# Patient Record
Sex: FEMALE | Race: BLACK OR AFRICAN AMERICAN | HISPANIC OR LATINO
[De-identification: names, ages, dates, MRNs, and addresses within clinical notes are randomized per-mention and may not be internally consistent; named-entity substitution may affect disease eponyms.]

---

## 2020-11-27 ENCOUNTER — APPOINTMENT (OUTPATIENT)
Dept: OPHTHALMOLOGY | Facility: CLINIC | Age: 85
End: 2020-11-27
Payer: MEDICARE

## 2020-11-27 ENCOUNTER — NON-APPOINTMENT (OUTPATIENT)
Age: 85
End: 2020-11-27

## 2020-11-27 PROCEDURE — 92133 CPTRZD OPH DX IMG PST SGM ON: CPT

## 2020-11-27 PROCEDURE — 92004 COMPRE OPH EXAM NEW PT 1/>: CPT

## 2020-11-28 PROBLEM — Z00.00 ENCOUNTER FOR PREVENTIVE HEALTH EXAMINATION: Status: ACTIVE | Noted: 2020-11-28

## 2021-01-08 ENCOUNTER — NON-APPOINTMENT (OUTPATIENT)
Age: 86
End: 2021-01-08

## 2021-01-08 ENCOUNTER — APPOINTMENT (OUTPATIENT)
Dept: OPHTHALMOLOGY | Facility: CLINIC | Age: 86
End: 2021-01-08
Payer: MEDICARE

## 2021-01-08 PROCEDURE — 92012 INTRM OPH EXAM EST PATIENT: CPT

## 2021-01-08 PROCEDURE — 99072 ADDL SUPL MATRL&STAF TM PHE: CPT

## 2021-02-04 ENCOUNTER — APPOINTMENT (OUTPATIENT)
Dept: OPHTHALMOLOGY | Facility: CLINIC | Age: 86
End: 2021-02-04
Payer: MEDICARE

## 2021-02-04 ENCOUNTER — NON-APPOINTMENT (OUTPATIENT)
Age: 86
End: 2021-02-04

## 2021-02-04 PROCEDURE — 66821 AFTER CATARACT LASER SURGERY: CPT | Mod: RT

## 2021-02-04 PROCEDURE — 99072 ADDL SUPL MATRL&STAF TM PHE: CPT

## 2021-02-10 ENCOUNTER — NON-APPOINTMENT (OUTPATIENT)
Age: 86
End: 2021-02-10

## 2021-02-10 ENCOUNTER — APPOINTMENT (OUTPATIENT)
Dept: OPHTHALMOLOGY | Facility: CLINIC | Age: 86
End: 2021-02-10
Payer: MEDICARE

## 2021-02-10 PROCEDURE — 99024 POSTOP FOLLOW-UP VISIT: CPT

## 2021-05-11 ENCOUNTER — NON-APPOINTMENT (OUTPATIENT)
Age: 86
End: 2021-05-11

## 2021-05-11 ENCOUNTER — APPOINTMENT (OUTPATIENT)
Dept: OPHTHALMOLOGY | Facility: CLINIC | Age: 86
End: 2021-05-11
Payer: MEDICARE

## 2021-05-11 PROCEDURE — 99072 ADDL SUPL MATRL&STAF TM PHE: CPT

## 2021-05-11 PROCEDURE — 92012 INTRM OPH EXAM EST PATIENT: CPT

## 2021-08-17 ENCOUNTER — NON-APPOINTMENT (OUTPATIENT)
Age: 86
End: 2021-08-17

## 2021-08-17 ENCOUNTER — APPOINTMENT (OUTPATIENT)
Dept: OPHTHALMOLOGY | Facility: CLINIC | Age: 86
End: 2021-08-17
Payer: MEDICARE

## 2021-08-17 PROCEDURE — 92012 INTRM OPH EXAM EST PATIENT: CPT

## 2021-08-17 PROCEDURE — 92133 CPTRZD OPH DX IMG PST SGM ON: CPT

## 2021-09-29 ENCOUNTER — NON-APPOINTMENT (OUTPATIENT)
Age: 86
End: 2021-09-29

## 2021-09-29 ENCOUNTER — APPOINTMENT (OUTPATIENT)
Dept: OPHTHALMOLOGY | Facility: CLINIC | Age: 86
End: 2021-09-29
Payer: MEDICARE

## 2021-09-29 PROCEDURE — 92012 INTRM OPH EXAM EST PATIENT: CPT

## 2022-01-25 ENCOUNTER — NON-APPOINTMENT (OUTPATIENT)
Age: 87
End: 2022-01-25

## 2022-01-25 ENCOUNTER — APPOINTMENT (OUTPATIENT)
Dept: OPHTHALMOLOGY | Facility: CLINIC | Age: 87
End: 2022-01-25
Payer: MEDICARE

## 2022-01-25 PROCEDURE — 92012 INTRM OPH EXAM EST PATIENT: CPT

## 2022-02-01 ENCOUNTER — APPOINTMENT (OUTPATIENT)
Dept: OPHTHALMOLOGY | Facility: CLINIC | Age: 87
End: 2022-02-01

## 2022-03-28 ENCOUNTER — EMERGENCY (EMERGENCY)
Facility: HOSPITAL | Age: 87
LOS: 1 days | Discharge: HOME | End: 2022-03-28
Attending: EMERGENCY MEDICINE
Payer: MEDICARE

## 2022-03-28 VITALS
HEIGHT: 63 IN | SYSTOLIC BLOOD PRESSURE: 183 MMHG | RESPIRATION RATE: 18 BRPM | TEMPERATURE: 98 F | WEIGHT: 149.91 LBS | OXYGEN SATURATION: 97 % | HEART RATE: 100 BPM | DIASTOLIC BLOOD PRESSURE: 100 MMHG

## 2022-03-28 VITALS
DIASTOLIC BLOOD PRESSURE: 93 MMHG | TEMPERATURE: 98 F | RESPIRATION RATE: 16 BRPM | HEART RATE: 81 BPM | OXYGEN SATURATION: 98 % | SYSTOLIC BLOOD PRESSURE: 174 MMHG

## 2022-03-28 LAB
ALBUMIN SERPL ELPH-MCNC: 4.1 G/DL — SIGNIFICANT CHANGE UP (ref 3.3–5)
ALP SERPL-CCNC: 89 U/L — SIGNIFICANT CHANGE UP (ref 40–120)
ALT FLD-CCNC: 9 U/L — LOW (ref 10–45)
ANION GAP SERPL CALC-SCNC: 14 MMOL/L — SIGNIFICANT CHANGE UP (ref 5–17)
AST SERPL-CCNC: 22 U/L — SIGNIFICANT CHANGE UP (ref 10–40)
BASOPHILS # BLD AUTO: 0.02 K/UL — SIGNIFICANT CHANGE UP (ref 0–0.2)
BASOPHILS NFR BLD AUTO: 0.4 % — SIGNIFICANT CHANGE UP (ref 0–2)
BILIRUB SERPL-MCNC: 0.3 MG/DL — SIGNIFICANT CHANGE UP (ref 0.2–1.2)
BUN SERPL-MCNC: 15 MG/DL — SIGNIFICANT CHANGE UP (ref 7–23)
CALCIUM SERPL-MCNC: 9.6 MG/DL — SIGNIFICANT CHANGE UP (ref 8.4–10.5)
CHLORIDE SERPL-SCNC: 105 MMOL/L — SIGNIFICANT CHANGE UP (ref 96–108)
CO2 SERPL-SCNC: 22 MMOL/L — SIGNIFICANT CHANGE UP (ref 22–31)
CREAT SERPL-MCNC: 0.75 MG/DL — SIGNIFICANT CHANGE UP (ref 0.5–1.3)
EGFR: 76 ML/MIN/1.73M2 — SIGNIFICANT CHANGE UP
EOSINOPHIL # BLD AUTO: 0.11 K/UL — SIGNIFICANT CHANGE UP (ref 0–0.5)
EOSINOPHIL NFR BLD AUTO: 2.3 % — SIGNIFICANT CHANGE UP (ref 0–6)
GLUCOSE SERPL-MCNC: 105 MG/DL — HIGH (ref 70–99)
HCT VFR BLD CALC: 35.3 % — SIGNIFICANT CHANGE UP (ref 34.5–45)
HGB BLD-MCNC: 11.5 G/DL — SIGNIFICANT CHANGE UP (ref 11.5–15.5)
IMM GRANULOCYTES NFR BLD AUTO: 0.2 % — SIGNIFICANT CHANGE UP (ref 0–1.5)
LYMPHOCYTES # BLD AUTO: 1.7 K/UL — SIGNIFICANT CHANGE UP (ref 1–3.3)
LYMPHOCYTES # BLD AUTO: 36.1 % — SIGNIFICANT CHANGE UP (ref 13–44)
MCHC RBC-ENTMCNC: 30 PG — SIGNIFICANT CHANGE UP (ref 27–34)
MCHC RBC-ENTMCNC: 32.6 GM/DL — SIGNIFICANT CHANGE UP (ref 32–36)
MCV RBC AUTO: 92.2 FL — SIGNIFICANT CHANGE UP (ref 80–100)
MONOCYTES # BLD AUTO: 0.61 K/UL — SIGNIFICANT CHANGE UP (ref 0–0.9)
MONOCYTES NFR BLD AUTO: 13 % — SIGNIFICANT CHANGE UP (ref 2–14)
NEUTROPHILS # BLD AUTO: 2.26 K/UL — SIGNIFICANT CHANGE UP (ref 1.8–7.4)
NEUTROPHILS NFR BLD AUTO: 48 % — SIGNIFICANT CHANGE UP (ref 43–77)
NRBC # BLD: 0 /100 WBCS — SIGNIFICANT CHANGE UP (ref 0–0)
PLATELET # BLD AUTO: 235 K/UL — SIGNIFICANT CHANGE UP (ref 150–400)
POTASSIUM SERPL-MCNC: 4 MMOL/L — SIGNIFICANT CHANGE UP (ref 3.5–5.3)
POTASSIUM SERPL-SCNC: 4 MMOL/L — SIGNIFICANT CHANGE UP (ref 3.5–5.3)
PROT SERPL-MCNC: 7.6 G/DL — SIGNIFICANT CHANGE UP (ref 6–8.3)
RBC # BLD: 3.83 M/UL — SIGNIFICANT CHANGE UP (ref 3.8–5.2)
RBC # FLD: 14.8 % — HIGH (ref 10.3–14.5)
SODIUM SERPL-SCNC: 141 MMOL/L — SIGNIFICANT CHANGE UP (ref 135–145)
TROPONIN T, HIGH SENSITIVITY RESULT: 9 NG/L — SIGNIFICANT CHANGE UP (ref 0–51)
WBC # BLD: 4.71 K/UL — SIGNIFICANT CHANGE UP (ref 3.8–10.5)
WBC # FLD AUTO: 4.71 K/UL — SIGNIFICANT CHANGE UP (ref 3.8–10.5)

## 2022-03-28 PROCEDURE — 84484 ASSAY OF TROPONIN QUANT: CPT

## 2022-03-28 PROCEDURE — 93005 ELECTROCARDIOGRAM TRACING: CPT

## 2022-03-28 PROCEDURE — 85025 COMPLETE CBC W/AUTO DIFF WBC: CPT

## 2022-03-28 PROCEDURE — 99284 EMERGENCY DEPT VISIT MOD MDM: CPT

## 2022-03-28 PROCEDURE — 80053 COMPREHEN METABOLIC PANEL: CPT

## 2022-03-28 PROCEDURE — 93010 ELECTROCARDIOGRAM REPORT: CPT

## 2022-03-28 PROCEDURE — 99285 EMERGENCY DEPT VISIT HI MDM: CPT

## 2022-03-28 NOTE — ED ADULT NURSE NOTE - NSIMPLEMENTINTERV_GEN_ALL_ED
Implemented All Universal Safety Interventions:  [place] to call system. Call bell, personal items and telephone within reach. Instruct patient to call for assistance. Room bathroom lighting operational. Non-slip footwear when patient is off stretcher. Physically safe environment: no spills, clutter or unnecessary equipment. Stretcher in lowest position, wheels locked, appropriate side rails in place.

## 2022-03-28 NOTE — ED ADULT TRIAGE NOTE - CHIEF COMPLAINT QUOTE
Pt states "generalized heat sensations" upon waking today  c/o not moving BM properly for 3 days  sent in from urgent care for abnormal EKG

## 2022-03-28 NOTE — ED PROVIDER NOTE - NS ED MD DISPO DISCHARGE
03/02/20        Mabel Perla III  [address]      Dear Suzan Best,    [facility] records indicate that you have outstanding lab work and or testing that was ordered for you and has not yet been completed:  Orders Placed This Encounter
Home

## 2022-03-28 NOTE — ED PROVIDER NOTE - PATIENT PORTAL LINK FT
You can access the FollowMyHealth Patient Portal offered by [facility] by registering at the following website: [email or web address]. By joining [employer]’s FollowMyHealth portal, you will also be able to view your health information using other applications (apps) compatible with our system.

## 2022-03-28 NOTE — ED PROVIDER NOTE - ATTENDING CONTRIBUTION TO CARE
20 min facial flushing / rush over the top half of body, better w bath. no cp or sob. notes she never had before - refered to er from urgent care. no pleuritic. no diaphoresis. no vomiting or pain. asmyptomatic here. happened in am many hrs ago.  pe above mine  ekg not specific for acs. ro acs. not cw neuro process. labs / ekg / f/u w personal medical doctor.

## 2022-03-28 NOTE — ED PROVIDER NOTE - PHYSICAL EXAMINATION
General: A&O x3 and in NAD  Heart: Normal S1/S2, normal rate, rhythm. No MRG  Lungs: CTA B/L  Abdomen: nontender throughout all quadrants, no rigidity, guarding distention.  PVS: no edema

## 2022-03-28 NOTE — ED ADULT NURSE REASSESSMENT NOTE - NS ED NURSE REASSESS COMMENT FT1
Received patient A&Ox3. vision impaired with no complaints at the moment states she wants to go home, will notify MD.

## 2022-03-28 NOTE — ED PROVIDER NOTE - NSFOLLOWUPINSTRUCTIONS_ED_ALL_ED_FT
IMPORTANT INSTRUCTIONS FROM Dr. ESTRADA:    Please follow up with your personal medical doctor in 24-48 hours.   Bring results from today to your visit.    If you were advised to take any medications - be sure to review the package insert.    If your symptoms change, get worse or if you have any new symptoms, come to the ER right away.  If you have any questions, call the ER at the phone number on this page.

## 2022-03-28 NOTE — ED ADULT NURSE NOTE - OBJECTIVE STATEMENT
Pt is an ambulatory 89 yr old female a/o X 3 c/o "feeling hot" upon waking this morning.  Pt denies chest pain, headache, N/V or SOB.  Pt c/o constipation but moved her bowels today and felt better after.  No signs of trauma.  Patient has no known covid exposure.  Abdomen NT ND.  No Urinary symptoms.  peripheral pulses +2bl no edema

## 2022-03-28 NOTE — ED PROVIDER NOTE - OBJECTIVE STATEMENT
89 y/o female with a PMHx of bilateral glaucoma presents to ED s/p urgent care visit to [facility]. Pt presented to urgent care complaining of constipation and flushing that resolved post bowel movement. Patient has no s/sx currently. Pt denies SOB, chest pain, N/V/D, dysuria, frequency, constipation, HA, dizziness.

## 2022-03-28 NOTE — ED PROVIDER NOTE - CLINICAL SUMMARY MEDICAL DECISION MAKING FREE TEXT BOX
Assessment: Healthy 89 y/o women with PMHx of bilateral glaucoma reports to ED s/p urgent care visit. Pt is NAD and currently experiencing no symptoms.     R/O ACS.    Plan: CBC, CMP, Trops, EKG

## 2022-04-26 ENCOUNTER — APPOINTMENT (OUTPATIENT)
Dept: OPHTHALMOLOGY | Facility: CLINIC | Age: 87
End: 2022-04-26

## 2022-06-08 ENCOUNTER — APPOINTMENT (OUTPATIENT)
Dept: OPHTHALMOLOGY | Facility: CLINIC | Age: 87
End: 2022-06-08
Payer: MEDICARE

## 2022-06-08 ENCOUNTER — NON-APPOINTMENT (OUTPATIENT)
Age: 87
End: 2022-06-08

## 2022-06-08 PROCEDURE — 92012 INTRM OPH EXAM EST PATIENT: CPT

## 2022-08-31 ENCOUNTER — NON-APPOINTMENT (OUTPATIENT)
Age: 87
End: 2022-08-31

## 2022-08-31 ENCOUNTER — APPOINTMENT (OUTPATIENT)
Dept: OPHTHALMOLOGY | Facility: CLINIC | Age: 87
End: 2022-08-31

## 2022-08-31 PROCEDURE — 92012 INTRM OPH EXAM EST PATIENT: CPT

## 2022-08-31 PROCEDURE — 92133 CPTRZD OPH DX IMG PST SGM ON: CPT

## 2022-10-02 ENCOUNTER — INPATIENT (INPATIENT)
Facility: HOSPITAL | Age: 87
LOS: 2 days | Discharge: HOME HEALTH | End: 2022-10-05
Attending: INTERNAL MEDICINE | Admitting: INTERNAL MEDICINE

## 2022-10-02 VITALS
OXYGEN SATURATION: 95 % | TEMPERATURE: 100 F | HEART RATE: 109 BPM | DIASTOLIC BLOOD PRESSURE: 111 MMHG | SYSTOLIC BLOOD PRESSURE: 179 MMHG | HEIGHT: 63 IN | RESPIRATION RATE: 19 BRPM | WEIGHT: 179.9 LBS

## 2022-10-02 LAB
ALBUMIN SERPL ELPH-MCNC: 3.6 G/DL — SIGNIFICANT CHANGE UP (ref 3.3–5)
ALP SERPL-CCNC: 83 U/L — SIGNIFICANT CHANGE UP (ref 40–120)
ALT FLD-CCNC: 22 U/L — SIGNIFICANT CHANGE UP (ref 12–78)
ANION GAP SERPL CALC-SCNC: 8 MMOL/L — SIGNIFICANT CHANGE UP (ref 5–17)
APTT BLD: 28.3 SEC — SIGNIFICANT CHANGE UP (ref 27.5–35.5)
AST SERPL-CCNC: 29 U/L — SIGNIFICANT CHANGE UP (ref 15–37)
BASOPHILS # BLD AUTO: 0.03 K/UL — SIGNIFICANT CHANGE UP (ref 0–0.2)
BASOPHILS NFR BLD AUTO: 0.6 % — SIGNIFICANT CHANGE UP (ref 0–2)
BILIRUB SERPL-MCNC: 0.5 MG/DL — SIGNIFICANT CHANGE UP (ref 0.2–1.2)
BUN SERPL-MCNC: 12 MG/DL — SIGNIFICANT CHANGE UP (ref 7–23)
CALCIUM SERPL-MCNC: 9.2 MG/DL — SIGNIFICANT CHANGE UP (ref 8.5–10.1)
CHLORIDE SERPL-SCNC: 101 MMOL/L — SIGNIFICANT CHANGE UP (ref 96–108)
CO2 SERPL-SCNC: 26 MMOL/L — SIGNIFICANT CHANGE UP (ref 22–31)
CREAT SERPL-MCNC: 0.97 MG/DL — SIGNIFICANT CHANGE UP (ref 0.5–1.3)
EGFR: 56 ML/MIN/1.73M2 — LOW
EOSINOPHIL # BLD AUTO: 0.01 K/UL — SIGNIFICANT CHANGE UP (ref 0–0.5)
EOSINOPHIL NFR BLD AUTO: 0.2 % — SIGNIFICANT CHANGE UP (ref 0–6)
FLUAV AG NPH QL: SIGNIFICANT CHANGE UP
FLUBV AG NPH QL: SIGNIFICANT CHANGE UP
GLUCOSE BLDC GLUCOMTR-MCNC: 131 MG/DL — HIGH (ref 70–99)
GLUCOSE SERPL-MCNC: 143 MG/DL — HIGH (ref 70–99)
HCT VFR BLD CALC: 36.6 % — SIGNIFICANT CHANGE UP (ref 34.5–45)
HGB BLD-MCNC: 12.6 G/DL — SIGNIFICANT CHANGE UP (ref 11.5–15.5)
IMM GRANULOCYTES NFR BLD AUTO: 0.2 % — SIGNIFICANT CHANGE UP (ref 0–0.9)
INR BLD: 1.07 RATIO — SIGNIFICANT CHANGE UP (ref 0.88–1.16)
LYMPHOCYTES # BLD AUTO: 1.59 K/UL — SIGNIFICANT CHANGE UP (ref 1–3.3)
LYMPHOCYTES # BLD AUTO: 31.2 % — SIGNIFICANT CHANGE UP (ref 13–44)
MAGNESIUM SERPL-MCNC: 2 MG/DL — SIGNIFICANT CHANGE UP (ref 1.6–2.6)
MCHC RBC-ENTMCNC: 31.6 PG — SIGNIFICANT CHANGE UP (ref 27–34)
MCHC RBC-ENTMCNC: 34.4 G/DL — SIGNIFICANT CHANGE UP (ref 32–36)
MCV RBC AUTO: 91.7 FL — SIGNIFICANT CHANGE UP (ref 80–100)
MONOCYTES # BLD AUTO: 0.48 K/UL — SIGNIFICANT CHANGE UP (ref 0–0.9)
MONOCYTES NFR BLD AUTO: 9.4 % — SIGNIFICANT CHANGE UP (ref 2–14)
NEUTROPHILS # BLD AUTO: 2.97 K/UL — SIGNIFICANT CHANGE UP (ref 1.8–7.4)
NEUTROPHILS NFR BLD AUTO: 58.4 % — SIGNIFICANT CHANGE UP (ref 43–77)
NRBC # BLD: 0 /100 WBCS — SIGNIFICANT CHANGE UP (ref 0–0)
PLATELET # BLD AUTO: 231 K/UL — SIGNIFICANT CHANGE UP (ref 150–400)
POTASSIUM SERPL-MCNC: 3.7 MMOL/L — SIGNIFICANT CHANGE UP (ref 3.5–5.3)
POTASSIUM SERPL-SCNC: 3.7 MMOL/L — SIGNIFICANT CHANGE UP (ref 3.5–5.3)
PROT SERPL-MCNC: 8.4 GM/DL — HIGH (ref 6–8.3)
PROTHROM AB SERPL-ACNC: 12.7 SEC — SIGNIFICANT CHANGE UP (ref 10.5–13.4)
RBC # BLD: 3.99 M/UL — SIGNIFICANT CHANGE UP (ref 3.8–5.2)
RBC # FLD: 14 % — SIGNIFICANT CHANGE UP (ref 10.3–14.5)
SARS-COV-2 RNA SPEC QL NAA+PROBE: SIGNIFICANT CHANGE UP
SODIUM SERPL-SCNC: 135 MMOL/L — SIGNIFICANT CHANGE UP (ref 135–145)
TROPONIN I, HIGH SENSITIVITY RESULT: 8.6 NG/L — SIGNIFICANT CHANGE UP
WBC # BLD: 5.09 K/UL — SIGNIFICANT CHANGE UP (ref 3.8–10.5)
WBC # FLD AUTO: 5.09 K/UL — SIGNIFICANT CHANGE UP (ref 3.8–10.5)

## 2022-10-02 PROCEDURE — 71045 X-RAY EXAM CHEST 1 VIEW: CPT | Mod: 26

## 2022-10-02 PROCEDURE — 99285 EMERGENCY DEPT VISIT HI MDM: CPT

## 2022-10-02 PROCEDURE — 99223 1ST HOSP IP/OBS HIGH 75: CPT

## 2022-10-02 PROCEDURE — 70450 CT HEAD/BRAIN W/O DYE: CPT | Mod: 26,MA

## 2022-10-02 PROCEDURE — 93010 ELECTROCARDIOGRAM REPORT: CPT

## 2022-10-02 RX ORDER — ASPIRIN/CALCIUM CARB/MAGNESIUM 324 MG
325 TABLET ORAL ONCE
Refills: 0 | Status: COMPLETED | OUTPATIENT
Start: 2022-10-02 | End: 2022-10-02

## 2022-10-02 RX ORDER — ATORVASTATIN CALCIUM 80 MG/1
80 TABLET, FILM COATED ORAL ONCE
Refills: 0 | Status: COMPLETED | OUTPATIENT
Start: 2022-10-02 | End: 2022-10-02

## 2022-10-02 RX ORDER — ENOXAPARIN SODIUM 100 MG/ML
40 INJECTION SUBCUTANEOUS EVERY 24 HOURS
Refills: 0 | Status: DISCONTINUED | OUTPATIENT
Start: 2022-10-02 | End: 2022-10-05

## 2022-10-02 RX ORDER — ATORVASTATIN CALCIUM 80 MG/1
40 TABLET, FILM COATED ORAL AT BEDTIME
Refills: 0 | Status: DISCONTINUED | OUTPATIENT
Start: 2022-10-03 | End: 2022-10-05

## 2022-10-02 RX ORDER — DORZOLAMIDE HYDROCHLORIDE TIMOLOL MALEATE 20; 5 MG/ML; MG/ML
1 SOLUTION/ DROPS OPHTHALMIC
Refills: 0 | Status: DISCONTINUED | OUTPATIENT
Start: 2022-10-02 | End: 2022-10-05

## 2022-10-02 RX ADMIN — ATORVASTATIN CALCIUM 80 MILLIGRAM(S): 80 TABLET, FILM COATED ORAL at 14:58

## 2022-10-02 RX ADMIN — Medication 325 MILLIGRAM(S): at 14:58

## 2022-10-02 NOTE — H&P ADULT - NSHPPHYSICALEXAM_GEN_ALL_CORE
Vital Signs Last 24 Hrs  T(C): 37.7 (02 Oct 2022 13:58), Max: 37.7 (02 Oct 2022 13:58)  T(F): 99.8 (02 Oct 2022 13:58), Max: 99.8 (02 Oct 2022 13:58)  HR: 109 (02 Oct 2022 13:58) (109 - 109)  BP: 179/111 (02 Oct 2022 13:58) (179/111 - 179/111)  RR: 19 (02 Oct 2022 13:58) (19 - 19)  SpO2: 95% (02 Oct 2022 13:58) (95% - 95%)    Parameters below as of 02 Oct 2022 13:58  Patient On (Oxygen Delivery Method): room air    CONSTITUTIONAL: Well appearing elderly female, well nourished, awake, alert and in no apparent distress  ENMT: Airway patent, Nasal mucosa clear. Mouth with normal mucosa.   EYES: Clear bilaterally, pupils equal, round and reactive to light.   CARDIAC: Normal rate, regular rhythm.  Heart sounds S1, S2.  No murmurs, rubs or gallops   RESPIRATORY: Breath sounds clear and equal bilaterally. 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  SKIN: No rash, skin turgor

## 2022-10-02 NOTE — H&P ADULT - ASSESSMENT
Patient is a 89 y/o female from home, aoox3 at baseline, ambulates independently lives alone w/ no HHA w/ PMH of glaucoma w/ some visual impairment, recent HTN(not on meds) and PSH of hand surgery p/w confusion and dysarthria episode this am which has now resolved. Admitted for TIA evaluation     A/P:  #Transient  ischemic Attack   #Hypertension   #DVT ppx     Plan:   -Pt  Patient is a 89 y/o female from home, aoox3 at baseline, ambulates independently lives alone w/ no HHA w/ PMH of glaucoma w/ some visual impairment, recent HTN(not on meds) and PSH of hand surgery p/w confusion and dysarthria episode this am which has now resolved. Admitted for TIA evaluation     A/P:  #Transient  ischemic Attack   #Hypertension   #DVT ppx     Plan:   -Pt w/ brief confusion and dysarthria episode which resolved spontaneously, no focal deficits at this time.   -NIH Score scale 0   -CT head unremarkable.  -Start asa, statin, C/w tele, ECHO, HbA1C, Lipid profile   -Neurology eval in am   -Monitor BP, if persistently elevated would start low dose anti-HTN agent    -Lovenox SC

## 2022-10-02 NOTE — ED ADULT TRIAGE NOTE - ARRIVAL INFO ADDITIONAL COMMENTS
Dr Wells evaluated patient in triage no code stroke as per md , 2 for pain Dr Wells evaluated patient in triage no code stroke as per md ,

## 2022-10-02 NOTE — H&P ADULT - HISTORY OF PRESENT ILLNESS
Patient is a 89 y/o female from home, aoox3 at baseline, ambulates independently lives alone w/ no HHA w/ PMH of glaucoma w/ some visual impairment, recent HTN(not on meds) and PSH of hand surgery p/w confusion and dysarthria x1 day. Per patient's daughter, pt called her this morning, saying that she needs to go the hospital but had difficulty in articulating her thoughts . Pt's speech was not clear and she had "word finding difficulty". Later, when daughter arrived, pt seemed confused and said that her house was bright though it was all dark. This episode lasted for 30 minutes to an hour and is now at baseline. Pt reportedly had a similar confusion episode back in March and was found to be hypertensive at that [facility].  Pt also c/o headaches which she attributes to not eating since morning. She denies fever, chills, cough, nausea, vomiting, diarrhea, constipation, ongoing weakness or other complaints.    In ED, patient's VS showed /111,  , Temp 98.8F,  RR 19, SPO2  95% on RA

## 2022-10-02 NOTE — ED PROVIDER NOTE - OBJECTIVE STATEMENT
89F hx glaucoma and untreated htn pw brief episode of aphasia. before arrival to the er, daughter was talking to pt on the phone and pt was saying she didn't feel well. she was unable to articulate how. daughter denies hearing slurring but says pt sounded listless and couldn't find words. now pt back to baseline. denies ha, vision loss, rhinorrhea, cp, sob, abd pn, vomiting ,fever, chills, rash, bleeding, dysuria, vaginal bleeding

## 2022-10-02 NOTE — ED PROVIDER NOTE - CLINICAL SUMMARY MEDICAL DECISION MAKING FREE TEXT BOX
tia likely nih 0. aspirin, statin, admit. tia likely nih 0. aspirin, statin, admit.  I read ekg as nsr rate 94, no st elevation or depression, qtc 440, narrow qrs, normal axis.

## 2022-10-02 NOTE — ED ADULT NURSE NOTE - OBJECTIVE STATEMENT
Pt jeremie, a per daughter mother not acting her usual self today, mother became confused, and forgetful.

## 2022-10-02 NOTE — ED ADULT NURSE NOTE - ED STAT RN HANDOFF DETAILS
Received pt in the ED,aox2 to person and place. C/o ams. Pt denied cp,sob, n/v/d. Ambulatory with assistance. Admitted, Pending tele bed assignment. Received pt in the ED,aox2 to person and place. C/o ams. Pt denied cp,sob, n/v/d. Ambulatory with assistance. Admitted for TIA, Pending tele bed assignment.

## 2022-10-02 NOTE — H&P ADULT - NSHPSOCIALHISTORY_GEN_ALL_CORE
Pt is from home, lives independently. She denies smoking, ETOH or recreational drug use. 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

## 2022-10-02 NOTE — ED ADULT TRIAGE NOTE - CHIEF COMPLAINT QUOTE
patient BIBA c/o of headache as per daughter Irais patient had an episode of " hallucination " while talking on the phone with her, patient A&Ox4 moving all extremities no facial droop clear speech a the time of triage

## 2022-10-03 DIAGNOSIS — E87.6 HYPOKALEMIA: ICD-10-CM

## 2022-10-03 DIAGNOSIS — R41.82 ALTERED MENTAL STATUS, UNSPECIFIED: ICD-10-CM

## 2022-10-03 DIAGNOSIS — H40.9 UNSPECIFIED GLAUCOMA: ICD-10-CM

## 2022-10-03 DIAGNOSIS — R03.0 ELEVATED BLOOD-PRESSURE READING, WITHOUT DIAGNOSIS OF HYPERTENSION: ICD-10-CM

## 2022-10-03 LAB
A1C WITH ESTIMATED AVERAGE GLUCOSE RESULT: 5.8 % — HIGH (ref 4–5.6)
ALBUMIN SERPL ELPH-MCNC: 3.4 G/DL — SIGNIFICANT CHANGE UP (ref 3.3–5)
ALP SERPL-CCNC: 78 U/L — SIGNIFICANT CHANGE UP (ref 40–120)
ALT FLD-CCNC: 18 U/L — SIGNIFICANT CHANGE UP (ref 12–78)
ANION GAP SERPL CALC-SCNC: 7 MMOL/L — SIGNIFICANT CHANGE UP (ref 5–17)
APPEARANCE UR: CLEAR — SIGNIFICANT CHANGE UP
AST SERPL-CCNC: 19 U/L — SIGNIFICANT CHANGE UP (ref 15–37)
BACTERIA # UR AUTO: ABNORMAL
BASOPHILS # BLD AUTO: 0.03 K/UL — SIGNIFICANT CHANGE UP (ref 0–0.2)
BASOPHILS NFR BLD AUTO: 0.7 % — SIGNIFICANT CHANGE UP (ref 0–2)
BILIRUB SERPL-MCNC: 0.5 MG/DL — SIGNIFICANT CHANGE UP (ref 0.2–1.2)
BILIRUB UR-MCNC: NEGATIVE — SIGNIFICANT CHANGE UP
BUN SERPL-MCNC: 13 MG/DL — SIGNIFICANT CHANGE UP (ref 7–23)
CALCIUM SERPL-MCNC: 8.9 MG/DL — SIGNIFICANT CHANGE UP (ref 8.5–10.1)
CHLORIDE SERPL-SCNC: 108 MMOL/L — SIGNIFICANT CHANGE UP (ref 96–108)
CHOLEST SERPL-MCNC: 203 MG/DL — HIGH
CO2 SERPL-SCNC: 26 MMOL/L — SIGNIFICANT CHANGE UP (ref 22–31)
COLOR SPEC: YELLOW — SIGNIFICANT CHANGE UP
CREAT SERPL-MCNC: 0.81 MG/DL — SIGNIFICANT CHANGE UP (ref 0.5–1.3)
DIFF PNL FLD: NEGATIVE — SIGNIFICANT CHANGE UP
EGFR: 69 ML/MIN/1.73M2 — SIGNIFICANT CHANGE UP
EOSINOPHIL # BLD AUTO: 0.06 K/UL — SIGNIFICANT CHANGE UP (ref 0–0.5)
EOSINOPHIL NFR BLD AUTO: 1.5 % — SIGNIFICANT CHANGE UP (ref 0–6)
EPI CELLS # UR: ABNORMAL
ESTIMATED AVERAGE GLUCOSE: 120 MG/DL — HIGH (ref 68–114)
GLUCOSE SERPL-MCNC: 111 MG/DL — HIGH (ref 70–99)
GLUCOSE UR QL: NEGATIVE MG/DL — SIGNIFICANT CHANGE UP
HCT VFR BLD CALC: 34.2 % — LOW (ref 34.5–45)
HDLC SERPL-MCNC: 76 MG/DL — SIGNIFICANT CHANGE UP
HGB BLD-MCNC: 11.5 G/DL — SIGNIFICANT CHANGE UP (ref 11.5–15.5)
IMM GRANULOCYTES NFR BLD AUTO: 0.2 % — SIGNIFICANT CHANGE UP (ref 0–0.9)
KETONES UR-MCNC: NEGATIVE — SIGNIFICANT CHANGE UP
LEUKOCYTE ESTERASE UR-ACNC: ABNORMAL
LIPID PNL WITH DIRECT LDL SERPL: 118 MG/DL — HIGH
LYMPHOCYTES # BLD AUTO: 1.82 K/UL — SIGNIFICANT CHANGE UP (ref 1–3.3)
LYMPHOCYTES # BLD AUTO: 44.1 % — HIGH (ref 13–44)
MAGNESIUM SERPL-MCNC: 1.9 MG/DL — SIGNIFICANT CHANGE UP (ref 1.6–2.6)
MCHC RBC-ENTMCNC: 30.7 PG — SIGNIFICANT CHANGE UP (ref 27–34)
MCHC RBC-ENTMCNC: 33.6 G/DL — SIGNIFICANT CHANGE UP (ref 32–36)
MCV RBC AUTO: 91.4 FL — SIGNIFICANT CHANGE UP (ref 80–100)
MONOCYTES # BLD AUTO: 0.63 K/UL — SIGNIFICANT CHANGE UP (ref 0–0.9)
MONOCYTES NFR BLD AUTO: 15.3 % — HIGH (ref 2–14)
NEUTROPHILS # BLD AUTO: 1.58 K/UL — LOW (ref 1.8–7.4)
NEUTROPHILS NFR BLD AUTO: 38.2 % — LOW (ref 43–77)
NITRITE UR-MCNC: NEGATIVE — SIGNIFICANT CHANGE UP
NON HDL CHOLESTEROL: 127 MG/DL — SIGNIFICANT CHANGE UP
NRBC # BLD: 0 /100 WBCS — SIGNIFICANT CHANGE UP (ref 0–0)
PH UR: 6.5 — SIGNIFICANT CHANGE UP (ref 5–8)
PHOSPHATE SERPL-MCNC: 2.6 MG/DL — SIGNIFICANT CHANGE UP (ref 2.5–4.5)
PLATELET # BLD AUTO: 219 K/UL — SIGNIFICANT CHANGE UP (ref 150–400)
POTASSIUM SERPL-MCNC: 3.1 MMOL/L — LOW (ref 3.5–5.3)
POTASSIUM SERPL-SCNC: 3.1 MMOL/L — LOW (ref 3.5–5.3)
PROT SERPL-MCNC: 7.3 GM/DL — SIGNIFICANT CHANGE UP (ref 6–8.3)
PROT UR-MCNC: NEGATIVE MG/DL — SIGNIFICANT CHANGE UP
RBC # BLD: 3.74 M/UL — LOW (ref 3.8–5.2)
RBC # FLD: 14.1 % — SIGNIFICANT CHANGE UP (ref 10.3–14.5)
RBC CASTS # UR COMP ASSIST: SIGNIFICANT CHANGE UP /HPF (ref 0–4)
SODIUM SERPL-SCNC: 141 MMOL/L — SIGNIFICANT CHANGE UP (ref 135–145)
SP GR SPEC: 1.01 — SIGNIFICANT CHANGE UP (ref 1.01–1.02)
T PALLIDUM AB TITR SER: NEGATIVE — SIGNIFICANT CHANGE UP
TRIGL SERPL-MCNC: 45 MG/DL — SIGNIFICANT CHANGE UP
TSH SERPL-MCNC: 6.92 UIU/ML — HIGH (ref 0.36–3.74)
UROBILINOGEN FLD QL: NEGATIVE MG/DL — SIGNIFICANT CHANGE UP
WBC # BLD: 4.13 K/UL — SIGNIFICANT CHANGE UP (ref 3.8–10.5)
WBC # FLD AUTO: 4.13 K/UL — SIGNIFICANT CHANGE UP (ref 3.8–10.5)
WBC UR QL: ABNORMAL

## 2022-10-03 PROCEDURE — 99233 SBSQ HOSP IP/OBS HIGH 50: CPT

## 2022-10-03 PROCEDURE — 99222 1ST HOSP IP/OBS MODERATE 55: CPT

## 2022-10-03 PROCEDURE — 70551 MRI BRAIN STEM W/O DYE: CPT | Mod: 26

## 2022-10-03 RX ORDER — ASPIRIN/CALCIUM CARB/MAGNESIUM 324 MG
81 TABLET ORAL DAILY
Refills: 0 | Status: DISCONTINUED | OUTPATIENT
Start: 2022-10-03 | End: 2022-10-05

## 2022-10-03 RX ORDER — ACETAMINOPHEN 500 MG
650 TABLET ORAL EVERY 6 HOURS
Refills: 0 | Status: DISCONTINUED | OUTPATIENT
Start: 2022-10-03 | End: 2022-10-05

## 2022-10-03 RX ORDER — POTASSIUM CHLORIDE 20 MEQ
20 PACKET (EA) ORAL
Refills: 0 | Status: COMPLETED | OUTPATIENT
Start: 2022-10-03 | End: 2022-10-03

## 2022-10-03 RX ADMIN — Medication 20 MILLIEQUIVALENT(S): at 17:38

## 2022-10-03 RX ADMIN — Medication 20 MILLIEQUIVALENT(S): at 11:08

## 2022-10-03 RX ADMIN — Medication 81 MILLIGRAM(S): at 13:57

## 2022-10-03 RX ADMIN — ENOXAPARIN SODIUM 40 MILLIGRAM(S): 100 INJECTION SUBCUTANEOUS at 05:26

## 2022-10-03 RX ADMIN — DORZOLAMIDE HYDROCHLORIDE TIMOLOL MALEATE 1 DROP(S): 20; 5 SOLUTION/ DROPS OPHTHALMIC at 17:38

## 2022-10-03 RX ADMIN — Medication 20 MILLIEQUIVALENT(S): at 13:57

## 2022-10-03 RX ADMIN — ATORVASTATIN CALCIUM 40 MILLIGRAM(S): 80 TABLET, FILM COATED ORAL at 21:31

## 2022-10-03 NOTE — CONSULT NOTE ADULT - SUBJECTIVE AND OBJECTIVE BOX
Patient is a 89y old  Female who presents with a chief complaint of TIA (03 Oct 2022 12:53)      CC: confusion    HPI:  89 yo woman sent to ER by family due to word finding difficulty, slurred speech, confusion.  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  This was transient, now back to baseline, and speech is back to normal.  She had a similar event like this in the past, and work up was negative.  No history of stroke or seizures.  Doing well today, no complaints.  Daughter reports recent short term memory problems    Head CT - negative    EKG: NSR    LDL = 118    PAST MEDICAL & SURGICAL HISTORY:  Benign essential HTN  Glaucoma    FAMILY HISTORY:  No pertinent family history in first degree relatives      Social Hx:  Nonsmoker, no drug or alcohol use    MEDICATIONS  (STANDING):  aspirin enteric coated 81 milliGRAM(s) Oral daily  atorvastatin 40 milliGRAM(s) Oral at bedtime  dorzolamide 2%/timolol 0.5% Ophthalmic Solution 1 Drop(s) Both EYES two times a day  enoxaparin Injectable 40 milliGRAM(s) SubCutaneous every 24 hours       Allergies  No Known Allergies    ROS: Pertinent positives in HPI, all other ROS were reviewed and are negative.      Vital Signs Last 24 Hrs  T(C): 36.7 (03 Oct 2022 15:07), Max: 37.2 (03 Oct 2022 05:28)  T(F): 98.1 (03 Oct 2022 15:07), Max: 98.9 (03 Oct 2022 05:28)  HR: 87 (03 Oct 2022 15:07) (73 - 87)  BP: 151/83 (03 Oct 2022 15:07) (128/80 - 165/93)  BP(mean): --  RR: 18 (03 Oct 2022 15:07) (16 - 18)  SpO2: 100% (03 Oct 2022 15:07) (97% - 100%)    Parameters below as of 03 Oct 2022 15:07  Patient On (Oxygen Delivery Method): room air        Neurological exam:  HF: A x O x 3. Appropriately interactive, normal affect. Speech fluent, No Aphasia or paraphasic errors. Naming /repetition intact   CN: Blind (no light perception), facial sensation normal, no NLFD, tongue midline, Palate moves equally, SCM equal bilaterally  Motor: No pronator drift, Strength 5/5 in all 4 ext, normal bulk and tone, no tremor, rigidity or bradykinesia.    Sens: Intact to light touch / PP/ VS/ JS    Reflexes: Symmetric and normal . BJ 2+, BR 2+, KJ 2+, AJ 2+, downgoing toes b/l  Coord:  No FNFA, dysmetria, IVANA intact     NIHSS = 0    Labs:   10-03    141  |  108  |  13  ----------------------------<  111<H>  3.1<L>   |  26  |  0.81    Ca    8.9      03 Oct 2022 08:20  Phos  2.6     10-03  Mg     1.9     10-03    TPro  7.3  /  Alb  3.4  /  TBili  0.5  /  DBili  x   /  AST  19  /  ALT  18  /  AlkPhos  78  10-03    10-03 Chol 203<H> LDL -- HDL 76 Trig 45                          11.5   4.13  )-----------( 219      ( 03 Oct 2022 08:20 )             34.2       A/P:   89 yo with transient neurological symptoms, including visual perceptions, now resolved.  Possible TIA vs stroke.    Recommend:  1. MRI brain  2. Carotid US  3. TTE  4. Telemetry monitoring  5. EEG  6. Aspirin 81mg daily  7. Intensive statin therapy, target LDL < 70  8. Optimize control of BP  9. Memory impairment: check B12, folate, MMA, homocysteine, RPR, TSH  10. Stroke education    Rene Mayen MD  Neurology Attending Physician

## 2022-10-03 NOTE — PHYSICAL THERAPY INITIAL EVALUATION ADULT - ADDITIONAL COMMENTS
As per patient and patient's daughter Suri at bedside: Patient lives in a house with no steps to enter, 1 flight with B rails to bedroom. Patient does not have a home health aide, independent in her environment without a device. Patient's daughter reports patient has a cane in good working condition, does not use it.

## 2022-10-03 NOTE — PROGRESS NOTE ADULT - PROBLEM SELECTOR PLAN 1
present with confusion and ? hallucination  still not quite herself yet  CT head with no acute pathology  MRI brain to R/O CVA, carotid doppler  add aspirin, continue high intensity statin  Neurology consulted- Dr Mayen present with confusion and ? hallucination  still not quite herself yet  CT head with no acute pathology  MRI brain to R/O CVA, carotid doppler  add aspirin, continue high intensity statin  Neurology consulted- Dr Mayen  B12, folate, RPR

## 2022-10-03 NOTE — PATIENT PROFILE ADULT - FALL HARM RISK - HARM RISK INTERVENTIONS
Assistance with ambulation/Assistance OOB with selected safe patient handling equipment/Communicate Risk of Fall with Harm to all staff/Monitor for mental status changes/Move patient closer to nurses' station/Reinforce activity limits and safety measures with patient and family/Reorient to person, place and time as needed/Tailored Fall Risk Interventions/Toileting schedule using arm’s reach rule for commode and bathroom/Use of alarms - bed, chair and/or voice tab/Visual Cue: Yellow wristband and red socks/Bed in lowest position, wheels locked, appropriate side rails in place/Call bell, personal items and telephone in reach/Instruct patient to call for assistance before getting out of bed or chair/Non-slip footwear when patient is out of bed/[place] to call system/Physically safe environment - no spills, clutter or unnecessary equipment/Purposeful Proactive Rounding/Room/bathroom lighting operational, light cord in reach

## 2022-10-03 NOTE — PHYSICAL THERAPY INITIAL EVALUATION ADULT - GAIT TRAINING, PT EVAL
Patient will ambulate 500 feet without device independently for community ambulation in 5-7 days. Patient will ascend/descend 12 steps with R rail up/L rail down independently in 5-7 days to safely navigate home environment.

## 2022-10-04 LAB
ALBUMIN SERPL ELPH-MCNC: 3 G/DL — LOW (ref 3.3–5)
ALP SERPL-CCNC: 69 U/L — SIGNIFICANT CHANGE UP (ref 40–120)
ALT FLD-CCNC: 18 U/L — SIGNIFICANT CHANGE UP (ref 12–78)
ANION GAP SERPL CALC-SCNC: 7 MMOL/L — SIGNIFICANT CHANGE UP (ref 5–17)
AST SERPL-CCNC: 16 U/L — SIGNIFICANT CHANGE UP (ref 15–37)
BILIRUB SERPL-MCNC: 0.3 MG/DL — SIGNIFICANT CHANGE UP (ref 0.2–1.2)
BUN SERPL-MCNC: 17 MG/DL — SIGNIFICANT CHANGE UP (ref 7–23)
CALCIUM SERPL-MCNC: 8.9 MG/DL — SIGNIFICANT CHANGE UP (ref 8.5–10.1)
CHLORIDE SERPL-SCNC: 107 MMOL/L — SIGNIFICANT CHANGE UP (ref 96–108)
CO2 SERPL-SCNC: 24 MMOL/L — SIGNIFICANT CHANGE UP (ref 22–31)
CREAT SERPL-MCNC: 0.84 MG/DL — SIGNIFICANT CHANGE UP (ref 0.5–1.3)
EGFR: 66 ML/MIN/1.73M2 — SIGNIFICANT CHANGE UP
FOLATE SERPL-MCNC: 12.9 NG/ML — SIGNIFICANT CHANGE UP
GLUCOSE SERPL-MCNC: 103 MG/DL — HIGH (ref 70–99)
HCT VFR BLD CALC: 31.8 % — LOW (ref 34.5–45)
HCYS SERPL-MCNC: 12.7 UMOL/L — SIGNIFICANT CHANGE UP
HGB BLD-MCNC: 10.8 G/DL — LOW (ref 11.5–15.5)
MAGNESIUM SERPL-MCNC: 2 MG/DL — SIGNIFICANT CHANGE UP (ref 1.6–2.6)
MCHC RBC-ENTMCNC: 31.1 PG — SIGNIFICANT CHANGE UP (ref 27–34)
MCHC RBC-ENTMCNC: 34 G/DL — SIGNIFICANT CHANGE UP (ref 32–36)
MCV RBC AUTO: 91.6 FL — SIGNIFICANT CHANGE UP (ref 80–100)
NRBC # BLD: 0 /100 WBCS — SIGNIFICANT CHANGE UP (ref 0–0)
PHOSPHATE SERPL-MCNC: 2.7 MG/DL — SIGNIFICANT CHANGE UP (ref 2.5–4.5)
PLATELET # BLD AUTO: 198 K/UL — SIGNIFICANT CHANGE UP (ref 150–400)
POTASSIUM SERPL-MCNC: 3.5 MMOL/L — SIGNIFICANT CHANGE UP (ref 3.5–5.3)
POTASSIUM SERPL-SCNC: 3.5 MMOL/L — SIGNIFICANT CHANGE UP (ref 3.5–5.3)
PROT SERPL-MCNC: 7 GM/DL — SIGNIFICANT CHANGE UP (ref 6–8.3)
RBC # BLD: 3.47 M/UL — LOW (ref 3.8–5.2)
RBC # FLD: 14.1 % — SIGNIFICANT CHANGE UP (ref 10.3–14.5)
SODIUM SERPL-SCNC: 138 MMOL/L — SIGNIFICANT CHANGE UP (ref 135–145)
T4 FREE SERPL-MCNC: 1.2 NG/DL — SIGNIFICANT CHANGE UP (ref 0.9–1.8)
TSH SERPL-MCNC: 4.98 UIU/ML — HIGH (ref 0.36–3.74)
VIT B12 SERPL-MCNC: 480 PG/ML — SIGNIFICANT CHANGE UP (ref 232–1245)
WBC # BLD: 4.43 K/UL — SIGNIFICANT CHANGE UP (ref 3.8–10.5)
WBC # FLD AUTO: 4.43 K/UL — SIGNIFICANT CHANGE UP (ref 3.8–10.5)

## 2022-10-04 PROCEDURE — 95816 EEG AWAKE AND DROWSY: CPT | Mod: 26

## 2022-10-04 PROCEDURE — 93306 TTE W/DOPPLER COMPLETE: CPT | Mod: 26

## 2022-10-04 PROCEDURE — 99232 SBSQ HOSP IP/OBS MODERATE 35: CPT

## 2022-10-04 RX ORDER — LANOLIN ALCOHOL/MO/W.PET/CERES
3 CREAM (GRAM) TOPICAL AT BEDTIME
Refills: 0 | Status: COMPLETED | OUTPATIENT
Start: 2022-10-04 | End: 2022-10-04

## 2022-10-04 RX ORDER — PREGABALIN 225 MG/1
1000 CAPSULE ORAL DAILY
Refills: 0 | Status: DISCONTINUED | OUTPATIENT
Start: 2022-10-04 | End: 2022-10-05

## 2022-10-04 RX ORDER — AMLODIPINE BESYLATE 2.5 MG/1
2.5 TABLET ORAL DAILY
Refills: 0 | Status: DISCONTINUED | OUTPATIENT
Start: 2022-10-04 | End: 2022-10-05

## 2022-10-04 RX ORDER — LATANOPROST 0.05 MG/ML
1 SOLUTION/ DROPS OPHTHALMIC; TOPICAL AT BEDTIME
Refills: 0 | Status: DISCONTINUED | OUTPATIENT
Start: 2022-10-04 | End: 2022-10-05

## 2022-10-04 RX ORDER — ASPIRIN/CALCIUM CARB/MAGNESIUM 324 MG
1 TABLET ORAL
Qty: 0 | Refills: 0 | DISCHARGE

## 2022-10-04 RX ORDER — LEVETIRACETAM 250 MG/1
500 TABLET, FILM COATED ORAL ONCE
Refills: 0 | Status: COMPLETED | OUTPATIENT
Start: 2022-10-04 | End: 2022-10-04

## 2022-10-04 RX ORDER — LEVETIRACETAM 250 MG/1
250 TABLET, FILM COATED ORAL
Refills: 0 | Status: DISCONTINUED | OUTPATIENT
Start: 2022-10-04 | End: 2022-10-05

## 2022-10-04 RX ADMIN — Medication 81 MILLIGRAM(S): at 12:06

## 2022-10-04 RX ADMIN — LATANOPROST 1 DROP(S): 0.05 SOLUTION/ DROPS OPHTHALMIC; TOPICAL at 21:07

## 2022-10-04 RX ADMIN — PREGABALIN 1000 MICROGRAM(S): 225 CAPSULE ORAL at 17:16

## 2022-10-04 RX ADMIN — ATORVASTATIN CALCIUM 40 MILLIGRAM(S): 80 TABLET, FILM COATED ORAL at 21:07

## 2022-10-04 RX ADMIN — AMLODIPINE BESYLATE 2.5 MILLIGRAM(S): 2.5 TABLET ORAL at 15:53

## 2022-10-04 RX ADMIN — Medication 3 MILLIGRAM(S): at 21:07

## 2022-10-04 RX ADMIN — DORZOLAMIDE HYDROCHLORIDE TIMOLOL MALEATE 1 DROP(S): 20; 5 SOLUTION/ DROPS OPHTHALMIC at 21:07

## 2022-10-04 RX ADMIN — LEVETIRACETAM 500 MILLIGRAM(S): 250 TABLET, FILM COATED ORAL at 17:11

## 2022-10-04 NOTE — PROGRESS NOTE ADULT - PROBLEM SELECTOR PLAN 3
BP is still elevated in 150s-160s range  will start amlodipine 2.5mg with holding parameters
Monitor BP  If BP persistently elevated and MRI negative for acute CVA, will add antihypertensive

## 2022-10-04 NOTE — PROGRESS NOTE ADULT - PROBLEM SELECTOR PLAN 2
K 3.1, replace with oral potassium chloride
K 3.1, replaced  Monitor and replace serum electrolytes as needed

## 2022-10-04 NOTE — PROGRESS NOTE ADULT - SUBJECTIVE AND OBJECTIVE BOX
HPI:  No events overnight    EEG reveals sharp waves in the left temporal region    MRI brain - no acute pathology    Vital Signs Last 24 Hrs  T(C): 37 (04 Oct 2022 16:18), Max: 37 (04 Oct 2022 16:18)  T(F): 98.6 (04 Oct 2022 16:18), Max: 98.6 (04 Oct 2022 16:18)  HR: 90 (04 Oct 2022 16:18) (78 - 98)  BP: 154/89 (04 Oct 2022 16:18) (136/79 - 165/98)  BP(mean): --  RR: 18 (04 Oct 2022 16:18) (16 - 18)  SpO2: 97% (04 Oct 2022 16:18) (97% - 100%)    Parameters below as of 04 Oct 2022 11:22  Patient On (Oxygen Delivery Method): room air        MEDICATIONS  (STANDING):  amLODIPine   Tablet 2.5 milliGRAM(s) Oral daily  aspirin enteric coated 81 milliGRAM(s) Oral daily  atorvastatin 40 milliGRAM(s) Oral at bedtime  cyanocobalamin 1000 MICROGram(s) Oral daily  dorzolamide 2%/timolol 0.5% Ophthalmic Solution 1 Drop(s) Both EYES two times a day  enoxaparin Injectable 40 milliGRAM(s) SubCutaneous every 24 hours  latanoprost 0.005% Ophthalmic Solution 1 Drop(s) Both EYES at bedtime  levETIRAcetam 250 milliGRAM(s) Oral two times a day    MEDICATIONS  (PRN):  acetaminophen     Tablet .. 650 milliGRAM(s) Oral every 6 hours PRN Mild Pain (1 - 3)      ROS: pertinent positives in HPI, all other ROS were reviewed and are negative         Neurological exam:  HF: A x O x 3. Appropriately interactive, normal affect. Speech fluent, No Aphasia or paraphasic errors. Naming /repetition intact   CN: Blind (no light perception), facial sensation normal, no NLFD, tongue midline, Palate moves equally, SCM equal bilaterally  Motor: No pronator drift, Strength 5/5 in all 4 ext, normal bulk and tone, no tremor, rigidity or bradykinesia.    Sens: Intact to light touch / PP/ VS/ JS    Reflexes: Symmetric and normal . BJ 2+, BR 2+, KJ 2+, AJ 2+, downgoing toes b/l  Coord:  No FNFA, dysmetria, IVANA intact     NIHSS = 0        LABS:                         10.8   4.43  )-----------( 198      ( 04 Oct 2022 07:35 )             31.8     10-04    138  |  107  |  17  ----------------------------<  103<H>  3.5   |  24  |  0.84    Ca    8.9      04 Oct 2022 07:35  Phos  2.7     10-04  Mg     2.0     10-04    TPro  7.0  /  Alb  3.0<L>  /  TBili  0.3  /  DBili  x   /  AST  16  /  ALT  18  /  AlkPhos  69  10-04    LIVER FUNCTIONS - ( 04 Oct 2022 07:35 )  Alb: 3.0 g/dL / Pro: 7.0 gm/dL / ALK PHOS: 69 U/L / ALT: 18 U/L / AST: 16 U/L / GGT: x           10-03 Chol 203<H> LDL -- HDL 76 Trig 45      A/P:   89 yo with transient neurological symptoms, including visual perceptions, now resolved.  Abnormal EEG with epileptiform discharges in the left temporal region, suggesting this event may have been a focal seizure (similar event in the past).    Recommend:  1. Keppra 500mg x 1, then 250m gPO BID  2. Carotid US  3. TTE  4. Telemetry monitoring  5. Aspirin 81mg daily  6. Intensive statin therapy, target LDL < 70  7. Optimize control of BP  8. Memory impairment: check B12, folate, MMA, homocysteine, RPR, TSH  9. Stroke education  10. Seizure precautions, no driving or unsupervised swimming.    Rene Mayen MD  Neurology Attending Physician    
Reported much better, want to go home    All ROS were negative except confusion and possible hallucination upon presentation    Physical Exam  CONSTITUTIONAL: Well developed, well nourished, alert and cooperative, no acute distress  EYES: PERRL, no scleral icterus  ENT: Mucosa moist, tongue normal  NECK: Neck supple, trachea midline, non-tender  CARDIAC: Normal S1 and S2. Regular rate and rhythms. No murmurs. No Pedal edema. Peripheral pulses intact  LUNGS: Clear to auscultation, equal air entry both lungs. No rales, rhonchi, wheezing. Normal respiratory effort.   ABDOMEN: Soft, nondistended, nontender. No guarding or rebound tenderness. No hepatomegaly or splenomegaly. Bowel sound normal.   MUSCULOSKELETAL: Normocephalic, atraumatic. No significant deformity or joint abnormality  NEUROLOGICAL: No gross motor or sensory deficits. CN II-XII grossly intact  SKIN: no lesions or eruptions. Normal turgor  PSYCHIATRIC: A&O x 3, appropriate mood and affect                          10.8   4.43  )-----------( 198      ( 04 Oct 2022 07:35 )             31.8   10-04    138  |  107  |  17  ----------------------------<  103<H>  3.5   |  24  |  0.84    Ca    8.9      04 Oct 2022 07:35  Phos  2.7     10-04  Mg     2.0     10-04    TPro  7.0  /  Alb  3.0<L>  /  TBili  0.3  /  DBili  x   /  AST  16  /  ALT  18  /  AlkPhos  69  10-04    Vital Signs Last 24 Hrs  T(C): 36.6 (04 Oct 2022 11:22), Max: 36.8 (04 Oct 2022 05:04)  T(F): 97.8 (04 Oct 2022 11:22), Max: 98.2 (04 Oct 2022 05:04)  HR: 78 (04 Oct 2022 11:22) (78 - 98)  BP: 165/98 (04 Oct 2022 11:22) (136/79 - 165/98)  BP(mean): --  RR: 16 (04 Oct 2022 11:22) (16 - 18)  SpO2: 100% (04 Oct 2022 11:22) (97% - 100%)    Parameters below as of 04 Oct 2022 11:22  Patient On (Oxygen Delivery Method): room air    MEDICATIONS  (STANDING):  aspirin enteric coated 81 milliGRAM(s) Oral daily  atorvastatin 40 milliGRAM(s) Oral at bedtime  cyanocobalamin 1000 MICROGram(s) Oral daily  dorzolamide 2%/timolol 0.5% Ophthalmic Solution 1 Drop(s) Both EYES two times a day  enoxaparin Injectable 40 milliGRAM(s) SubCutaneous every 24 hours  latanoprost 0.005% Ophthalmic Solution 1 Drop(s) Both EYES at bedtime    MEDICATIONS  (PRN):  acetaminophen     Tablet .. 650 milliGRAM(s) Oral every 6 hours PRN Mild Pain (1 - 3)  
Reported feeling better but still not like herself totally    All ROS were negative except confusion, ? hallucination    Physical Exam  CONSTITUTIONAL: Well developed, well nourished, alert and cooperative, no acute distress  EYES: PERRL, no scleral icterus  ENT: Mucosa moist, tongue normal  NECK: Neck supple, trachea midline, non-tender  CARDIAC: Normal S1 and S2. Regular rate and rhythms. No murmurs. No Pedal edema. Peripheral pulses intact  LUNGS: Clear to auscultation, equal air entry both lungs. No rales, rhonchi, wheezing. Normal respiratory effort.   ABDOMEN: Soft, nondistended, nontender. No guarding or rebound tenderness. No hepatomegaly or splenomegaly. Bowel sound normal.   MUSCULOSKELETAL: Normocephalic, atraumatic. No significant deformity or joint abnormality  NEUROLOGICAL: No gross motor or sensory deficits. CN II-XII grossly intact  SKIN: no lesions or eruptions. Normal turgor  PSYCHIATRIC: A&O x 3, appropriate mood and affect                          11.5   4.13  )-----------( 219      ( 03 Oct 2022 08:20 )             34.2   10-03    141  |  108  |  13  ----------------------------<  111<H>  3.1<L>   |  26  |  0.81    Ca    8.9      03 Oct 2022 08:20  Phos  2.6     10-03  Mg     1.9     10-03    TPro  7.3  /  Alb  3.4  /  TBili  0.5  /  DBili  x   /  AST  19  /  ALT  18  /  AlkPhos  78  10-03    Vital Signs Last 24 Hrs  T(C): 36.4 (03 Oct 2022 10:47), Max: 37.7 (02 Oct 2022 13:58)  T(F): 97.6 (03 Oct 2022 10:47), Max: 99.8 (02 Oct 2022 13:58)  HR: 86 (03 Oct 2022 10:47) (73 - 109)  BP: 154/89 (03 Oct 2022 10:47) (128/80 - 179/111)  BP(mean): --  RR: 16 (03 Oct 2022 10:47) (16 - 19)  SpO2: 97% (03 Oct 2022 10:47) (95% - 100%)    Parameters below as of 03 Oct 2022 00:45  Patient On (Oxygen Delivery Method): room air    MEDICATIONS  (STANDING):  aspirin enteric coated 81 milliGRAM(s) Oral daily  atorvastatin 40 milliGRAM(s) Oral at bedtime  dorzolamide 2%/timolol 0.5% Ophthalmic Solution 1 Drop(s) Both EYES two times a day  enoxaparin Injectable 40 milliGRAM(s) SubCutaneous every 24 hours  potassium chloride    Tablet ER 20 milliEquivalent(s) Oral every 2 hours    MEDICATIONS  (PRN):  acetaminophen     Tablet .. 650 milliGRAM(s) Oral every 6 hours PRN Mild Pain (1 - 3)

## 2022-10-04 NOTE — PROGRESS NOTE ADULT - PROBLEM SELECTOR PLAN 1
present with confusion and ? hallucination  still not quite herself yet  CT head with no acute pathology  MRI brain with no acute pathology  add aspirin, continue high intensity statin  TSH 4.98, free thyroxine 1.2  B12 480, folate 12.9, RPR negative  UA not suggestive of UTI ( no UTI symptoms, moderate epithelial cell)    Neurology on board  Carotid doppler, EEG

## 2022-10-04 NOTE — EEG REPORT - NS EEG TEXT BOX
ORTIZ LYNCH [id number] 89y (10-Mar-1932)F  Admitting MD: Dr. Steffen Pace    Study Date: 10-04-22    --------------------------------------------------------------------------------------------------  History:  CC/ HPI Patient is a 89y old  Female who presents with a chief complaint of TIA (03 Oct 2022 18:07)    aspirin enteric coated 81 milliGRAM(s) Oral daily  atorvastatin 40 milliGRAM(s) Oral at bedtime  dorzolamide 2%/timolol 0.5% Ophthalmic Solution 1 Drop(s) Both EYES two times a day  enoxaparin Injectable 40 milliGRAM(s) SubCutaneous every 24 hours    --------------------------------------------------------------------------------------------------  Study Interpretation:    [[[Abbreviation Key:  PDR=alpha rhythm/posterior dominant rhythm. A-P=anterior posterior gradient.  Amplitude: ‘very low’:<20; ‘low’:20-50; ‘medium’:; ‘high’:>200uV.  Persistence for periodic/rhythmic patterns (% of epoch) ‘rare’:<1%; ‘occasional’:1-10%; ‘frequent’:10-50%; ‘abundant’:50-90%; ‘continuous’:>90%.  Persistence for sporadic discharges: ‘rare’:<1/hr; ‘occasional’:1/min-1/hr; ‘frequent’:>1/min; ‘abundant’:>1/10 sec.  GRDA=generalized rhythmic delta activity; FIRDA=frontal intermittent GRDA; LRDA=lateralized rhythmic delta activity; TIRDA=temporal intermittent rhythmic delta activity;  LPD=PLED=lateralized periodic discharges; GPD=generalized periodic discharges; BiPDs=BiPLEDs=bilateral independent periodic epileptiform discharges; SIRPID=stimulus induced rhythmic, periodic, or ictal appearing discharges; BIRDs=brief potentially ictal rhythmic discharges >4 Hz, lasting .5-10s; PFA (paroxysmal bursts >13 Hz or =8 Hz).  Modifiers: +F=with fast component; +S=with spike component; +R=with rhythmic component.  S-B=burst suppression pattern.  Max=maximal. N1-drowsy; N2-stage II sleep; N3-slow wave sleep. SSS/BETS=small sharp spikes/benign epileptiform transients of sleep. HV=hyperventilation; PS=photic stimulation]]]    FINDINGS:  The background was continuous, spontaneously variable and reactive.  During wakefulness, the posteriorly dominant rhythm consisted of symmetric, well modulated 7.5-8 Hz activity, with an amplitude to 40 uV, that attenuated to eye opening.  Low amplitude central beta was noted in wakefulness.    Background Slowing:  Generalized slowing: Diffuse theta even during max awake  Focal slowing: none was present.    Sleep Background:  -N2 sleep transients were not recorded.    Epileptiform Activity:   Occasional left temporal sharp waves, max F7    Events:  No clinical events were recorded.  No seizures were recorded.    Activation Procedures:   -Hyperventilation was not performed.    -Photic stimulation was not performed.    Artifacts:  Intermittent myogenic and external motion artifacts were noted.    ECG:  The heart rate on single channel ECG at baseline was predominantly near BPM = 80-90  -----------------------------------------------------------------------------------------------------    EEG Classification / Summary:  Abnormal EEG study, awake / drowsy / asleep      1. Sharp waves, left temporal, max F7  2. Background slowing, generalized, mild  -----------------------------------------------------------------------------------------------------    Clinical Impression:  Evidence for increased risk for seizures from the left temporal region  Mild diffuse/multifocal cerebral dysfunction, not specific as to etiology  There were no seizures captured     This is a prelim report only, pending review with attending prior to finalization.    -------------------------------------------------------------------------------------------------------  [facility] EEG Reading Room Ph#: [phone]  Epilepsy Answering Service after 5PM and before 8:30AM: Ph#: [phone]    Andreas Love M.D, epilepsy fellow   ORTIZ LYNCH [id number] 89y (10-Mar-1932)F  Admitting MD: Dr. Steffen Pace    Study Date: 10-04-22    --------------------------------------------------------------------------------------------------  History:  CC/ HPI Patient is a 89y old  Female who presents with a chief complaint of TIA (03 Oct 2022 18:07)    aspirin enteric coated 81 milliGRAM(s) Oral daily  atorvastatin 40 milliGRAM(s) Oral at bedtime  dorzolamide 2%/timolol 0.5% Ophthalmic Solution 1 Drop(s) Both EYES two times a day  enoxaparin Injectable 40 milliGRAM(s) SubCutaneous every 24 hours    --------------------------------------------------------------------------------------------------  Study Interpretation:    [[[Abbreviation Key:  PDR=alpha rhythm/posterior dominant rhythm. A-P=anterior posterior gradient.  Amplitude: ‘very low’:<20; ‘low’:20-50; ‘medium’:; ‘high’:>200uV.  Persistence for periodic/rhythmic patterns (% of epoch) ‘rare’:<1%; ‘occasional’:1-10%; ‘frequent’:10-50%; ‘abundant’:50-90%; ‘continuous’:>90%.  Persistence for sporadic discharges: ‘rare’:<1/hr; ‘occasional’:1/min-1/hr; ‘frequent’:>1/min; ‘abundant’:>1/10 sec.  GRDA=generalized rhythmic delta activity; FIRDA=frontal intermittent GRDA; LRDA=lateralized rhythmic delta activity; TIRDA=temporal intermittent rhythmic delta activity;  LPD=PLED=lateralized periodic discharges; GPD=generalized periodic discharges; BiPDs=BiPLEDs=bilateral independent periodic epileptiform discharges; SIRPID=stimulus induced rhythmic, periodic, or ictal appearing discharges; BIRDs=brief potentially ictal rhythmic discharges >4 Hz, lasting .5-10s; PFA (paroxysmal bursts >13 Hz or =8 Hz).  Modifiers: +F=with fast component; +S=with spike component; +R=with rhythmic component.  S-B=burst suppression pattern.  Max=maximal. N1-drowsy; N2-stage II sleep; N3-slow wave sleep. SSS/BETS=small sharp spikes/benign epileptiform transients of sleep. HV=hyperventilation; PS=photic stimulation]]]    FINDINGS:  The background was continuous, spontaneously variable and reactive.  During wakefulness, the posteriorly dominant rhythm consisted of symmetric, well modulated 7.5-8 Hz activity, with an amplitude to 40 uV, that attenuated to eye opening.  Low amplitude central beta was noted in wakefulness.    Background Slowing:  Generalized slowing: Diffuse theta even during max awake  Focal slowing: there is focal polymorphic delta slowing in the left temporal region.     Sleep Background:  -N2 sleep transients were not recorded.    Epileptiform Activity:   Occasional left temporal sharp waves, max F7    Events:  No clinical events were recorded.  No seizures were recorded.    Activation Procedures:   -Hyperventilation was not performed.    -Photic stimulation was not performed.    Artifacts:  Intermittent myogenic and external motion artifacts were noted.    ECG:  The heart rate on single channel ECG at baseline was predominantly near BPM = 80-90  -----------------------------------------------------------------------------------------------------    EEG Classification / Summary:  Abnormal EEG study, awake / drowsy / asleep      1. Sharp waves, focal, left temporal, max F7  2. intermittent polymorphic slowing focal, left temporal  2. Background slowing, generalized, mild  -----------------------------------------------------------------------------------------------------    Clinical Impression:  Evidence for increased risk for seizures from area of focal dysfunction in the left temporal region  Mild diffuse/multifocal cerebral dysfunction, not specific as to etiology  There were no seizures captured     -------------------------------------------------------------------------------------------------------  [facility] EEG Reading Room Ph#: [phone]  Epilepsy Answering Service after 5PM and before 8:30AM: Ph#: [phone]    Andreas Love M.D, epilepsy fellow    Rayshawn Viramontes MD PhD  Director, Epilepsy Division, [facility]

## 2022-10-04 NOTE — PHARMACOTHERAPY INTERVENTION NOTE - COMMENTS
OMR completed per patient's pharmacy. Current home med list is as follows:  ·	Latanoprost 0.005% - 1 drop in each eye at bedtime  ·	Travoprost 0.004&% - 1 drop in each eye at bedtime  ·	Dorzolamide 2% - 1 drop in each eye twice daily

## 2022-10-04 NOTE — PROGRESS NOTE ADULT - ASSESSMENT
89 yeas old female with h/o glaucoma, ? HTN ( not on meds) present to ED with confusion. Per daughter, patient called her saying that she is not feeing well. Appear to be confused and had difficulty articulating thoughts. Reported ? hallucination.   Slightly hypertensive, afebrile, sat well at RA. No leukocytosis. CXR image reviewed, no focal consolidation. CT head with no acute pathology.     Admitted with AMS
89 yeas old female with h/o glaucoma, ? HTN ( not on meds) present to ED with confusion. Per daughter, patient called her saying that she is not feeing well. Appear to be confused and had difficulty articulating thoughts. Reported ? hallucination.   Slightly hypertensive, afebrile, sat well at RA. No leukocytosis. CXR image reviewed, no focal consolidation. CT head with no acute pathology.     Admitted with AMS

## 2022-10-05 ENCOUNTER — TRANSCRIPTION ENCOUNTER (OUTPATIENT)
Age: 87
End: 2022-10-05

## 2022-10-05 VITALS
SYSTOLIC BLOOD PRESSURE: 130 MMHG | HEART RATE: 90 BPM | OXYGEN SATURATION: 97 % | RESPIRATION RATE: 16 BRPM | DIASTOLIC BLOOD PRESSURE: 89 MMHG | TEMPERATURE: 98 F

## 2022-10-05 LAB
ALBUMIN SERPL ELPH-MCNC: 2.9 G/DL — LOW (ref 3.3–5)
ALP SERPL-CCNC: 64 U/L — SIGNIFICANT CHANGE UP (ref 40–120)
ALT FLD-CCNC: 22 U/L — SIGNIFICANT CHANGE UP (ref 12–78)
ANION GAP SERPL CALC-SCNC: 8 MMOL/L — SIGNIFICANT CHANGE UP (ref 5–17)
AST SERPL-CCNC: 24 U/L — SIGNIFICANT CHANGE UP (ref 15–37)
BILIRUB SERPL-MCNC: 0.3 MG/DL — SIGNIFICANT CHANGE UP (ref 0.2–1.2)
BUN SERPL-MCNC: 21 MG/DL — SIGNIFICANT CHANGE UP (ref 7–23)
CALCIUM SERPL-MCNC: 8.9 MG/DL — SIGNIFICANT CHANGE UP (ref 8.5–10.1)
CHLORIDE SERPL-SCNC: 109 MMOL/L — HIGH (ref 96–108)
CO2 SERPL-SCNC: 24 MMOL/L — SIGNIFICANT CHANGE UP (ref 22–31)
CREAT SERPL-MCNC: 0.85 MG/DL — SIGNIFICANT CHANGE UP (ref 0.5–1.3)
EGFR: 65 ML/MIN/1.73M2 — SIGNIFICANT CHANGE UP
GLUCOSE SERPL-MCNC: 95 MG/DL — SIGNIFICANT CHANGE UP (ref 70–99)
HCT VFR BLD CALC: 31.5 % — LOW (ref 34.5–45)
HGB BLD-MCNC: 10.6 G/DL — LOW (ref 11.5–15.5)
MAGNESIUM SERPL-MCNC: 1.9 MG/DL — SIGNIFICANT CHANGE UP (ref 1.6–2.6)
MCHC RBC-ENTMCNC: 30.9 PG — SIGNIFICANT CHANGE UP (ref 27–34)
MCHC RBC-ENTMCNC: 33.7 G/DL — SIGNIFICANT CHANGE UP (ref 32–36)
MCV RBC AUTO: 91.8 FL — SIGNIFICANT CHANGE UP (ref 80–100)
NRBC # BLD: 0 /100 WBCS — SIGNIFICANT CHANGE UP (ref 0–0)
PHOSPHATE SERPL-MCNC: 3.4 MG/DL — SIGNIFICANT CHANGE UP (ref 2.5–4.5)
PLATELET # BLD AUTO: 199 K/UL — SIGNIFICANT CHANGE UP (ref 150–400)
POTASSIUM SERPL-MCNC: 3.9 MMOL/L — SIGNIFICANT CHANGE UP (ref 3.5–5.3)
POTASSIUM SERPL-SCNC: 3.9 MMOL/L — SIGNIFICANT CHANGE UP (ref 3.5–5.3)
PROT SERPL-MCNC: 6.3 GM/DL — SIGNIFICANT CHANGE UP (ref 6–8.3)
RBC # BLD: 3.43 M/UL — LOW (ref 3.8–5.2)
RBC # FLD: 14.3 % — SIGNIFICANT CHANGE UP (ref 10.3–14.5)
SODIUM SERPL-SCNC: 141 MMOL/L — SIGNIFICANT CHANGE UP (ref 135–145)
WBC # BLD: 4.32 K/UL — SIGNIFICANT CHANGE UP (ref 3.8–10.5)
WBC # FLD AUTO: 4.32 K/UL — SIGNIFICANT CHANGE UP (ref 3.8–10.5)

## 2022-10-05 PROCEDURE — 93880 EXTRACRANIAL BILAT STUDY: CPT | Mod: 26

## 2022-10-05 PROCEDURE — 99238 HOSP IP/OBS DSCHRG MGMT 30/<: CPT

## 2022-10-05 RX ORDER — AMLODIPINE BESYLATE 2.5 MG/1
1 TABLET ORAL
Qty: 30 | Refills: 0
Start: 2022-10-05 | End: 2022-11-03

## 2022-10-05 RX ORDER — ASPIRIN/CALCIUM CARB/MAGNESIUM 324 MG
1 TABLET ORAL
Qty: 30 | Refills: 0
Start: 2022-10-05 | End: 2022-11-03

## 2022-10-05 RX ORDER — ATORVASTATIN CALCIUM 80 MG/1
1 TABLET, FILM COATED ORAL
Qty: 30 | Refills: 0
Start: 2022-10-05 | End: 2022-11-03

## 2022-10-05 RX ORDER — PREGABALIN 225 MG/1
1 CAPSULE ORAL
Qty: 30 | Refills: 0
Start: 2022-10-05 | End: 2022-11-03

## 2022-10-05 RX ORDER — LEVETIRACETAM 250 MG/1
1 TABLET, FILM COATED ORAL
Qty: 60 | Refills: 0
Start: 2022-10-05 | End: 2022-11-03

## 2022-10-05 RX ADMIN — AMLODIPINE BESYLATE 2.5 MILLIGRAM(S): 2.5 TABLET ORAL at 05:00

## 2022-10-05 RX ADMIN — Medication 81 MILLIGRAM(S): at 13:48

## 2022-10-05 RX ADMIN — DORZOLAMIDE HYDROCHLORIDE TIMOLOL MALEATE 1 DROP(S): 20; 5 SOLUTION/ DROPS OPHTHALMIC at 05:01

## 2022-10-05 RX ADMIN — PREGABALIN 1000 MICROGRAM(S): 225 CAPSULE ORAL at 13:48

## 2022-10-05 RX ADMIN — ENOXAPARIN SODIUM 40 MILLIGRAM(S): 100 INJECTION SUBCUTANEOUS at 05:01

## 2022-10-05 RX ADMIN — LEVETIRACETAM 250 MILLIGRAM(S): 250 TABLET, FILM COATED ORAL at 05:00

## 2022-10-05 NOTE — DISCHARGE NOTE NURSING/CASE MANAGEMENT/SOCIAL WORK - PATIENT PORTAL LINK FT
You can access the FollowMyHealth Patient Portal offered by [facility] by registering at the following website: [email or web address]. By joining [employer]’s FollowMyHealth portal, you will also be able to view your health information using other applications (apps) compatible with our system.

## 2022-10-05 NOTE — DISCHARGE NOTE PROVIDER - CARE PROVIDERS DIRECT ADDRESSES
,DirectAddress_Unknown,[email or web address]

## 2022-10-05 NOTE — DISCHARGE NOTE PROVIDER - ATTENDING DISCHARGE PHYSICAL EXAMINATION:
CONSTITUTIONAL: Well developed, well nourished, alert and cooperative, no acute distress  EYES: PERRL, no scleral icterus  ENT: Mucosa moist, tongue normal  NECK: Neck supple, trachea midline, non-tender  CARDIAC: Normal S1 and S2. Regular rate and rhythms. No murmurs. No Pedal edema. Peripheral pulses intact  LUNGS: Clear to auscultation, equal air entry both lungs. No rales, rhonchi, wheezing. Normal respiratory effort.   ABDOMEN: Soft, nondistended, nontender. No guarding or rebound tenderness. No hepatomegaly or splenomegaly. Bowel sound normal.   MUSCULOSKELETAL: Normocephalic, atraumatic. No significant deformity or joint abnormality  NEUROLOGICAL: No gross motor or sensory deficits. CN II-XII grossly intact  SKIN: no lesions or eruptions. Normal turgor  PSYCHIATRIC: A&O x 3, appropriate mood and affect

## 2022-10-05 NOTE — DISCHARGE NOTE PROVIDER - NSDCMRMEDTOKEN_GEN_ALL_CORE_FT
Adult Aspirin Regimen 81 mg oral delayed release tablet: 1 tab(s) orally once a day  amLODIPine 2.5 mg oral tablet: 1 tab(s) orally once a day  B-12 1000 mcg oral tablet: 1 tab(s) orally once a day  dorzolamide 2% ophthalmic solution: 1 drop(s) in each eye 2 times a day  Keppra 250 mg oral tablet: 1 tab(s) orally 2 times a day  latanoprost 0.005% ophthalmic solution: 1 drop(s) in each eye once a day (at bedtime)  Lipitor 40 mg oral tablet: 1 tab(s) orally once a day (at bedtime)  travoprost 0.004% ophthalmic solution: 1 drop(s) in each eye once a day (at bedtime)

## 2022-10-05 NOTE — DISCHARGE NOTE PROVIDER - CARE PROVIDER_API CALL
PCP,   Phone: (   )    -  Fax: (   )    -  Established Patient  Follow Up Time: 2 weeks    Rene Mayen)  Clinical Neurophysiology; Neurology  [address] [place] [address] [address]  [place] [address]  Phone: [phone]  Fax: [phone]  Established Patient  Follow Up Time: 2 weeks

## 2022-10-05 NOTE — DISCHARGE NOTE NURSING/CASE MANAGEMENT/SOCIAL WORK - NSDCPEFALRISK_GEN_ALL_CORE
For information on Fall & Injury Prevention, visit: [email or web address] OR  [email or web address] OR  https://www.cdc.gov/steadi/patient.html

## 2022-10-05 NOTE — DISCHARGE NOTE PROVIDER - HOSPITAL COURSE
89 yeas old female with h/o glaucoma, ? HTN ( not on meds) present to ED with confusion. Per daughter, patient called her saying that she is not feeing well. Appear to be confused and had difficulty articulating thoughts. Reported ? hallucination.  CT head with no acute pathology. MRI brain with no acute pathology. ECHO with LVEF 60-65%, normal LV systolic function. GI DD. EEG with evidence for increased risk of seizures from area of focal dysfunction in left temporal region. Neurology consulted, loaded with keppra 500mg and recommended to continue with keppra 250mg bid. BP elevated during hospital course, started on amlodipine 2.5mg. PT evaluated, recommended home PT. Patient is medically stable to be discharged with outpatient PCP and neurology follow up

## 2022-10-05 NOTE — DISCHARGE NOTE PROVIDER - PROVIDER TOKENS
FREE:[LAST:[PCP],PHONE:[(   )    -],FAX:[(   )    -],FOLLOWUP:[2 weeks],ESTABLISHEDPATIENT:[T]],PROVIDER:[TOKEN:[[id number]],FOLLOWUP:[2 weeks],ESTABLISHEDPATIENT:[T]]

## 2022-10-05 NOTE — DISCHARGE NOTE PROVIDER - NSDCFUSCHEDAPPT_GEN_ALL_CORE_FT
Poli Pearson  [facility]  [facility]M [address] [facility]  Scheduled Appointment: 10/12/2022

## 2022-10-05 NOTE — DISCHARGE NOTE PROVIDER - NSDCCPCAREPLAN_GEN_ALL_CORE_FT
PRINCIPAL DISCHARGE DIAGNOSIS  Diagnosis: Syncope  Assessment and Plan of Treatment:       SECONDARY DISCHARGE DIAGNOSES  Diagnosis: Abnormal EEG  Assessment and Plan of Treatment:     Diagnosis: Benign essential HTN  Assessment and Plan of Treatment:     Diagnosis: Hyperlipidemia, unspecified  Assessment and Plan of Treatment:     Diagnosis: Glaucoma  Assessment and Plan of Treatment:

## 2022-10-08 LAB — METHYLMALONATE SERPL-SCNC: 221 NMOL/L — SIGNIFICANT CHANGE UP (ref 0–378)

## 2022-10-12 ENCOUNTER — APPOINTMENT (OUTPATIENT)
Dept: OPHTHALMOLOGY | Facility: CLINIC | Age: 87
End: 2022-10-12

## 2022-10-14 DIAGNOSIS — H35.30 UNSPECIFIED MACULAR DEGENERATION: ICD-10-CM

## 2022-10-14 DIAGNOSIS — H40.9 UNSPECIFIED GLAUCOMA: ICD-10-CM

## 2022-10-14 DIAGNOSIS — H54.8 LEGAL BLINDNESS, AS DEFINED IN USA: ICD-10-CM

## 2022-10-14 DIAGNOSIS — E87.6 HYPOKALEMIA: ICD-10-CM

## 2022-10-14 DIAGNOSIS — G45.9 TRANSIENT CEREBRAL ISCHEMIC ATTACK, UNSPECIFIED: ICD-10-CM

## 2022-10-14 DIAGNOSIS — Z28.310 UNVACCINATED FOR COVID-19: ICD-10-CM

## 2022-10-14 DIAGNOSIS — R94.01 ABNORMAL ELECTROENCEPHALOGRAM [EEG]: ICD-10-CM

## 2022-10-14 DIAGNOSIS — E78.5 HYPERLIPIDEMIA, UNSPECIFIED: ICD-10-CM

## 2022-10-14 DIAGNOSIS — I10 ESSENTIAL (PRIMARY) HYPERTENSION: ICD-10-CM

## 2023-11-01 ENCOUNTER — INPATIENT (INPATIENT)
Facility: HOSPITAL | Age: 88
LOS: 7 days | Discharge: HOME HEALTH | End: 2023-11-09
Attending: GENERAL ACUTE CARE HOSPITAL | Admitting: GENERAL ACUTE CARE HOSPITAL
Payer: MEDICARE

## 2023-11-01 VITALS
RESPIRATION RATE: 19 BRPM | DIASTOLIC BLOOD PRESSURE: 75 MMHG | SYSTOLIC BLOOD PRESSURE: 149 MMHG | HEIGHT: 64 IN | WEIGHT: 149.91 LBS | TEMPERATURE: 99 F | HEART RATE: 117 BPM | OXYGEN SATURATION: 95 %

## 2023-11-01 DIAGNOSIS — R65.10 SYSTEMIC INFLAMMATORY RESPONSE SYNDROME (SIRS) OF NON-INFECTIOUS ORIGIN WITHOUT ACUTE ORGAN DYSFUNCTION: ICD-10-CM

## 2023-11-01 DIAGNOSIS — H40.9 UNSPECIFIED GLAUCOMA: ICD-10-CM

## 2023-11-01 DIAGNOSIS — E87.6 HYPOKALEMIA: ICD-10-CM

## 2023-11-01 DIAGNOSIS — E87.20 ACIDOSIS, UNSPECIFIED: ICD-10-CM

## 2023-11-01 DIAGNOSIS — G93.41 METABOLIC ENCEPHALOPATHY: ICD-10-CM

## 2023-11-01 DIAGNOSIS — I10 ESSENTIAL (PRIMARY) HYPERTENSION: ICD-10-CM

## 2023-11-01 LAB
ALBUMIN SERPL ELPH-MCNC: 3.5 G/DL — SIGNIFICANT CHANGE UP (ref 3.3–5)
ALBUMIN SERPL ELPH-MCNC: 3.5 G/DL — SIGNIFICANT CHANGE UP (ref 3.3–5)
ALP SERPL-CCNC: 84 U/L — SIGNIFICANT CHANGE UP (ref 40–120)
ALP SERPL-CCNC: 84 U/L — SIGNIFICANT CHANGE UP (ref 40–120)
ALT FLD-CCNC: 23 U/L — SIGNIFICANT CHANGE UP (ref 12–78)
ALT FLD-CCNC: 23 U/L — SIGNIFICANT CHANGE UP (ref 12–78)
ANION GAP SERPL CALC-SCNC: 11 MMOL/L — SIGNIFICANT CHANGE UP (ref 5–17)
ANION GAP SERPL CALC-SCNC: 11 MMOL/L — SIGNIFICANT CHANGE UP (ref 5–17)
APPEARANCE CSF: CLEAR — SIGNIFICANT CHANGE UP
APPEARANCE CSF: CLEAR — SIGNIFICANT CHANGE UP
APPEARANCE UR: ABNORMAL
APPEARANCE UR: ABNORMAL
APTT BLD: 24.1 SEC — LOW (ref 24.5–35.6)
APTT BLD: 24.1 SEC — LOW (ref 24.5–35.6)
AST SERPL-CCNC: 19 U/L — SIGNIFICANT CHANGE UP (ref 15–37)
AST SERPL-CCNC: 19 U/L — SIGNIFICANT CHANGE UP (ref 15–37)
BACTERIA # UR AUTO: ABNORMAL /HPF
BACTERIA # UR AUTO: ABNORMAL /HPF
BASE EXCESS BLDV CALC-SCNC: 1.7 MMOL/L — SIGNIFICANT CHANGE UP (ref -2–3)
BASE EXCESS BLDV CALC-SCNC: 1.7 MMOL/L — SIGNIFICANT CHANGE UP (ref -2–3)
BASOPHILS # BLD AUTO: 0.02 K/UL — SIGNIFICANT CHANGE UP (ref 0–0.2)
BASOPHILS # BLD AUTO: 0.02 K/UL — SIGNIFICANT CHANGE UP (ref 0–0.2)
BASOPHILS NFR BLD AUTO: 0.2 % — SIGNIFICANT CHANGE UP (ref 0–2)
BASOPHILS NFR BLD AUTO: 0.2 % — SIGNIFICANT CHANGE UP (ref 0–2)
BILIRUB SERPL-MCNC: 0.7 MG/DL — SIGNIFICANT CHANGE UP (ref 0.2–1.2)
BILIRUB SERPL-MCNC: 0.7 MG/DL — SIGNIFICANT CHANGE UP (ref 0.2–1.2)
BILIRUB UR-MCNC: NEGATIVE — SIGNIFICANT CHANGE UP
BILIRUB UR-MCNC: NEGATIVE — SIGNIFICANT CHANGE UP
BLOOD GAS COMMENTS, VENOUS: SIGNIFICANT CHANGE UP
BLOOD GAS COMMENTS, VENOUS: SIGNIFICANT CHANGE UP
BUN SERPL-MCNC: 13 MG/DL — SIGNIFICANT CHANGE UP (ref 7–23)
BUN SERPL-MCNC: 13 MG/DL — SIGNIFICANT CHANGE UP (ref 7–23)
CALCIUM SERPL-MCNC: 9.2 MG/DL — SIGNIFICANT CHANGE UP (ref 8.5–10.1)
CALCIUM SERPL-MCNC: 9.2 MG/DL — SIGNIFICANT CHANGE UP (ref 8.5–10.1)
CHLORIDE BLDV-SCNC: 101 MMOL/L — SIGNIFICANT CHANGE UP (ref 98–107)
CHLORIDE BLDV-SCNC: 101 MMOL/L — SIGNIFICANT CHANGE UP (ref 98–107)
CHLORIDE SERPL-SCNC: 105 MMOL/L — SIGNIFICANT CHANGE UP (ref 96–108)
CHLORIDE SERPL-SCNC: 105 MMOL/L — SIGNIFICANT CHANGE UP (ref 96–108)
CO2 BLDV-SCNC: 27 MMOL/L — HIGH (ref 22–26)
CO2 BLDV-SCNC: 27 MMOL/L — HIGH (ref 22–26)
CO2 SERPL-SCNC: 21 MMOL/L — LOW (ref 22–31)
CO2 SERPL-SCNC: 21 MMOL/L — LOW (ref 22–31)
COLOR CSF: SIGNIFICANT CHANGE UP
COLOR CSF: SIGNIFICANT CHANGE UP
COLOR SPEC: YELLOW — SIGNIFICANT CHANGE UP
COLOR SPEC: YELLOW — SIGNIFICANT CHANGE UP
CREAT SERPL-MCNC: 1 MG/DL — SIGNIFICANT CHANGE UP (ref 0.5–1.3)
CREAT SERPL-MCNC: 1 MG/DL — SIGNIFICANT CHANGE UP (ref 0.5–1.3)
CSF PCR RESULT: SIGNIFICANT CHANGE UP
CSF PCR RESULT: SIGNIFICANT CHANGE UP
DIFF PNL FLD: ABNORMAL
DIFF PNL FLD: ABNORMAL
EGFR: 53 ML/MIN/1.73M2 — LOW
EGFR: 53 ML/MIN/1.73M2 — LOW
EOSINOPHIL # BLD AUTO: 0 K/UL — SIGNIFICANT CHANGE UP (ref 0–0.5)
EOSINOPHIL # BLD AUTO: 0 K/UL — SIGNIFICANT CHANGE UP (ref 0–0.5)
EOSINOPHIL NFR BLD AUTO: 0 % — SIGNIFICANT CHANGE UP (ref 0–6)
EOSINOPHIL NFR BLD AUTO: 0 % — SIGNIFICANT CHANGE UP (ref 0–6)
EPI CELLS # UR: PRESENT
EPI CELLS # UR: PRESENT
FLUAV AG NPH QL: SIGNIFICANT CHANGE UP
FLUAV AG NPH QL: SIGNIFICANT CHANGE UP
FLUBV AG NPH QL: SIGNIFICANT CHANGE UP
FLUBV AG NPH QL: SIGNIFICANT CHANGE UP
GAS PNL BLDV: 134 MMOL/L — LOW (ref 136–145)
GAS PNL BLDV: 134 MMOL/L — LOW (ref 136–145)
GAS PNL BLDV: SIGNIFICANT CHANGE UP
GLUCOSE BLDV-MCNC: 176 MG/DL — HIGH (ref 65–95)
GLUCOSE BLDV-MCNC: 176 MG/DL — HIGH (ref 65–95)
GLUCOSE CSF-MCNC: 90 MG/DL — HIGH (ref 40–70)
GLUCOSE CSF-MCNC: 90 MG/DL — HIGH (ref 40–70)
GLUCOSE SERPL-MCNC: 181 MG/DL — HIGH (ref 70–99)
GLUCOSE SERPL-MCNC: 181 MG/DL — HIGH (ref 70–99)
GLUCOSE UR QL: NEGATIVE MG/DL — SIGNIFICANT CHANGE UP
GLUCOSE UR QL: NEGATIVE MG/DL — SIGNIFICANT CHANGE UP
GRAM STN FLD: SIGNIFICANT CHANGE UP
GRAM STN FLD: SIGNIFICANT CHANGE UP
HCO3 BLDV-SCNC: 26 MMOL/L — SIGNIFICANT CHANGE UP (ref 22–28)
HCO3 BLDV-SCNC: 26 MMOL/L — SIGNIFICANT CHANGE UP (ref 22–28)
HCT VFR BLD CALC: 34.9 % — SIGNIFICANT CHANGE UP (ref 34.5–45)
HCT VFR BLD CALC: 34.9 % — SIGNIFICANT CHANGE UP (ref 34.5–45)
HCT VFR BLDA CALC: 38 % — SIGNIFICANT CHANGE UP (ref 37–47)
HCT VFR BLDA CALC: 38 % — SIGNIFICANT CHANGE UP (ref 37–47)
HGB BLD CALC-MCNC: 12.8 G/DL — SIGNIFICANT CHANGE UP (ref 11.7–16.1)
HGB BLD CALC-MCNC: 12.8 G/DL — SIGNIFICANT CHANGE UP (ref 11.7–16.1)
HGB BLD-MCNC: 12.2 G/DL — SIGNIFICANT CHANGE UP (ref 11.5–15.5)
HGB BLD-MCNC: 12.2 G/DL — SIGNIFICANT CHANGE UP (ref 11.5–15.5)
IMM GRANULOCYTES NFR BLD AUTO: 0.4 % — SIGNIFICANT CHANGE UP (ref 0–0.9)
IMM GRANULOCYTES NFR BLD AUTO: 0.4 % — SIGNIFICANT CHANGE UP (ref 0–0.9)
INR BLD: 1.02 RATIO — SIGNIFICANT CHANGE UP (ref 0.85–1.18)
INR BLD: 1.02 RATIO — SIGNIFICANT CHANGE UP (ref 0.85–1.18)
KETONES UR-MCNC: ABNORMAL MG/DL
KETONES UR-MCNC: ABNORMAL MG/DL
LACTATE BLDV-MCNC: 4 MMOL/L — HIGH (ref 0.56–1.39)
LACTATE BLDV-MCNC: 4 MMOL/L — HIGH (ref 0.56–1.39)
LACTATE SERPL-SCNC: 0.9 MMOL/L — SIGNIFICANT CHANGE UP (ref 0.7–2)
LACTATE SERPL-SCNC: 0.9 MMOL/L — SIGNIFICANT CHANGE UP (ref 0.7–2)
LACTATE SERPL-SCNC: 3.5 MMOL/L — HIGH (ref 0.7–2)
LACTATE SERPL-SCNC: 3.5 MMOL/L — HIGH (ref 0.7–2)
LEUKOCYTE ESTERASE UR-ACNC: ABNORMAL
LEUKOCYTE ESTERASE UR-ACNC: ABNORMAL
LYMPHOCYTES # BLD AUTO: 1.22 K/UL — SIGNIFICANT CHANGE UP (ref 1–3.3)
LYMPHOCYTES # BLD AUTO: 1.22 K/UL — SIGNIFICANT CHANGE UP (ref 1–3.3)
LYMPHOCYTES # BLD AUTO: 14.6 % — SIGNIFICANT CHANGE UP (ref 13–44)
LYMPHOCYTES # BLD AUTO: 14.6 % — SIGNIFICANT CHANGE UP (ref 13–44)
LYMPHOCYTES # CSF: 4 % — LOW (ref 40–80)
LYMPHOCYTES # CSF: 4 % — LOW (ref 40–80)
MCHC RBC-ENTMCNC: 31.5 PG — SIGNIFICANT CHANGE UP (ref 27–34)
MCHC RBC-ENTMCNC: 31.5 PG — SIGNIFICANT CHANGE UP (ref 27–34)
MCHC RBC-ENTMCNC: 35 G/DL — SIGNIFICANT CHANGE UP (ref 32–36)
MCHC RBC-ENTMCNC: 35 G/DL — SIGNIFICANT CHANGE UP (ref 32–36)
MCV RBC AUTO: 90.2 FL — SIGNIFICANT CHANGE UP (ref 80–100)
MCV RBC AUTO: 90.2 FL — SIGNIFICANT CHANGE UP (ref 80–100)
MONOCYTES # BLD AUTO: 0.51 K/UL — SIGNIFICANT CHANGE UP (ref 0–0.9)
MONOCYTES # BLD AUTO: 0.51 K/UL — SIGNIFICANT CHANGE UP (ref 0–0.9)
MONOCYTES NFR BLD AUTO: 6.1 % — SIGNIFICANT CHANGE UP (ref 2–14)
MONOCYTES NFR BLD AUTO: 6.1 % — SIGNIFICANT CHANGE UP (ref 2–14)
NEUTROPHILS # BLD AUTO: 6.59 K/UL — SIGNIFICANT CHANGE UP (ref 1.8–7.4)
NEUTROPHILS # BLD AUTO: 6.59 K/UL — SIGNIFICANT CHANGE UP (ref 1.8–7.4)
NEUTROPHILS # CSF: 96 % — HIGH (ref 0–6)
NEUTROPHILS # CSF: 96 % — HIGH (ref 0–6)
NEUTROPHILS NFR BLD AUTO: 78.7 % — HIGH (ref 43–77)
NEUTROPHILS NFR BLD AUTO: 78.7 % — HIGH (ref 43–77)
NITRITE UR-MCNC: NEGATIVE — SIGNIFICANT CHANGE UP
NITRITE UR-MCNC: NEGATIVE — SIGNIFICANT CHANGE UP
NRBC # BLD: 0 /100 WBCS — SIGNIFICANT CHANGE UP (ref 0–0)
NRBC # BLD: 0 /100 WBCS — SIGNIFICANT CHANGE UP (ref 0–0)
NRBC NFR CSF: 1 /UL — SIGNIFICANT CHANGE UP (ref 0–5)
NRBC NFR CSF: 1 /UL — SIGNIFICANT CHANGE UP (ref 0–5)
PCO2 BLDV: 38 MMHG — LOW (ref 42–55)
PCO2 BLDV: 38 MMHG — LOW (ref 42–55)
PH BLDV: 7.44 — HIGH (ref 7.32–7.43)
PH BLDV: 7.44 — HIGH (ref 7.32–7.43)
PH UR: 7.5 — SIGNIFICANT CHANGE UP (ref 5–8)
PH UR: 7.5 — SIGNIFICANT CHANGE UP (ref 5–8)
PLATELET # BLD AUTO: 219 K/UL — SIGNIFICANT CHANGE UP (ref 150–400)
PLATELET # BLD AUTO: 219 K/UL — SIGNIFICANT CHANGE UP (ref 150–400)
PO2 BLDV: 53 MMHG — HIGH (ref 25–45)
PO2 BLDV: 53 MMHG — HIGH (ref 25–45)
POTASSIUM BLDV-SCNC: 3.9 MMOL/L — SIGNIFICANT CHANGE UP (ref 3.5–5.1)
POTASSIUM BLDV-SCNC: 3.9 MMOL/L — SIGNIFICANT CHANGE UP (ref 3.5–5.1)
POTASSIUM SERPL-MCNC: 3 MMOL/L — LOW (ref 3.5–5.3)
POTASSIUM SERPL-MCNC: 3 MMOL/L — LOW (ref 3.5–5.3)
POTASSIUM SERPL-SCNC: 3 MMOL/L — LOW (ref 3.5–5.3)
POTASSIUM SERPL-SCNC: 3 MMOL/L — LOW (ref 3.5–5.3)
PROT CSF-MCNC: 58 MG/DL — HIGH (ref 15–45)
PROT CSF-MCNC: 58 MG/DL — HIGH (ref 15–45)
PROT SERPL-MCNC: 8.3 GM/DL — SIGNIFICANT CHANGE UP (ref 6–8.3)
PROT SERPL-MCNC: 8.3 GM/DL — SIGNIFICANT CHANGE UP (ref 6–8.3)
PROT UR-MCNC: SIGNIFICANT CHANGE UP MG/DL
PROT UR-MCNC: SIGNIFICANT CHANGE UP MG/DL
PROTHROM AB SERPL-ACNC: 12.2 SEC — SIGNIFICANT CHANGE UP (ref 9.5–13)
PROTHROM AB SERPL-ACNC: 12.2 SEC — SIGNIFICANT CHANGE UP (ref 9.5–13)
RBC # BLD: 3.87 M/UL — SIGNIFICANT CHANGE UP (ref 3.8–5.2)
RBC # BLD: 3.87 M/UL — SIGNIFICANT CHANGE UP (ref 3.8–5.2)
RBC # CSF: 6 /UL — HIGH (ref 0–0)
RBC # CSF: 6 /UL — HIGH (ref 0–0)
RBC # FLD: 13.8 % — SIGNIFICANT CHANGE UP (ref 10.3–14.5)
RBC # FLD: 13.8 % — SIGNIFICANT CHANGE UP (ref 10.3–14.5)
RBC CASTS # UR COMP ASSIST: SIGNIFICANT CHANGE UP /HPF (ref 0–4)
RBC CASTS # UR COMP ASSIST: SIGNIFICANT CHANGE UP /HPF (ref 0–4)
SAO2 % BLDV: 78.5 % — LOW (ref 94–98)
SAO2 % BLDV: 78.5 % — LOW (ref 94–98)
SARS-COV-2 RNA SPEC QL NAA+PROBE: SIGNIFICANT CHANGE UP
SARS-COV-2 RNA SPEC QL NAA+PROBE: SIGNIFICANT CHANGE UP
SODIUM SERPL-SCNC: 137 MMOL/L — SIGNIFICANT CHANGE UP (ref 135–145)
SODIUM SERPL-SCNC: 137 MMOL/L — SIGNIFICANT CHANGE UP (ref 135–145)
SP GR SPEC: 1.01 — SIGNIFICANT CHANGE UP (ref 1–1.03)
SP GR SPEC: 1.01 — SIGNIFICANT CHANGE UP (ref 1–1.03)
SPECIMEN SOURCE: SIGNIFICANT CHANGE UP
SPECIMEN SOURCE: SIGNIFICANT CHANGE UP
TROPONIN I, HIGH SENSITIVITY RESULT: 36 NG/L — SIGNIFICANT CHANGE UP
TROPONIN I, HIGH SENSITIVITY RESULT: 36 NG/L — SIGNIFICANT CHANGE UP
TUBE TYPE: SIGNIFICANT CHANGE UP
TUBE TYPE: SIGNIFICANT CHANGE UP
UROBILINOGEN FLD QL: 1 MG/DL — SIGNIFICANT CHANGE UP (ref 0.2–1)
UROBILINOGEN FLD QL: 1 MG/DL — SIGNIFICANT CHANGE UP (ref 0.2–1)
WBC # BLD: 8.37 K/UL — SIGNIFICANT CHANGE UP (ref 3.8–10.5)
WBC # BLD: 8.37 K/UL — SIGNIFICANT CHANGE UP (ref 3.8–10.5)
WBC # FLD AUTO: 8.37 K/UL — SIGNIFICANT CHANGE UP (ref 3.8–10.5)
WBC # FLD AUTO: 8.37 K/UL — SIGNIFICANT CHANGE UP (ref 3.8–10.5)
WBC UR QL: SIGNIFICANT CHANGE UP /HPF (ref 0–5)
WBC UR QL: SIGNIFICANT CHANGE UP /HPF (ref 0–5)

## 2023-11-01 PROCEDURE — 99222 1ST HOSP IP/OBS MODERATE 55: CPT

## 2023-11-01 PROCEDURE — 93010 ELECTROCARDIOGRAM REPORT: CPT

## 2023-11-01 PROCEDURE — 70450 CT HEAD/BRAIN W/O DYE: CPT | Mod: 26,MA

## 2023-11-01 PROCEDURE — 71045 X-RAY EXAM CHEST 1 VIEW: CPT | Mod: 26

## 2023-11-01 PROCEDURE — 62270 DX LMBR SPI PNXR: CPT

## 2023-11-01 PROCEDURE — 99285 EMERGENCY DEPT VISIT HI MDM: CPT | Mod: 25

## 2023-11-01 RX ORDER — CEFTRIAXONE 500 MG/1
1000 INJECTION, POWDER, FOR SOLUTION INTRAMUSCULAR; INTRAVENOUS ONCE
Refills: 0 | Status: COMPLETED | OUTPATIENT
Start: 2023-11-01 | End: 2023-11-01

## 2023-11-01 RX ORDER — VANCOMYCIN HCL 1 G
1000 VIAL (EA) INTRAVENOUS ONCE
Refills: 0 | Status: DISCONTINUED | OUTPATIENT
Start: 2023-11-01 | End: 2023-11-01

## 2023-11-01 RX ORDER — TRAVOPROST 0.04 MG/ML
1 SOLUTION/ DROPS OPHTHALMIC
Qty: 0 | Refills: 0 | DISCHARGE

## 2023-11-01 RX ORDER — CEFTRIAXONE 500 MG/1
2000 INJECTION, POWDER, FOR SOLUTION INTRAMUSCULAR; INTRAVENOUS EVERY 24 HOURS
Refills: 0 | Status: DISCONTINUED | OUTPATIENT
Start: 2023-11-02 | End: 2023-11-04

## 2023-11-01 RX ORDER — LATANOPROST 0.05 MG/ML
1 SOLUTION/ DROPS OPHTHALMIC; TOPICAL AT BEDTIME
Refills: 0 | Status: DISCONTINUED | OUTPATIENT
Start: 2023-11-01 | End: 2023-11-09

## 2023-11-01 RX ORDER — DORZOLAMIDE HYDROCHLORIDE 20 MG/ML
1 SOLUTION/ DROPS OPHTHALMIC
Refills: 0 | Status: DISCONTINUED | OUTPATIENT
Start: 2023-11-01 | End: 2023-11-01

## 2023-11-01 RX ORDER — ACETAMINOPHEN 500 MG
1000 TABLET ORAL ONCE
Refills: 0 | Status: COMPLETED | OUTPATIENT
Start: 2023-11-01 | End: 2023-11-01

## 2023-11-01 RX ORDER — ACYCLOVIR SODIUM 500 MG
700 VIAL (EA) INTRAVENOUS ONCE
Refills: 0 | Status: COMPLETED | OUTPATIENT
Start: 2023-11-01 | End: 2023-11-01

## 2023-11-01 RX ORDER — DORZOLAMIDE HYDROCHLORIDE TIMOLOL MALEATE 20; 5 MG/ML; MG/ML
1 SOLUTION/ DROPS OPHTHALMIC
Refills: 0 | Status: DISCONTINUED | OUTPATIENT
Start: 2023-11-01 | End: 2023-11-09

## 2023-11-01 RX ORDER — LANOLIN ALCOHOL/MO/W.PET/CERES
3 CREAM (GRAM) TOPICAL AT BEDTIME
Refills: 0 | Status: DISCONTINUED | OUTPATIENT
Start: 2023-11-01 | End: 2023-11-09

## 2023-11-01 RX ORDER — HALOPERIDOL DECANOATE 100 MG/ML
2.5 INJECTION INTRAMUSCULAR ONCE
Refills: 0 | Status: COMPLETED | OUTPATIENT
Start: 2023-11-01 | End: 2023-11-01

## 2023-11-01 RX ORDER — SODIUM CHLORIDE 9 MG/ML
2100 INJECTION INTRAMUSCULAR; INTRAVENOUS; SUBCUTANEOUS ONCE
Refills: 0 | Status: COMPLETED | OUTPATIENT
Start: 2023-11-01 | End: 2023-11-01

## 2023-11-01 RX ORDER — VANCOMYCIN HCL 1 G
1000 VIAL (EA) INTRAVENOUS EVERY 24 HOURS
Refills: 0 | Status: DISCONTINUED | OUTPATIENT
Start: 2023-11-01 | End: 2023-11-02

## 2023-11-01 RX ORDER — VANCOMYCIN HCL 1 G
1000 VIAL (EA) INTRAVENOUS ONCE
Refills: 0 | Status: COMPLETED | OUTPATIENT
Start: 2023-11-01 | End: 2023-11-01

## 2023-11-01 RX ORDER — POTASSIUM CHLORIDE 20 MEQ
10 PACKET (EA) ORAL
Refills: 0 | Status: COMPLETED | OUTPATIENT
Start: 2023-11-01 | End: 2023-11-01

## 2023-11-01 RX ORDER — ACETAMINOPHEN 500 MG
650 TABLET ORAL EVERY 6 HOURS
Refills: 0 | Status: DISCONTINUED | OUTPATIENT
Start: 2023-11-01 | End: 2023-11-09

## 2023-11-01 RX ORDER — ONDANSETRON 8 MG/1
4 TABLET, FILM COATED ORAL EVERY 8 HOURS
Refills: 0 | Status: DISCONTINUED | OUTPATIENT
Start: 2023-11-01 | End: 2023-11-02

## 2023-11-01 RX ADMIN — Medication 100 MILLIEQUIVALENT(S): at 18:18

## 2023-11-01 RX ADMIN — HALOPERIDOL DECANOATE 2.5 MILLIGRAM(S): 100 INJECTION INTRAMUSCULAR at 12:10

## 2023-11-01 RX ADMIN — Medication 164 MILLIGRAM(S): at 20:12

## 2023-11-01 RX ADMIN — Medication 521.25 MILLIGRAM(S): at 18:18

## 2023-11-01 RX ADMIN — Medication 1 MILLIGRAM(S): at 11:00

## 2023-11-01 RX ADMIN — Medication 100 MILLIEQUIVALENT(S): at 15:26

## 2023-11-01 RX ADMIN — Medication 100 MILLIEQUIVALENT(S): at 13:53

## 2023-11-01 RX ADMIN — Medication 250 MILLIGRAM(S): at 17:37

## 2023-11-01 RX ADMIN — CEFTRIAXONE 100 MILLIGRAM(S): 500 INJECTION, POWDER, FOR SOLUTION INTRAMUSCULAR; INTRAVENOUS at 15:24

## 2023-11-01 RX ADMIN — Medication 400 MILLIGRAM(S): at 11:01

## 2023-11-01 RX ADMIN — Medication 1000 MILLIGRAM(S): at 11:30

## 2023-11-01 RX ADMIN — CEFTRIAXONE 100 MILLIGRAM(S): 500 INJECTION, POWDER, FOR SOLUTION INTRAMUSCULAR; INTRAVENOUS at 17:37

## 2023-11-01 RX ADMIN — SODIUM CHLORIDE 2100 MILLILITER(S): 9 INJECTION INTRAMUSCULAR; INTRAVENOUS; SUBCUTANEOUS at 11:00

## 2023-11-01 NOTE — ED PROVIDER NOTE - ATTENDING APP SHARED VISIT CONTRIBUTION OF CARE
This patient is a 89 year old woman hx of HTN and blindness who presents to the ER with her daughter reporting AMS x 1.5 days.  Daughters state that the patient has "been off" for the past 1.5 days.  Her baseline mental status is A&O x 3.  Since yesterday she has been confused and not speaking clearly, agitated, and restless.  Her daughter noticed a slight left facial droop today.  Patient confused oriented x 0, agitated and restless, mumbling not following commands however moving all extremities no focal weakness.  Unable to properly assess facial droop however no obvious CN palsy noticed on exam.  UA negative, CXR negative, RVP negative.  High suspicion for possible meningitis as patient is febrile with AMS much different presentation from her baseline mental status.  Lumbar puncture performed.  Abx given and patient admitted to telemetry with isolation precautions.

## 2023-11-01 NOTE — H&P ADULT - ASSESSMENT
CSF mildly elevated glucose and protein, elevated neutrophils, gram stain without organisms  F/u CSF PCR panel Christelle Ledezma is a 89 year old female with PMHx of HTN (not on any meds), glaucoma, and legal blindness who presented to the ED on 11/1/23 for complaints of altered mental status and admitted for acute metabolic encephalopathy, unclear etiology.     Acute metabolic encephalopathy, unclear etiology  Daughter reports slurred speech  NCHCT without acute intracranial finding  CXR without infiltrates  U/A without infection   S/p LP in the ED  CSF mildly elevated glucose and protein, elevated neutrophils, gram stain without organisms  CSF PCR panel negative  Avoid sedating meds, reorient PRN, sitter for safety  F/u CSF fungal culture, CSF Lyme Ab, CSF VDRL titer, CSF West Nile IgM/IgG Ab  F/u MRI brain    SIRS, unclear etiology  Tmax 101.3, , RR 24 on admission  CXR without infiltrates, U/A without infection  S/p LP in the ED  CSF results as above  S/p acyclovir, ceftriaxone, Bactrim, and vancomycin in the ED  Will empirically continue antibiotic given severity of infection  Will hold off on additional acyclovir given HSV 1/2 negative on CSF PCR panel  Will hold off on ampicillin given Listeria negative and PCN allergy  F/u blood cultures    Hypokalemia  Replete PRN    Lactic acidosis, resolved      Chronic medical conditions:  Glaucoma: PTA dorzolamide-timolol and latanoprost  HTN: supposed to be on amlodipine but does not take it  Supposed to be on keppra 250 mg BID given EEG (10/4/22) with evidence for increased risk for seizures from area of focal dysfunction in left temporal region but does not take it  Also advised by neurology in the past to take ASA 81 mg daily and atorvastatin 40 mg qhs but does not take either      Plan of care discussed with daughterSuri ([phone]) over the phone.

## 2023-11-01 NOTE — ED PROVIDER NOTE - PROGRESS NOTE DETAILS
Group home will not take patient back unless she has a covid swab and urine drug screen done IR called and states does not do LP because there is no neuro radiologist in IR department IR called and states no one there today to do LPs.  But order placed for tomorrow if no success in the ER. NATE Kimble: patient admitted, daughter Suri emergency contact [phone], updated on all results and plan for admission at this time. Daughter reporting patient had a dental infection a few months ago and might have had an allergic reaction to shrimp a few days ago but unsure.

## 2023-11-01 NOTE — H&P ADULT - HISTORY OF PRESENT ILLNESS
Christelle Ledezma is a 89 year old female with PMHx of HTN (not on any meds), glaucoma, and legal blindness who presented to the ED on 11/1/23 for complaints of altered mental status.    History obtained from daughter, Suri ([phone]) as patient is currently unable to provide history due to mental status. Daughter typically calls patient multiple times daily to speak to her. She finally talked to her around 8 AM and daughter states patient's words were slurred and she was not acting like herself. Her last conversation with patient was last night prior to bedtime around 9:35 PM. Yesterday, patient was at baseline, able to go to [employer] to  groceries. Daughter states patient is a "good 89 year old who speaks English, Serbian, and German." Denies any recent travel or sick contacts. No complaints of headache or neck pain but states patient is very stubborn and does not frequently complain of anything.     In the ED, Tmax 101.3, HR as elevated as 117, RR as elevated as 24, BP as elevated as 162/127. Labs grossly unremarkable except for potassium 3.0 and lactate 3.5. ABG 7.44 / 38 / 53 / 26. COVID/influenza negative. U/A with trace leuks, trace blood, occasional bacteria, squamous epithelial cells. NCHCT without acute intracranial finding. CXR unremarkable. LP CSF mildly elevated glucose and protein, elevated neutrophils, gram stain without organisms. Received acyclovir, ceftriaxone, Bactrim, and vancomycin.  Christelle Ledezma is a 89 year old female with PMHx of HTN (not on any meds), glaucoma, and legal blindness who presented to the ED on 11/1/23 for complaints of altered mental status.    History obtained from daughter, Suri ([phone]) as patient is currently unable to provide history due to mental status. Daughter typically calls patient multiple times daily to speak to her. She finally talked to her around 8 AM and daughter states patient's words were slurred and she was not acting like herself. Her last conversation with patient was last night prior to bedtime around 9:35 PM. Yesterday, patient was at baseline, able to go to [employer] to  groceries. Daughter states patient is a "good 89 year old who speaks English, Greenlandic, and Irish." Denies any recent travel or sick contacts. No complaints of headache or neck pain but states patient is very stubborn and does not frequently complain of anything.     In the ED, Tmax 101.3, HR as elevated as 117, RR as elevated as 24, BP as elevated as 162/127. Labs grossly unremarkable except for potassium 3.0 and lactate 3.5. ABG 7.44 / 38 / 53 / 26. COVID/influenza negative. U/A with trace leuks, trace blood, occasional bacteria, squamous epithelial cells. NCHCT without acute intracranial finding. CXR unremarkable. LP CSF mildly elevated glucose and protein, elevated neutrophils, gram stain without organisms. CSF PCR negative. Received acyclovir, ceftriaxone, Bactrim, and vancomycin, acetaminophen 1 g IV, haloperidol 2.5 mg IV, and potassium repletion.

## 2023-11-01 NOTE — ED ADULT NURSE REASSESSMENT NOTE - SYMPTOMS
confusion
OCCASIONALLY PULLING IV LINES. MELECIO SULLIVAN NOTIFIED. WILL ORDER 1:1./confusion
confusion
confusion

## 2023-11-01 NOTE — ED PROVIDER NOTE - NS ED ATTENDING STATEMENT MOD
This was a shared visit with the MARCUS. I reviewed and verified the documentation and independently performed the documented:

## 2023-11-01 NOTE — H&P ADULT - NSHPLABSRESULTS_GEN_ALL_CORE
12.2   8.37  )-----------( 219      ( 01 Nov 2023 10:50 )             34.9   11-01    137  |  105  |  13  ----------------------------<  181<H>  3.0<L>   |  21<L>  |  1.00    Ca    9.2      01 Nov 2023 10:50    TPro  8.3  /  Alb  3.5  /  TBili  0.7  /  DBili  x   /  AST  19  /  ALT  23  /  AlkPhos  84  11-01    Urinalysis (11.01.23 @ 13:30)    pH Urine: 7.5    Glucose Qualitative, Urine: Negative mg/dL    Blood, Urine: Trace    Color: Yellow    Urine Appearance: Turbid    Bilirubin: Negative    Ketone - Urine: Trace mg/dL    Specific Gravity: 1.013    Protein, Urine: Trace mg/dL    Urobilinogen: 1.0 mg/dL    Nitrite: Negative    Leukocyte Esterase Concentration: Trace    CT head 11/1/23  IMPRESSION:  Limited exam due to motion    No acute intracranial hemorrhage or acute territorial infarct.  If symptoms persist, follow-up MRI exam recommended.    Chest x-ray 11/1/23  IMPRESSION:  Prior bandlike retrocardiac atelectasis has largely resolved.

## 2023-11-01 NOTE — ED ADULT NURSE NOTE - ED STAT RN HANDOFF DETAILS
Hand off report given to Annmarie SHERMAN. Pt on contact precautions, aaox0 currently, confused. IVs intact. Hemodynamically stable. Pt respirations spontaneous and unlabored. Pt in NAD at this time

## 2023-11-01 NOTE — ED ADULT NURSE NOTE - LAST KNOWN WELL DATE/TIME
Gingivostomatitis in Children   WHAT YOU NEED TO KNOW:   What is gingivostomatitis (GS)?  GS is a condition that causes painful sores on the lips, tongue, gums, and inside the mouth  GS is caused by the herpes simplex virus  The virus spreads easily through saliva, shared toys, drink cups, or eating utensils  The mouth sores make swallowing painful, so your child may not want to eat or drink  The sores usually heal within 2 weeks  Medicines can help relieve the pain and help your child feel better  What other signs and symptoms may happen with GS? Aside from painful mouth sores, your child may have any of the following:  · Fever over 100°F (38°C)    · More drooling than usual    · Sore throat and loss of appetite    · Gums that are swollen, red, or bleeding    · Bad breath    · Headache    · Tiredness  How is GS diagnosed? Your child's healthcare provider will ask about your child's symptoms and examine him  GS is usually diagnosed based on the exam  A sample of fluid or tissue from the sores may show the cause of your child's infection  How is GS treated? · Acetaminophen  decreases pain and fever  It is available without a doctor's order  Ask how much to give your child and how often to give it  Follow directions  Acetaminophen can cause liver damage if not taken correctly  · NSAIDs , such as ibuprofen, help decrease swelling, pain, and fever  This medicine is available with or without a doctor's order  NSAIDs can cause stomach bleeding or kidney problems in certain people  If your child takes blood thinner medicine, always ask if NSAIDs are safe for him  Always read the medicine label and follow directions  Do not give these medicines to children under 10months of age without direction from your child's healthcare provider  · Numbing medicine  helps decrease pain so your child can eat or drink more easily   If your child is old enough, he may swish the liquid around his mouth and then spit it into the sink  You also can put the medicine on the mouth sores with a cotton swab  Ask your child's healthcare provider how to give numbing medicine to your child  · Antiviral medicine  helps treat a viral infection  How is GS managed? · Clean your child's teeth and tongue  Bad breath and a coated tongue are common problems with GS  Gently and carefully brush your child's teeth each day  Ask your healthcare provider about a rinse to kill germs in your child's mouth  · Give your child cool, bland foods and liquids  Encourage your child to eat and drink, even though his mouth is sore  Applesauce, gelatin, or frozen treats are good choices  Do not give your child salty or acidic foods and drinks, such as orange juice  Do not give your child hard foods, such as popcorn, chips, or pretzels  Ask your healthcare provider about nutrition drinks if your child cannot eat  · Avoid spreading the virus to others  Wash your and your child's hands often  Do not share food or drinks  Clean all toys and utensils often  You may need to keep your child home from school or day care  · Have your child rest as much as possible  Rest will help him heal   Call 911 for any of the following:   · Your child has a seizure  · Your child is weak or sleepy at all times and is hard to wake up  · Your child's breathing is rapid, and his skin feels hot or cold to the touch  When should I seek immediate care? · Your child will not eat or drink  · Your child has no tears when he cries  · You see fewer wet diapers, or your child urinates less often than usual   When should I contact my child's healthcare provider? · Your child's fever returns, even with medicine  · Your child develops an upset stomach, diarrhea, rash, or a headache after taking medicine  · You have questions or concerns about your child's condition or care  CARE AGREEMENT:   You have the right to help plan your child's care   Learn about your child's health condition and how it may be treated  Discuss treatment options with your child's caregivers to decide what care you want for your child  The above information is an  only  It is not intended as medical advice for individual conditions or treatments  Talk to your doctor, nurse or pharmacist before following any medical regimen to see if it is safe and effective for you  © 2017 [address]  Information is for End User's use only and may not be sold, redistributed or otherwise used for commercial purposes  All illustrations and images included in CareNotes® are the copyrighted property of [employer] M [employer]  [address]  31-Oct-2023 21:30

## 2023-11-01 NOTE — ED PROVIDER NOTE - NEUROLOGICAL, MLM
Alert, disoriented, no focal deficits, moving all extremities. Unable to perform full neuro exam as patient is not following commands

## 2023-11-01 NOTE — ED ADULT TRIAGE NOTE - CHIEF COMPLAINT QUOTE
89yF, BIBEMS, w/ c/o AMS this morning, As per daughter, LKN 9:30pm last night. Daughter denies medical hx. Pt legally blind.

## 2023-11-01 NOTE — H&P ADULT - NSHPPHYSICALEXAM_GEN_ALL_CORE
T(C): 36.7 (11-01-23 @ 21:32), Max: 38.5 (11-01-23 @ 10:38)  HR: 83 (11-01-23 @ 21:32) (79 - 117)  BP: 136/85 (11-01-23 @ 21:32) (108/73 - 178/142)  RR: 23 (11-01-23 @ 21:32) (14 - 24)  SpO2: 98% (11-01-23 @ 21:32) (95% - 100%)    CONSTITUTIONAL: intermittently using right hand to grab left arm  RESP: No respiratory distress, no use of accessory muscles; CTA b/l  CV: RRR  GI: Soft, NT, ND  SKIN: no rash noted  NEURO: unable to perform neurological exam

## 2023-11-01 NOTE — ED ADULT NURSE NOTE - NSFALLHARMRISKINTERV_ED_ALL_ED
Assistance OOB with selected safe patient handling equipment if applicable/Assistance with ambulation/Communicate risk of Fall with Harm to all staff, patient, and family/Monitor gait and stability/Monitor for mental status changes and reorient to person, place, and time, as needed/Provide visual cue: red socks, yellow wristband, yellow gown, etc/Reinforce activity limits and safety measures with patient and family/Toileting schedule using arm’s reach rule for commode and bathroom/Use of alarms - bed, stretcher, chair and/or video monitoring/Bed in lowest position, wheels locked, appropriate side rails in place/Call bell, personal items and telephone in reach/Instruct patient to call for assistance before getting out of bed/chair/stretcher/Non-slip footwear applied when patient is off stretcher/[place] to call system/Physically safe environment - no spills, clutter or unnecessary equipment/Purposeful Proactive Rounding/Room/bathroom lighting operational, light cord in reach

## 2023-11-01 NOTE — ED PROVIDER NOTE - OBJECTIVE STATEMENT
89-year-old female with past medical history of glaucoma, hypertension does not take daily medications presents emergency room for altered mental status.  Per daughter, last known normal was last night at 9:30 PM, states today when he got to the house patient was confused and disoriented, concern for possible slurred speech and swelling to the left side of her face with questionable droop.  States patient is normally alert and oriented x4, last time she would like this she had a urinary tract infection.  Reports 1 loose bowel movement this morning.  Denies known fever, chills, URI symptoms, chest pain, shortness of breath, difficulty breathing, abdominal pain, nausea, vomiting or urinary complaints.  Patient lives at home alone, has an aide 7 hours a day.

## 2023-11-01 NOTE — ED PROVIDER NOTE - CLINICAL SUMMARY MEDICAL DECISION MAKING FREE TEXT BOX
89-year-old female with past medical history of glaucoma, hypertension does not take daily medications presents emergency room for altered mental status, last known normal was last night at 9:30 PM,  confused and disoriented, concern for possible slurred speech and swelling to the left side of her face with questionable droop.  States patient is normally alert and oriented x4, last time she would like this she had a urinary tract infection.  Reports 1 loose bowel movement this morning.  Denies known fever, chills, URI symptoms, chest pain, shortness of breath, difficulty breathing, abdominal pain, nausea, vomiting or urinary complaints. Tachycardiac, febrile, normal O2, FS normal, EKG [id number], code sepsis, IV tylenol given, r/o UTI vs PNA, less likely CVA vs ACS. Will get labs, meds, IVF, CXR, CT head, reassess. Dispo pending results.

## 2023-11-02 LAB
ALBUMIN SERPL ELPH-MCNC: 3 G/DL — LOW (ref 3.3–5)
ALBUMIN SERPL ELPH-MCNC: 3 G/DL — LOW (ref 3.3–5)
ALP SERPL-CCNC: 70 U/L — SIGNIFICANT CHANGE UP (ref 40–120)
ALP SERPL-CCNC: 70 U/L — SIGNIFICANT CHANGE UP (ref 40–120)
ALT FLD-CCNC: 20 U/L — SIGNIFICANT CHANGE UP (ref 12–78)
ALT FLD-CCNC: 20 U/L — SIGNIFICANT CHANGE UP (ref 12–78)
ANION GAP SERPL CALC-SCNC: 5 MMOL/L — SIGNIFICANT CHANGE UP (ref 5–17)
ANION GAP SERPL CALC-SCNC: 5 MMOL/L — SIGNIFICANT CHANGE UP (ref 5–17)
AST SERPL-CCNC: 24 U/L — SIGNIFICANT CHANGE UP (ref 15–37)
AST SERPL-CCNC: 24 U/L — SIGNIFICANT CHANGE UP (ref 15–37)
BILIRUB SERPL-MCNC: 0.5 MG/DL — SIGNIFICANT CHANGE UP (ref 0.2–1.2)
BILIRUB SERPL-MCNC: 0.5 MG/DL — SIGNIFICANT CHANGE UP (ref 0.2–1.2)
BUN SERPL-MCNC: 8 MG/DL — SIGNIFICANT CHANGE UP (ref 7–23)
BUN SERPL-MCNC: 8 MG/DL — SIGNIFICANT CHANGE UP (ref 7–23)
CALCIUM SERPL-MCNC: 8.6 MG/DL — SIGNIFICANT CHANGE UP (ref 8.5–10.1)
CALCIUM SERPL-MCNC: 8.6 MG/DL — SIGNIFICANT CHANGE UP (ref 8.5–10.1)
CHLORIDE SERPL-SCNC: 109 MMOL/L — HIGH (ref 96–108)
CHLORIDE SERPL-SCNC: 109 MMOL/L — HIGH (ref 96–108)
CO2 SERPL-SCNC: 25 MMOL/L — SIGNIFICANT CHANGE UP (ref 22–31)
CO2 SERPL-SCNC: 25 MMOL/L — SIGNIFICANT CHANGE UP (ref 22–31)
CREAT SERPL-MCNC: 0.93 MG/DL — SIGNIFICANT CHANGE UP (ref 0.5–1.3)
CREAT SERPL-MCNC: 0.93 MG/DL — SIGNIFICANT CHANGE UP (ref 0.5–1.3)
CRYPTOC AG CSF-ACNC: NEGATIVE — SIGNIFICANT CHANGE UP
CRYPTOC AG CSF-ACNC: NEGATIVE — SIGNIFICANT CHANGE UP
CULTURE RESULTS: SIGNIFICANT CHANGE UP
CULTURE RESULTS: SIGNIFICANT CHANGE UP
EGFR: 58 ML/MIN/1.73M2 — LOW
EGFR: 58 ML/MIN/1.73M2 — LOW
GLUCOSE SERPL-MCNC: 146 MG/DL — HIGH (ref 70–99)
GLUCOSE SERPL-MCNC: 146 MG/DL — HIGH (ref 70–99)
HCT VFR BLD CALC: 33.9 % — LOW (ref 34.5–45)
HCT VFR BLD CALC: 33.9 % — LOW (ref 34.5–45)
HGB BLD-MCNC: 11.9 G/DL — SIGNIFICANT CHANGE UP (ref 11.5–15.5)
HGB BLD-MCNC: 11.9 G/DL — SIGNIFICANT CHANGE UP (ref 11.5–15.5)
LDH CSF L TO P-CCNC: 17 U/L — SIGNIFICANT CHANGE UP
LDH CSF L TO P-CCNC: 17 U/L — SIGNIFICANT CHANGE UP
LDH FLD-CCNC: 17 U/L — SIGNIFICANT CHANGE UP
LDH FLD-CCNC: 17 U/L — SIGNIFICANT CHANGE UP
MAGNESIUM SERPL-MCNC: 1.9 MG/DL — SIGNIFICANT CHANGE UP (ref 1.6–2.6)
MAGNESIUM SERPL-MCNC: 1.9 MG/DL — SIGNIFICANT CHANGE UP (ref 1.6–2.6)
MCHC RBC-ENTMCNC: 32.2 PG — SIGNIFICANT CHANGE UP (ref 27–34)
MCHC RBC-ENTMCNC: 32.2 PG — SIGNIFICANT CHANGE UP (ref 27–34)
MCHC RBC-ENTMCNC: 35.1 G/DL — SIGNIFICANT CHANGE UP (ref 32–36)
MCHC RBC-ENTMCNC: 35.1 G/DL — SIGNIFICANT CHANGE UP (ref 32–36)
MCV RBC AUTO: 91.6 FL — SIGNIFICANT CHANGE UP (ref 80–100)
MCV RBC AUTO: 91.6 FL — SIGNIFICANT CHANGE UP (ref 80–100)
NIGHT BLUE STAIN TISS: SIGNIFICANT CHANGE UP
NIGHT BLUE STAIN TISS: SIGNIFICANT CHANGE UP
NRBC # BLD: 0 /100 WBCS — SIGNIFICANT CHANGE UP (ref 0–0)
NRBC # BLD: 0 /100 WBCS — SIGNIFICANT CHANGE UP (ref 0–0)
PHOSPHATE SERPL-MCNC: 2.2 MG/DL — LOW (ref 2.5–4.5)
PHOSPHATE SERPL-MCNC: 2.2 MG/DL — LOW (ref 2.5–4.5)
PLATELET # BLD AUTO: 192 K/UL — SIGNIFICANT CHANGE UP (ref 150–400)
PLATELET # BLD AUTO: 192 K/UL — SIGNIFICANT CHANGE UP (ref 150–400)
POTASSIUM SERPL-MCNC: 3 MMOL/L — LOW (ref 3.5–5.3)
POTASSIUM SERPL-MCNC: 3 MMOL/L — LOW (ref 3.5–5.3)
POTASSIUM SERPL-MCNC: 3.2 MMOL/L — LOW (ref 3.5–5.3)
POTASSIUM SERPL-MCNC: 3.2 MMOL/L — LOW (ref 3.5–5.3)
POTASSIUM SERPL-SCNC: 3 MMOL/L — LOW (ref 3.5–5.3)
POTASSIUM SERPL-SCNC: 3 MMOL/L — LOW (ref 3.5–5.3)
POTASSIUM SERPL-SCNC: 3.2 MMOL/L — LOW (ref 3.5–5.3)
POTASSIUM SERPL-SCNC: 3.2 MMOL/L — LOW (ref 3.5–5.3)
PROT SERPL-MCNC: 7.5 GM/DL — SIGNIFICANT CHANGE UP (ref 6–8.3)
PROT SERPL-MCNC: 7.5 GM/DL — SIGNIFICANT CHANGE UP (ref 6–8.3)
RBC # BLD: 3.7 M/UL — LOW (ref 3.8–5.2)
RBC # BLD: 3.7 M/UL — LOW (ref 3.8–5.2)
RBC # FLD: 13.8 % — SIGNIFICANT CHANGE UP (ref 10.3–14.5)
RBC # FLD: 13.8 % — SIGNIFICANT CHANGE UP (ref 10.3–14.5)
SODIUM SERPL-SCNC: 139 MMOL/L — SIGNIFICANT CHANGE UP (ref 135–145)
SODIUM SERPL-SCNC: 139 MMOL/L — SIGNIFICANT CHANGE UP (ref 135–145)
SPECIMEN SOURCE: SIGNIFICANT CHANGE UP
WBC # BLD: 7.65 K/UL — SIGNIFICANT CHANGE UP (ref 3.8–10.5)
WBC # BLD: 7.65 K/UL — SIGNIFICANT CHANGE UP (ref 3.8–10.5)
WBC # FLD AUTO: 7.65 K/UL — SIGNIFICANT CHANGE UP (ref 3.8–10.5)
WBC # FLD AUTO: 7.65 K/UL — SIGNIFICANT CHANGE UP (ref 3.8–10.5)

## 2023-11-02 PROCEDURE — 99232 SBSQ HOSP IP/OBS MODERATE 35: CPT

## 2023-11-02 RX ORDER — HEPARIN SODIUM 5000 [USP'U]/ML
5000 INJECTION INTRAVENOUS; SUBCUTANEOUS EVERY 12 HOURS
Refills: 0 | Status: DISCONTINUED | OUTPATIENT
Start: 2023-11-02 | End: 2023-11-09

## 2023-11-02 RX ORDER — METOPROLOL TARTRATE 50 MG
25 TABLET ORAL
Refills: 0 | Status: DISCONTINUED | OUTPATIENT
Start: 2023-11-02 | End: 2023-11-09

## 2023-11-02 RX ORDER — ASPIRIN/CALCIUM CARB/MAGNESIUM 324 MG
81 TABLET ORAL DAILY
Refills: 0 | Status: DISCONTINUED | OUTPATIENT
Start: 2023-11-02 | End: 2023-11-09

## 2023-11-02 RX ORDER — ASPIRIN/CALCIUM CARB/MAGNESIUM 324 MG
81 TABLET ORAL DAILY
Refills: 0 | Status: DISCONTINUED | OUTPATIENT
Start: 2023-11-02 | End: 2023-11-02

## 2023-11-02 RX ORDER — POTASSIUM CHLORIDE 20 MEQ
40 PACKET (EA) ORAL ONCE
Refills: 0 | Status: COMPLETED | OUTPATIENT
Start: 2023-11-02 | End: 2023-11-02

## 2023-11-02 RX ORDER — ATORVASTATIN CALCIUM 80 MG/1
20 TABLET, FILM COATED ORAL AT BEDTIME
Refills: 0 | Status: DISCONTINUED | OUTPATIENT
Start: 2023-11-02 | End: 2023-11-09

## 2023-11-02 RX ADMIN — ATORVASTATIN CALCIUM 20 MILLIGRAM(S): 80 TABLET, FILM COATED ORAL at 21:46

## 2023-11-02 RX ADMIN — Medication 40 MILLIEQUIVALENT(S): at 11:48

## 2023-11-02 RX ADMIN — Medication 25 MILLIGRAM(S): at 17:21

## 2023-11-02 RX ADMIN — LATANOPROST 1 DROP(S): 0.05 SOLUTION/ DROPS OPHTHALMIC; TOPICAL at 21:46

## 2023-11-02 RX ADMIN — DORZOLAMIDE HYDROCHLORIDE TIMOLOL MALEATE 1 DROP(S): 20; 5 SOLUTION/ DROPS OPHTHALMIC at 17:22

## 2023-11-02 RX ADMIN — Medication 81 MILLIGRAM(S): at 14:08

## 2023-11-02 RX ADMIN — Medication 650 MILLIGRAM(S): at 11:48

## 2023-11-02 RX ADMIN — Medication 250 MILLIGRAM(S): at 00:14

## 2023-11-02 RX ADMIN — HEPARIN SODIUM 5000 UNIT(S): 5000 INJECTION INTRAVENOUS; SUBCUTANEOUS at 17:20

## 2023-11-02 RX ADMIN — CEFTRIAXONE 100 MILLIGRAM(S): 500 INJECTION, POWDER, FOR SOLUTION INTRAMUSCULAR; INTRAVENOUS at 02:35

## 2023-11-02 RX ADMIN — DORZOLAMIDE HYDROCHLORIDE TIMOLOL MALEATE 1 DROP(S): 20; 5 SOLUTION/ DROPS OPHTHALMIC at 05:52

## 2023-11-02 NOTE — PHARMACOTHERAPY INTERVENTION NOTE - COMMENTS
Recommended to change levofloxacin to ceftriaxone for early signs of sepsis (101.3 F, RR 24,  bpm at initial presentation). Patient has allergy to penicillin (hives) and team will monitor for signs of reaction, ceftriaxone has low cross reactivity with penicillins for allergic reactions.
Recommended to hold vancomycin pending level.
Modified penicillin allergy to state that patient tolerated ceftriaxone during this admission 11/2023 with no documented reaction.   
Recommended to discontinue vancomycin as patient has no MRSA risk factors. Provider aware and will re-assess after antibiotics to add on if needed.

## 2023-11-02 NOTE — PROGRESS NOTE ADULT - ASSESSMENT
Christelle Ledezma is a 89 year old female with PMHx of HTN (not on any meds), glaucoma, and legal blindness who presented to the ED on 11/1/23 for complaints of altered mental status and admitted for acute metabolic encephalopathy, unclear etiology.     Acute metabolic encephalopathy, unclear etiology  Daughter reports slurred speech  NCHCT without acute intracranial finding  CXR without infiltrates  U/A without infection   S/p LP in the ED  CSF mildly elevated glucose and protein, elevated neutrophils, gram stain without organisms  CSF PCR panel negative  Avoid sedating meds, reorient PRN, sitter for safety  F/u CSF fungal culture, CSF Lyme Ab, CSF VDRL titer, CSF West Nile IgM/IgG Ab  F/u MRI brain    SIRS, unclear etiology  Tmax 101.3, , RR 24 on admission  CXR without infiltrates, U/A without infection  S/p LP in the ED  CSF results as above  S/p acyclovir, ceftriaxone, Bactrim, and vancomycin in the ED  Will empirically continue antibiotic given severity of infection  Will hold off on additional acyclovir given HSV 1/2 negative on CSF PCR panel  Will hold off on ampicillin given Listeria negative and PCN allergy  F/u blood cultures    Hypokalemia  Replete PRN    Lactic acidosis, resolved      Chronic medical conditions:  Glaucoma: PTA dorzolamide-timolol and latanoprost  HTN: supposed to be on amlodipine but does not take it  Supposed to be on keppra 250 mg BID given EEG (10/4/22) with evidence for increased risk for seizures from area of focal dysfunction in left temporal region but does not take it  Also advised by neurology in the past to take ASA 81 mg daily and atorvastatin 40 mg qhs but does not take either      Plan of care discussed with daughterSuri ([phone]) over the phone. Christelle Ledezma is a 89 year old female with PMHx of HTN (not on any meds), glaucoma, and legal blindness who presented to the ED on 11/1/23 for complaints of altered mental status and admitted for acute metabolic encephalopathy, unclear etiology.       Acute metabolic encephalopathy, unclear etiology  Daughter reports slurred speech  NCHCT without acute intracranial finding  CXR without infiltrates  U/A without infection   S/p LP in the ED  CSF mildly elevated glucose and protein, no significant neutrophils, gram stain without organisms  CSF PCR panel negative  Avoid sedating meds, reorient PRN, sitter for safety  F/u CSF fungal culture, CSF Lyme Ab, CSF VDRL titer, CSF West Nile IgM/IgG Ab    F/u MRI brain, suspect TIA    SIRS, unclear etiology  Tmax 101.3, , RR 24 on admission  CXR without infiltrates, U/A without infection  S/p LP in the ED  CSF results as above  S/p acyclovir, ceftriaxone, Bactrim, and vancomycin in the ED  Will empirically continue antibiotic given severity of infection  Will hold off on additional acyclovir given HSV 1/2 negative on CSF PCR panel  Will hold off on ampicillin given Listeria negative and PCN allergy  F/u blood cultures    Hypokalemia  Replete PRN    Lactic acidosis, resolved      Chronic medical conditions:  Glaucoma: PTA dorzolamide-timolol and latanoprost  HTN: supposed to be on amlodipine but does not take it  Supposed to be on keppra 250 mg BID given EEG (10/4/22) with evidence for increased risk for seizures from area of focal dysfunction in left temporal region but does not take it  Also advised by neurology in the past to take ASA 81 mg daily and atorvastatin 40 mg qhs but does not take either    Added Lopressor 25 mg bid for HR and BP control.     Discharge home AM if afebrile.       Plan of care discussed with daughterSuri ([phone]) over the phone.

## 2023-11-02 NOTE — PATIENT PROFILE ADULT - FALL HARM RISK - HARM RISK INTERVENTIONS
Assistance with ambulation/Assistance OOB with selected safe patient handling equipment/Communicate Risk of Fall with Harm to all staff/Monitor for mental status changes/Move patient closer to nurses' station/Reinforce activity limits and safety measures with patient and family/Reorient to person, place and time as needed/Tailored Fall Risk Interventions/Toileting schedule using arm’s reach rule for commode and bathroom/Use of alarms - bed, chair and/or voice tab/Visual Cue: Yellow wristband and red socks/Bed in lowest position, wheels locked, appropriate side rails in place/Call bell, personal items and telephone in reach/Instruct patient to call for assistance before getting out of bed or chair/Non-slip footwear when patient is out of bed/[place] to call system/Physically safe environment - no spills, clutter or unnecessary equipment/Purposeful Proactive Rounding/Room/bathroom lighting operational, light cord in reach

## 2023-11-02 NOTE — PROGRESS NOTE ADULT - SUBJECTIVE AND OBJECTIVE BOX
INTERVAL HPI/OVERNIGHT EVENTS:  Pt seen and examined at bedside.     Allergies/Intolerance: Tolerated CTX 2023 (Other)  penicillin (Hives)      MEDICATIONS  (STANDING):  cefTRIAXone   IVPB 2000 milliGRAM(s) IV Intermittent every 24 hours  dorzolamide 2%/timolol 0.5% Ophthalmic Solution 1 Drop(s) Both EYES two times a day  latanoprost 0.005% Ophthalmic Solution 1 Drop(s) Both EYES at bedtime  potassium chloride   Powder 40 milliEquivalent(s) Oral once  vancomycin  IVPB 1000 milliGRAM(s) IV Intermittent every 24 hours    MEDICATIONS  (PRN):  acetaminophen     Tablet .. 650 milliGRAM(s) Oral every 6 hours PRN Temp greater or equal to 38C (100.4F), Mild Pain (1 - 3)  aluminum hydroxide/magnesium hydroxide/simethicone Suspension 30 milliLiter(s) Oral every 4 hours PRN Dyspepsia  melatonin 3 milliGRAM(s) Oral at bedtime PRN Insomnia  ondansetron Injectable 4 milliGRAM(s) IV Push every 8 hours PRN Nausea and/or Vomiting        ROS: all systems reviewed and wnl      PHYSICAL EXAMINATION:  Vital Signs Last 24 Hrs  T(C): 36.8 (2023 05:37), Max: 38.5 (2023 10:38)  T(F): 98.3 (2023 05:37), Max: 101.3 (2023 10:38)  HR: 84 (2023 05:37) (79 - 117)  BP: 150/81 (2023 05:37) (108/73 - 178/142)  BP(mean): 102 (2023 18:28) (90 - 111)  RR: 18 (2023 05:37) (14 - 24)  SpO2: 96% (2023 05:37) (95% - 100%)    Parameters below as of 2023 05:37  Patient On (Oxygen Delivery Method): room air      CAPILLARY BLOOD GLUCOSE      POCT Blood Glucose.: 144 mg/dL (2023 10:25)        GENERAL: stable, no fevers or SOB  NECK: supple, No JVD  CHEST/LUNG: clear to auscultation bilaterally; no rales, rhonchi, or wheezing b/l  HEART: normal S1, S2  ABDOMEN: BS+, soft, ND, NT   EXTREMITIES:  pulses palpable; no clubbing, cyanosis, or edema b/l LEs    LABS:                        12.2   8.37  )-----------( 219      ( 2023 10:50 )             34.9     11-02    x   |  x   |  x   ----------------------------<  x   3.0<L>   |  x   |  x     Ca    9.2      2023 10:50    TPro  8.3  /  Alb  3.5  /  TBili  0.7  /  DBili  x   /  AST  19  /  ALT  23  /  AlkPhos  84  11-01    PT/INR - ( 2023 10:50 )   PT: 12.2 sec;   INR: 1.02 ratio         PTT - ( 2023 10:50 )  PTT:24.1 sec  Urinalysis Basic - ( 2023 13:30 )    Color: Yellow / Appearance: Turbid / S.013 / pH: x  Gluc: x / Ketone: Trace mg/dL  / Bili: Negative / Urobili: 1.0 mg/dL   Blood: x / Protein: Trace mg/dL / Nitrite: Negative   Leuk Esterase: Trace / RBC: 0-2 /HPF / WBC 0-2 /HPF   Sq Epi: x / Non Sq Epi: x / Bacteria: Occasional /HPF             INTERVAL HPI/OVERNIGHT EVENTS:  Pt seen and examined at bedside.     Allergies/Intolerance: Tolerated CTX 2023 (Other)  penicillin (Hives)      MEDICATIONS  (STANDING):  cefTRIAXone   IVPB 2000 milliGRAM(s) IV Intermittent every 24 hours  dorzolamide 2%/timolol 0.5% Ophthalmic Solution 1 Drop(s) Both EYES two times a day  latanoprost 0.005% Ophthalmic Solution 1 Drop(s) Both EYES at bedtime  potassium chloride   Powder 40 milliEquivalent(s) Oral once  vancomycin  IVPB 1000 milliGRAM(s) IV Intermittent every 24 hours    MEDICATIONS  (PRN):  acetaminophen     Tablet .. 650 milliGRAM(s) Oral every 6 hours PRN Temp greater or equal to 38C (100.4F), Mild Pain (1 - 3)  aluminum hydroxide/magnesium hydroxide/simethicone Suspension 30 milliLiter(s) Oral every 4 hours PRN Dyspepsia  melatonin 3 milliGRAM(s) Oral at bedtime PRN Insomnia  ondansetron Injectable 4 milliGRAM(s) IV Push every 8 hours PRN Nausea and/or Vomiting        ROS: all systems reviewed and wnl      PHYSICAL EXAMINATION:  Vital Signs Last 24 Hrs  T(C): 36.8 (2023 05:37), Max: 38.5 (2023 10:38)  T(F): 98.3 (2023 05:37), Max: 101.3 (2023 10:38)  HR: 84 (2023 05:37) (79 - 117)  BP: 150/81 (2023 05:37) (108/73 - 178/142)  BP(mean): 102 (2023 18:28) (90 - 111)  RR: 18 (2023 05:37) (14 - 24)  SpO2: 96% (2023 05:37) (95% - 100%)    Parameters below as of 2023 05:37  Patient On (Oxygen Delivery Method): room air      CAPILLARY BLOOD GLUCOSE      POCT Blood Glucose.: 144 mg/dL (2023 10:25)        GENERAL: stable, no fevers or SOB, in bed, alert  NECK: supple, No JVD  CHEST/LUNG: clear to auscultation bilaterally; no rales, rhonchi, or wheezing b/l  HEART: normal S1, S2  ABDOMEN: BS+, soft, ND, NT   EXTREMITIES:  pulses palpable; no clubbing, cyanosis, or edema b/l LEs    LABS:                        12.2   8.37  )-----------( 219      ( 2023 10:50 )             34.9     11-02    x   |  x   |  x   ----------------------------<  x   3.0<L>   |  x   |  x     Ca    9.2      2023 10:50    TPro  8.3  /  Alb  3.5  /  TBili  0.7  /  DBili  x   /  AST  19  /  ALT  23  /  AlkPhos  84  11-01    PT/INR - ( 2023 10:50 )   PT: 12.2 sec;   INR: 1.02 ratio         PTT - ( 2023 10:50 )  PTT:24.1 sec  Urinalysis Basic - ( 2023 13:30 )    Color: Yellow / Appearance: Turbid / S.013 / pH: x  Gluc: x / Ketone: Trace mg/dL  / Bili: Negative / Urobili: 1.0 mg/dL   Blood: x / Protein: Trace mg/dL / Nitrite: Negative   Leuk Esterase: Trace / RBC: 0-2 /HPF / WBC 0-2 /HPF   Sq Epi: x / Non Sq Epi: x / Bacteria: Occasional /HPF

## 2023-11-02 NOTE — PATIENT PROFILE ADULT - NSPRESCRUSEDDRG_GEN_A_NUR
Products Recommended: Blue lizard and cerave General Sunscreen Counseling: I recommended a broad spectrum sunscreen with a SPF of 30 or higher.  I explained that SPF 30 sunscreens block approximately 97 percent of the sun's harmful rays.  Sunscreens should be applied at least 15 minutes prior to expected sun exposure and then every 2 hours after that as long as sun exposure continues. If swimming or exercising sunscreen should be reapplied every 45 minutes to an hour after getting wet or sweating.  One ounce, or the equivalent of a shot glass full of sunscreen, is adequate to protect the skin not covered by a bathing suit. I also recommended a lip balm with a sunscreen as well. Sun protective clothing can be used in lieu of sunscreen but must be worn the entire time you are exposed to the sun's rays. Detail Level: Detailed No

## 2023-11-02 NOTE — PHARMACOTHERAPY INTERVENTION NOTE - NSPHARMCOMMASP
ASP - Therapy recommended/ Alternative therapy
ASP - DC Therapy - no infection
ASP - Dose optimization/Non-Renal dose adjustment

## 2023-11-03 LAB
ALBUMIN SERPL ELPH-MCNC: 2.9 G/DL — LOW (ref 3.3–5)
ALBUMIN SERPL ELPH-MCNC: 2.9 G/DL — LOW (ref 3.3–5)
ALP SERPL-CCNC: 69 U/L — SIGNIFICANT CHANGE UP (ref 40–120)
ALP SERPL-CCNC: 69 U/L — SIGNIFICANT CHANGE UP (ref 40–120)
ALT FLD-CCNC: 22 U/L — SIGNIFICANT CHANGE UP (ref 12–78)
ALT FLD-CCNC: 22 U/L — SIGNIFICANT CHANGE UP (ref 12–78)
ANION GAP SERPL CALC-SCNC: 6 MMOL/L — SIGNIFICANT CHANGE UP (ref 5–17)
ANION GAP SERPL CALC-SCNC: 6 MMOL/L — SIGNIFICANT CHANGE UP (ref 5–17)
AST SERPL-CCNC: 19 U/L — SIGNIFICANT CHANGE UP (ref 15–37)
AST SERPL-CCNC: 19 U/L — SIGNIFICANT CHANGE UP (ref 15–37)
BILIRUB SERPL-MCNC: 0.5 MG/DL — SIGNIFICANT CHANGE UP (ref 0.2–1.2)
BILIRUB SERPL-MCNC: 0.5 MG/DL — SIGNIFICANT CHANGE UP (ref 0.2–1.2)
BUN SERPL-MCNC: 11 MG/DL — SIGNIFICANT CHANGE UP (ref 7–23)
BUN SERPL-MCNC: 11 MG/DL — SIGNIFICANT CHANGE UP (ref 7–23)
CALCIUM SERPL-MCNC: 8.5 MG/DL — SIGNIFICANT CHANGE UP (ref 8.5–10.1)
CALCIUM SERPL-MCNC: 8.5 MG/DL — SIGNIFICANT CHANGE UP (ref 8.5–10.1)
CHLORIDE SERPL-SCNC: 105 MMOL/L — SIGNIFICANT CHANGE UP (ref 96–108)
CHLORIDE SERPL-SCNC: 105 MMOL/L — SIGNIFICANT CHANGE UP (ref 96–108)
CO2 SERPL-SCNC: 24 MMOL/L — SIGNIFICANT CHANGE UP (ref 22–31)
CO2 SERPL-SCNC: 24 MMOL/L — SIGNIFICANT CHANGE UP (ref 22–31)
CREAT SERPL-MCNC: 0.81 MG/DL — SIGNIFICANT CHANGE UP (ref 0.5–1.3)
CREAT SERPL-MCNC: 0.81 MG/DL — SIGNIFICANT CHANGE UP (ref 0.5–1.3)
EBV PCR: SIGNIFICANT CHANGE UP IU/ML
EBV PCR: SIGNIFICANT CHANGE UP IU/ML
EGFR: 68 ML/MIN/1.73M2 — SIGNIFICANT CHANGE UP
EGFR: 68 ML/MIN/1.73M2 — SIGNIFICANT CHANGE UP
GLUCOSE SERPL-MCNC: 130 MG/DL — HIGH (ref 70–99)
GLUCOSE SERPL-MCNC: 130 MG/DL — HIGH (ref 70–99)
HCT VFR BLD CALC: 34 % — LOW (ref 34.5–45)
HCT VFR BLD CALC: 34 % — LOW (ref 34.5–45)
HGB BLD-MCNC: 11.8 G/DL — SIGNIFICANT CHANGE UP (ref 11.5–15.5)
HGB BLD-MCNC: 11.8 G/DL — SIGNIFICANT CHANGE UP (ref 11.5–15.5)
MCHC RBC-ENTMCNC: 31.3 PG — SIGNIFICANT CHANGE UP (ref 27–34)
MCHC RBC-ENTMCNC: 31.3 PG — SIGNIFICANT CHANGE UP (ref 27–34)
MCHC RBC-ENTMCNC: 34.7 G/DL — SIGNIFICANT CHANGE UP (ref 32–36)
MCHC RBC-ENTMCNC: 34.7 G/DL — SIGNIFICANT CHANGE UP (ref 32–36)
MCV RBC AUTO: 90.2 FL — SIGNIFICANT CHANGE UP (ref 80–100)
MCV RBC AUTO: 90.2 FL — SIGNIFICANT CHANGE UP (ref 80–100)
NRBC # BLD: 0 /100 WBCS — SIGNIFICANT CHANGE UP (ref 0–0)
NRBC # BLD: 0 /100 WBCS — SIGNIFICANT CHANGE UP (ref 0–0)
PLATELET # BLD AUTO: 217 K/UL — SIGNIFICANT CHANGE UP (ref 150–400)
PLATELET # BLD AUTO: 217 K/UL — SIGNIFICANT CHANGE UP (ref 150–400)
POTASSIUM SERPL-MCNC: 3.4 MMOL/L — LOW (ref 3.5–5.3)
POTASSIUM SERPL-MCNC: 3.4 MMOL/L — LOW (ref 3.5–5.3)
POTASSIUM SERPL-SCNC: 3.4 MMOL/L — LOW (ref 3.5–5.3)
POTASSIUM SERPL-SCNC: 3.4 MMOL/L — LOW (ref 3.5–5.3)
PROT SERPL-MCNC: 7.3 GM/DL — SIGNIFICANT CHANGE UP (ref 6–8.3)
PROT SERPL-MCNC: 7.3 GM/DL — SIGNIFICANT CHANGE UP (ref 6–8.3)
RBC # BLD: 3.77 M/UL — LOW (ref 3.8–5.2)
RBC # BLD: 3.77 M/UL — LOW (ref 3.8–5.2)
RBC # FLD: 13.8 % — SIGNIFICANT CHANGE UP (ref 10.3–14.5)
RBC # FLD: 13.8 % — SIGNIFICANT CHANGE UP (ref 10.3–14.5)
SODIUM SERPL-SCNC: 135 MMOL/L — SIGNIFICANT CHANGE UP (ref 135–145)
SODIUM SERPL-SCNC: 135 MMOL/L — SIGNIFICANT CHANGE UP (ref 135–145)
WBC # BLD: 5.89 K/UL — SIGNIFICANT CHANGE UP (ref 3.8–10.5)
WBC # BLD: 5.89 K/UL — SIGNIFICANT CHANGE UP (ref 3.8–10.5)
WBC # FLD AUTO: 5.89 K/UL — SIGNIFICANT CHANGE UP (ref 3.8–10.5)
WBC # FLD AUTO: 5.89 K/UL — SIGNIFICANT CHANGE UP (ref 3.8–10.5)

## 2023-11-03 PROCEDURE — 70551 MRI BRAIN STEM W/O DYE: CPT | Mod: 26

## 2023-11-03 PROCEDURE — 99232 SBSQ HOSP IP/OBS MODERATE 35: CPT

## 2023-11-03 RX ORDER — POTASSIUM CHLORIDE 20 MEQ
40 PACKET (EA) ORAL ONCE
Refills: 0 | Status: COMPLETED | OUTPATIENT
Start: 2023-11-03 | End: 2023-11-03

## 2023-11-03 RX ORDER — AMLODIPINE BESYLATE 2.5 MG/1
10 TABLET ORAL DAILY
Refills: 0 | Status: DISCONTINUED | OUTPATIENT
Start: 2023-11-03 | End: 2023-11-09

## 2023-11-03 RX ORDER — POTASSIUM CHLORIDE 20 MEQ
40 PACKET (EA) ORAL EVERY 4 HOURS
Refills: 0 | Status: DISCONTINUED | OUTPATIENT
Start: 2023-11-03 | End: 2023-11-03

## 2023-11-03 RX ADMIN — ATORVASTATIN CALCIUM 20 MILLIGRAM(S): 80 TABLET, FILM COATED ORAL at 23:03

## 2023-11-03 RX ADMIN — LATANOPROST 1 DROP(S): 0.05 SOLUTION/ DROPS OPHTHALMIC; TOPICAL at 23:03

## 2023-11-03 RX ADMIN — DORZOLAMIDE HYDROCHLORIDE TIMOLOL MALEATE 1 DROP(S): 20; 5 SOLUTION/ DROPS OPHTHALMIC at 05:40

## 2023-11-03 RX ADMIN — HEPARIN SODIUM 5000 UNIT(S): 5000 INJECTION INTRAVENOUS; SUBCUTANEOUS at 18:06

## 2023-11-03 RX ADMIN — Medication 0.2 MILLIGRAM(S): at 11:55

## 2023-11-03 RX ADMIN — Medication 81 MILLIGRAM(S): at 10:57

## 2023-11-03 RX ADMIN — Medication 3 MILLIGRAM(S): at 23:08

## 2023-11-03 RX ADMIN — Medication 25 MILLIGRAM(S): at 05:41

## 2023-11-03 RX ADMIN — Medication 40 MILLIEQUIVALENT(S): at 10:57

## 2023-11-03 RX ADMIN — HEPARIN SODIUM 5000 UNIT(S): 5000 INJECTION INTRAVENOUS; SUBCUTANEOUS at 05:40

## 2023-11-03 RX ADMIN — CEFTRIAXONE 100 MILLIGRAM(S): 500 INJECTION, POWDER, FOR SOLUTION INTRAMUSCULAR; INTRAVENOUS at 02:00

## 2023-11-03 RX ADMIN — Medication 25 MILLIGRAM(S): at 18:06

## 2023-11-03 RX ADMIN — Medication 40 MILLIEQUIVALENT(S): at 16:38

## 2023-11-03 RX ADMIN — AMLODIPINE BESYLATE 10 MILLIGRAM(S): 2.5 TABLET ORAL at 10:57

## 2023-11-03 NOTE — PROGRESS NOTE ADULT - SUBJECTIVE AND OBJECTIVE BOX
INTERVAL HPI/OVERNIGHT EVENTS:  Pt seen and examined at bedside.     Allergies/Intolerance: Tolerated CTX 11/2023 (Other)  penicillin (Hives)      MEDICATIONS  (STANDING):  aspirin  chewable 81 milliGRAM(s) Oral daily  atorvastatin 20 milliGRAM(s) Oral at bedtime  cefTRIAXone   IVPB 2000 milliGRAM(s) IV Intermittent every 24 hours  dorzolamide 2%/timolol 0.5% Ophthalmic Solution 1 Drop(s) Both EYES two times a day  heparin   Injectable 5000 Unit(s) SubCutaneous every 12 hours  latanoprost 0.005% Ophthalmic Solution 1 Drop(s) Both EYES at bedtime  metoprolol tartrate 25 milliGRAM(s) Oral two times a day  potassium chloride   Powder 40 milliEquivalent(s) Oral once    MEDICATIONS  (PRN):  acetaminophen     Tablet .. 650 milliGRAM(s) Oral every 6 hours PRN Temp greater or equal to 38C (100.4F), Mild Pain (1 - 3)  melatonin 3 milliGRAM(s) Oral at bedtime PRN Insomnia        ROS: all systems reviewed and wnl      PHYSICAL EXAMINATION:  Vital Signs Last 24 Hrs  T(C): 36.4 (02 Nov 2023 23:46), Max: 38.2 (02 Nov 2023 10:50)  T(F): 97.5 (02 Nov 2023 23:46), Max: 100.7 (02 Nov 2023 10:50)  HR: 75 (02 Nov 2023 23:46) (75 - 91)  BP: 146/77 (02 Nov 2023 23:46) (146/77 - 162/95)  BP(mean): --  RR: 18 (02 Nov 2023 23:46) (18 - 18)  SpO2: 98% (02 Nov 2023 23:46) (95% - 98%)      CAPILLARY BLOOD GLUCOSE          11-02 @ 07:01  -  11-03 @ 07:00  --------------------------------------------------------  IN: 240 mL / OUT: 0 mL / NET: 240 mL    11-03 @ 07:01  -  11-03 @ 09:11  --------------------------------------------------------  IN: 0 mL / OUT: 600 mL / NET: -600 mL        GENERAL: stable on RA, no fevers.   NECK: supple, No JVD  CHEST/LUNG: clear to auscultation bilaterally; no rales, rhonchi, or wheezing b/l  HEART: normal S1, S2  ABDOMEN: BS+, soft, ND, NT   EXTREMITIES:  pulses palpable; no clubbing, cyanosis, or edema b/l LEs  SKIN: no rashes or lesions      LABS:                        11.8   5.89  )-----------( 217      ( 03 Nov 2023 07:35 )             34.0     11-03    135  |  105  |  11  ----------------------------<  130<H>  3.4<L>   |  24  |  0.81    Ca    8.5      03 Nov 2023 07:35  Phos  2.2     11-02  Mg     1.9     11-02    TPro  7.3  /  Alb  2.9<L>  /  TBili  0.5  /  DBili  x   /  AST  19  /  ALT  22  /  AlkPhos  69  11-03    PT/INR - ( 01 Nov 2023 10:50 )   PT: 12.2 sec;   INR: 1.02 ratio         PTT - ( 01 Nov 2023 10:50 )  PTT:24.1 sec  Urinalysis Basic - ( 03 Nov 2023 07:35 )    Color: x / Appearance: x / SG: x / pH: x  Gluc: 130 mg/dL / Ketone: x  / Bili: x / Urobili: x   Blood: x / Protein: x / Nitrite: x   Leuk Esterase: x / RBC: x / WBC x   Sq Epi: x / Non Sq Epi: x / Bacteria: x

## 2023-11-03 NOTE — PROGRESS NOTE ADULT - ASSESSMENT
Christelle Ledezma is a 89 year old female with PMHx of HTN (not on any meds), glaucoma, and legal blindness who presented to the ED on 11/1/23 for complaints of altered mental status and admitted for acute metabolic encephalopathy, unclear etiology.       Acute metabolic encephalopathy, unclear etiology  Daughter reports slurred speech  NCHCT without acute intracranial finding  CXR without infiltrates  U/A without infection   S/p LP in the ED  CSF mildly elevated glucose and protein, no significant neutrophils, gram stain without organisms  CSF PCR panel negative  Avoid sedating meds, reorient PRN, sitter for safety  F/u CSF fungal culture, CSF Lyme Ab, CSF VDRL titer, CSF West Nile IgM/IgG Ab    F/u MRI brain, suspect TIA    SIRS, unclear etiology  Tmax 101.3, , RR 24 on admission  CXR without infiltrates, U/A without infection  S/p LP in the ED  CSF results as above  S/p acyclovir, ceftriaxone, Bactrim, and vancomycin in the ED  Will empirically continue antibiotic given severity of infection  Will hold off on additional acyclovir given HSV 1/2 negative on CSF PCR panel  Will hold off on ampicillin given Listeria negative and PCN allergy  F/u blood cultures    Hypokalemia  Replete PRN    Lactic acidosis, resolved      Chronic medical conditions:  Glaucoma: PTA dorzolamide-timolol and latanoprost  HTN: supposed to be on amlodipine but does not take it  Supposed to be on keppra 250 mg BID given EEG (10/4/22) with evidence for increased risk for seizures from area of focal dysfunction in left temporal region but does not take it  Also advised by neurology in the past to take ASA 81 mg daily and atorvastatin 40 mg qhs but does not take either    Added Lopressor 25 mg bid for HR and BP control.     Discharge home AM if afebrile.       Plan of care discussed with daughterSuri ([phone]) over the phone. Christelel Ledezma is a 89 year old female with PMHx of HTN (not on any meds), glaucoma, and legal blindness who presented to the ED on 11/1/23 for complaints of altered mental status and admitted for acute metabolic encephalopathy, unclear etiology.       Acute metabolic encephalopathy, unclear etiology  Daughter reports slurred speech  NCHCT without acute intracranial finding  CXR without infiltrates  U/A without infection   S/p LP in the ED  CSF mildly elevated glucose and protein, no significant neutrophils, gram stain without organisms  CSF PCR panel negative  Avoid sedating meds, reorient PRN, sitter for safety  F/u CSF fungal culture, CSF Lyme Ab, CSF VDRL titer, CSF West Nile IgM/IgG Ab    F/u MRI brain, suspect TIA    SIRS, unclear etiology  Tmax 101.3, , RR 24 on admission  CXR without infiltrates, U/A without infection  S/p LP in the ED  CSF results as above  S/p acyclovir, ceftriaxone, Bactrim, and vancomycin in the ED  Will empirically continue antibiotic given severity of infection  Will hold off on additional acyclovir given HSV 1/2 negative on CSF PCR panel  Will hold off on ampicillin given Listeria negative and PCN allergy  F/u blood cultures    Hypokalemia  Replete PRN    Lactic acidosis, resolved      Chronic medical conditions:  Glaucoma: PTA dorzolamide-timolol and latanoprost  HTN: supposed to be on amlodipine but does not take it  Supposed to be on keppra 250 mg BID given EEG (10/4/22) with evidence for increased risk for seizures from area of focal dysfunction in left temporal region but does not take it  Also advised by neurology in the past to take ASA 81 mg daily and atorvastatin 40 mg qhs but does not take either    Added Lopressor 25 mg bid for HR and BP control and Norvasc.      Plan of care discussed with daughterSuri ([phone]) over the phone.    Brain MRI pending, can stop Ceftriaxone if CXR cultures are negative.  Christelle Ledezma is a 89 year old female with PMHx of HTN (not on any meds), glaucoma, and legal blindness who presented to the ED on 11/1/23 for complaints of altered mental status and admitted for acute metabolic encephalopathy, unclear etiology.       Acute metabolic encephalopathy, unclear etiology  Daughter reports slurred speech  NCHCT without acute intracranial finding  CXR without infiltrates  U/A without infection   S/p LP in the ED  CSF mildly elevated glucose and protein, no significant neutrophils, gram stain without organisms  CSF PCR panel negative  Avoid sedating meds, reorient PRN, sitter for safety  F/u CSF fungal culture, CSF Lyme Ab, CSF VDRL titer, CSF West Nile IgM/IgG Ab    Doubt meningitis as CSF LP no segmented neutrophils.     F/u MRI brain, suspect TIA, pending.     SIRS, unclear etiology  Tmax 101.3, , RR 24 on admission  CXR without infiltrates, U/A without infection  S/p LP in the ED  CSF results as above  S/p acyclovir, ceftriaxone, Bactrim, and vancomycin in the ED  Will empirically continue antibiotic given severity of infection  Will hold off on additional acyclovir given HSV 1/2 negative on CSF PCR panel  Will hold off on ampicillin given Listeria negative and PCN allergy  F/u blood cultures    Hypokalemia  Replete PRN    Lactic acidosis, resolved      Chronic medical conditions:  Glaucoma: PTA dorzolamide-timolol and latanoprost  HTN: supposed to be on amlodipine but does not take it  Supposed to be on keppra 250 mg BID given EEG (10/4/22) with evidence for increased risk for seizures from area of focal dysfunction in left temporal region but does not take it  Also advised by neurology in the past to take ASA 81 mg daily and atorvastatin 40 mg qhs but does not take either    Added Lopressor 25 mg bid for HR and BP control and Norvasc.      Plan of care discussed with daughterSuri ([phone]) over the phone.    Brain MRI pending, can stop Ceftriaxone if CXR cultures are negative.

## 2023-11-03 NOTE — DIETITIAN INITIAL EVALUATION ADULT - PERTINENT LABORATORY DATA
11-03    135  |  105  |  11  ----------------------------<  130<H>  3.4<L>   |  24  |  0.81    Ca    8.5      03 Nov 2023 07:35  Phos  2.2     11-02  Mg     1.9     11-02    TPro  7.3  /  Alb  2.9<L>  /  TBili  0.5  /  DBili  x   /  AST  19  /  ALT  22  /  AlkPhos  69  11-03

## 2023-11-03 NOTE — DIETITIAN INITIAL EVALUATION ADULT - PERTINENT MEDS FT
MEDICATIONS  (STANDING):  amLODIPine   Tablet 10 milliGRAM(s) Oral daily  aspirin  chewable 81 milliGRAM(s) Oral daily  atorvastatin 20 milliGRAM(s) Oral at bedtime  cefTRIAXone   IVPB 2000 milliGRAM(s) IV Intermittent every 24 hours  cloNIDine 0.2 milliGRAM(s) Oral once  dorzolamide 2%/timolol 0.5% Ophthalmic Solution 1 Drop(s) Both EYES two times a day  heparin   Injectable 5000 Unit(s) SubCutaneous every 12 hours  latanoprost 0.005% Ophthalmic Solution 1 Drop(s) Both EYES at bedtime  metoprolol tartrate 25 milliGRAM(s) Oral two times a day    MEDICATIONS  (PRN):  acetaminophen     Tablet .. 650 milliGRAM(s) Oral every 6 hours PRN Temp greater or equal to 38C (100.4F), Mild Pain (1 - 3)  melatonin 3 milliGRAM(s) Oral at bedtime PRN Insomnia

## 2023-11-03 NOTE — DIETITIAN INITIAL EVALUATION ADULT - DIET TYPE
Ensure Plus High Protein x 2/day (provides 700 kcal, 40 g protein)/soft and bite-sized/ileostomy/supplement (specify)

## 2023-11-03 NOTE — DIETITIAN INITIAL EVALUATION ADULT - OTHER INFO
Pt seen on medical floor, , adm w/ AMS & Acute metabolic encephalopathy, unclear etiology. Per medical chart pt's daughter reported slurred speech. F/U MRI, suspect TIA. PTA pt was living alone, also had an aide along w/ family members that help cook and shop and help to feed her.  PTA she had a good appetite. Denies N/V/D/C/Chewing/Swallowing issues, No food allergies. 11/2 - s/p swallow eval, tolerating soft & Bite sized pieces. Granddaughter at bedside helping to feed patient. Pt is legally blind and needs to be fed. No c/o at this time. Accepting of a nutritional supplement for between meals.

## 2023-11-04 LAB
ANION GAP SERPL CALC-SCNC: 6 MMOL/L — SIGNIFICANT CHANGE UP (ref 5–17)
ANION GAP SERPL CALC-SCNC: 6 MMOL/L — SIGNIFICANT CHANGE UP (ref 5–17)
BUN SERPL-MCNC: 28 MG/DL — HIGH (ref 7–23)
BUN SERPL-MCNC: 28 MG/DL — HIGH (ref 7–23)
CALCIUM SERPL-MCNC: 9.4 MG/DL — SIGNIFICANT CHANGE UP (ref 8.5–10.1)
CALCIUM SERPL-MCNC: 9.4 MG/DL — SIGNIFICANT CHANGE UP (ref 8.5–10.1)
CHLORIDE SERPL-SCNC: 106 MMOL/L — SIGNIFICANT CHANGE UP (ref 96–108)
CHLORIDE SERPL-SCNC: 106 MMOL/L — SIGNIFICANT CHANGE UP (ref 96–108)
CO2 SERPL-SCNC: 23 MMOL/L — SIGNIFICANT CHANGE UP (ref 22–31)
CO2 SERPL-SCNC: 23 MMOL/L — SIGNIFICANT CHANGE UP (ref 22–31)
CREAT SERPL-MCNC: 1.53 MG/DL — HIGH (ref 0.5–1.3)
CREAT SERPL-MCNC: 1.53 MG/DL — HIGH (ref 0.5–1.3)
CULTURE RESULTS: NO GROWTH — SIGNIFICANT CHANGE UP
CULTURE RESULTS: NO GROWTH — SIGNIFICANT CHANGE UP
EGFR: 32 ML/MIN/1.73M2 — LOW
EGFR: 32 ML/MIN/1.73M2 — LOW
GLUCOSE SERPL-MCNC: 116 MG/DL — HIGH (ref 70–99)
GLUCOSE SERPL-MCNC: 116 MG/DL — HIGH (ref 70–99)
GRAM STN FLD: SIGNIFICANT CHANGE UP
GRAM STN FLD: SIGNIFICANT CHANGE UP
POTASSIUM SERPL-MCNC: 4.2 MMOL/L — SIGNIFICANT CHANGE UP (ref 3.5–5.3)
POTASSIUM SERPL-MCNC: 4.2 MMOL/L — SIGNIFICANT CHANGE UP (ref 3.5–5.3)
POTASSIUM SERPL-SCNC: 4.2 MMOL/L — SIGNIFICANT CHANGE UP (ref 3.5–5.3)
POTASSIUM SERPL-SCNC: 4.2 MMOL/L — SIGNIFICANT CHANGE UP (ref 3.5–5.3)
SODIUM SERPL-SCNC: 135 MMOL/L — SIGNIFICANT CHANGE UP (ref 135–145)
SODIUM SERPL-SCNC: 135 MMOL/L — SIGNIFICANT CHANGE UP (ref 135–145)
SPECIMEN SOURCE: SIGNIFICANT CHANGE UP
SPECIMEN SOURCE: SIGNIFICANT CHANGE UP

## 2023-11-04 PROCEDURE — 99232 SBSQ HOSP IP/OBS MODERATE 35: CPT

## 2023-11-04 RX ORDER — SODIUM CHLORIDE 9 MG/ML
1000 INJECTION INTRAMUSCULAR; INTRAVENOUS; SUBCUTANEOUS
Refills: 0 | Status: COMPLETED | OUTPATIENT
Start: 2023-11-04 | End: 2023-11-04

## 2023-11-04 RX ORDER — PETROLATUM,WHITE
1 JELLY (GRAM) TOPICAL
Refills: 0 | Status: DISCONTINUED | OUTPATIENT
Start: 2023-11-04 | End: 2023-11-09

## 2023-11-04 RX ADMIN — DORZOLAMIDE HYDROCHLORIDE TIMOLOL MALEATE 1 DROP(S): 20; 5 SOLUTION/ DROPS OPHTHALMIC at 05:42

## 2023-11-04 RX ADMIN — SODIUM CHLORIDE 100 MILLILITER(S): 9 INJECTION INTRAMUSCULAR; INTRAVENOUS; SUBCUTANEOUS at 12:36

## 2023-11-04 RX ADMIN — Medication 81 MILLIGRAM(S): at 11:35

## 2023-11-04 RX ADMIN — HEPARIN SODIUM 5000 UNIT(S): 5000 INJECTION INTRAVENOUS; SUBCUTANEOUS at 05:43

## 2023-11-04 RX ADMIN — ATORVASTATIN CALCIUM 20 MILLIGRAM(S): 80 TABLET, FILM COATED ORAL at 22:27

## 2023-11-04 RX ADMIN — HEPARIN SODIUM 5000 UNIT(S): 5000 INJECTION INTRAVENOUS; SUBCUTANEOUS at 18:55

## 2023-11-04 RX ADMIN — DORZOLAMIDE HYDROCHLORIDE TIMOLOL MALEATE 1 DROP(S): 20; 5 SOLUTION/ DROPS OPHTHALMIC at 17:57

## 2023-11-04 RX ADMIN — Medication 25 MILLIGRAM(S): at 05:44

## 2023-11-04 RX ADMIN — Medication 25 MILLIGRAM(S): at 17:54

## 2023-11-04 RX ADMIN — CEFTRIAXONE 100 MILLIGRAM(S): 500 INJECTION, POWDER, FOR SOLUTION INTRAMUSCULAR; INTRAVENOUS at 03:05

## 2023-11-04 RX ADMIN — AMLODIPINE BESYLATE 10 MILLIGRAM(S): 2.5 TABLET ORAL at 05:44

## 2023-11-04 RX ADMIN — LATANOPROST 1 DROP(S): 0.05 SOLUTION/ DROPS OPHTHALMIC; TOPICAL at 22:28

## 2023-11-04 NOTE — PHYSICAL THERAPY INITIAL EVALUATION ADULT - FOLLOWS COMMANDS/ANSWERS QUESTIONS, REHAB EVAL
manual guidance with carryover/75% of the time/able to follow multistep instructions/able to follow single-step instructions

## 2023-11-04 NOTE — PHYSICAL THERAPY INITIAL EVALUATION ADULT - PERTINENT HX OF CURRENT PROBLEM, REHAB EVAL
Christelle Ledezma is a 89 year old female with PMHx of HTN (not on any meds), glaucoma, and legal blindness who presented to the ED on 11/1/23 for complaints of altered mental status.  MRI brain with scattered ischemia but no acute infarction. Also w/ 1.7cm extra-axial probable meningioma along the medial inferior LEFT frontal region - can f/u w/neurology as outpatient.   NCHCT without acute intracranial finding  CXR without infiltrates  U/A without infection   S/p LP in the ED (-)

## 2023-11-04 NOTE — PHYSICAL THERAPY INITIAL EVALUATION ADULT - ADDITIONAL COMMENTS
pt reporting that she lives alone in a home, has HHA 5x week from 9 to 4. 1 step to enter and 1 flight to 2nd floor. Pt has a cane, does not use it. pt ambulates mostly at home, if she goes out she will go with someone.    per EMR: History obtained from daughterSuri ([phone]) as patient is currently unable to provide history due to mental status. Daughter typically calls patient multiple times daily to speak to her. She finally talked to her around 8 AM and daughter states patient's words were slurred and she was not acting like herself. Her last conversation with patient was last night prior to bedtime around 9:35 PM. Yesterday, patient was at baseline, able to go to [employer] to  groceries. Daughter states patient is a "good 89 year old who speaks English, Lithuanian, and Japanese." Denies any recent travel or sick contacts

## 2023-11-04 NOTE — PROGRESS NOTE ADULT - SUBJECTIVE AND OBJECTIVE BOX
Patient is a 89y old  Female who presents with a chief complaint of AMS; SEPSIS     (03 Nov 2023 11:32)      SUBJECTIVE / OVERNIGHT EVENTS:    No events overnight. This AM, patient without n/v/d/cp/sob.      MEDICATIONS  (STANDING):  amLODIPine   Tablet 10 milliGRAM(s) Oral daily  aspirin  chewable 81 milliGRAM(s) Oral daily  atorvastatin 20 milliGRAM(s) Oral at bedtime  dorzolamide 2%/timolol 0.5% Ophthalmic Solution 1 Drop(s) Both EYES two times a day  heparin   Injectable 5000 Unit(s) SubCutaneous every 12 hours  latanoprost 0.005% Ophthalmic Solution 1 Drop(s) Both EYES at bedtime  metoprolol tartrate 25 milliGRAM(s) Oral two times a day  sodium chloride 0.9%. 1000 milliLiter(s) (100 mL/Hr) IV Continuous <Continuous>    MEDICATIONS  (PRN):  acetaminophen     Tablet .. 650 milliGRAM(s) Oral every 6 hours PRN Temp greater or equal to 38C (100.4F), Mild Pain (1 - 3)  melatonin 3 milliGRAM(s) Oral at bedtime PRN Insomnia      PHYSICAL EXAM:  T(C): 36.6 (11-04-23 @ 10:54), Max: 36.8 (11-03-23 @ 14:21)  HR: 74 (11-04-23 @ 10:54) (68 - 89)  BP: 128/70 (11-04-23 @ 10:54) (123/82 - 158/91)  RR: 17 (11-04-23 @ 10:54) (17 - 18)  SpO2: 97% (11-04-23 @ 10:54) (96% - 100%)  I&O's Summary    03 Nov 2023 07:01  -  04 Nov 2023 07:00  --------------------------------------------------------  IN: 240 mL / OUT: 600 mL / NET: -360 mL      GENERAL: NAD, seated in bed, just finished breakfast  HEAD:  Atraumatic, Normocephalic, MMM  CHEST/LUNG: No use of accessory muscles, CTAB, breathing non-labored  COR: RR, no mrcg  ABD: Soft, ND/NT, +BS  PSYCH: AAOx person, location, thinks it's October  NEUROLOGY: CN II-XII grossly intact, moving all extremities  SKIN: No rashes or lesions  EXT: wwp, no cce    LABS:  CAPILLARY BLOOD GLUCOSE                              11.8   5.89  )-----------( 217      ( 03 Nov 2023 07:35 )             34.0     11-04    135  |  106  |  28<H>  ----------------------------<  116<H>  4.2   |  23  |  1.53<H>    Ca    9.4      04 Nov 2023 05:00    TPro  7.3  /  Alb  2.9<L>  /  TBili  0.5  /  DBili  x   /  AST  19  /  ALT  22  /  AlkPhos  69  11-03          Urinalysis Basic - ( 04 Nov 2023 05:00 )    Color: x / Appearance: x / SG: x / pH: x  Gluc: 116 mg/dL / Ketone: x  / Bili: x / Urobili: x   Blood: x / Protein: x / Nitrite: x   Leuk Esterase: x / RBC: x / WBC x   Sq Epi: x / Non Sq Epi: x / Bacteria: x        Culture - Fungal, CSF (collected 01 Nov 2023 17:00)  Source: .CSF CSF  Preliminary Report (02 Nov 2023 10:03):    Testing in progress    Culture - CSF with Gram Stain (collected 01 Nov 2023 17:00)  Source: .CSF CSF  Gram Stain (prelim) (02 Nov 2023 14:45):    polymorphonuclear leukocytes seen    No organisms seen    by cytocentrifuge  Preliminary Report (02 Nov 2023 14:45):    No growth to date.    Culture - Acid Fast - CSF (collected 01 Nov 2023 17:00)  Source: .CSF CSF    Culture - Urine (collected 01 Nov 2023 13:30)  Source: Clean Catch Clean Catch (Midstream)  Final Report (02 Nov 2023 20:31):    >=3 organisms. Probable collection contamination.        RADIOLOGY & ADDITIONAL TESTS:    Telemetry Personally Reviewed -     Imaging Personally Reviewed -     Imaging Reviewed -     Consultant(s) Notes Reviewed -       Care Discussed with Consultants/Other Providers -

## 2023-11-04 NOTE — PHYSICAL THERAPY INITIAL EVALUATION ADULT - TRANSFER SAFETY CONCERNS NOTED: SIT/STAND, REHAB EVAL
Poor eccentric control/decreased balance during turns/decreased safety awareness/decreased weight-shifting ability

## 2023-11-04 NOTE — PHYSICAL THERAPY INITIAL EVALUATION ADULT - ASSISTIVE DEVICE FOR TRANSFER: GAIT, REHAB EVAL
Notified Dr Gan of incontinent brief soiled of red/pink tinged urine x2.  Recommendation per MD; No UA needed at this time, will continue to monitor.    hand held assistance

## 2023-11-04 NOTE — PROGRESS NOTE ADULT - ASSESSMENT
Christelle Ledezma is a 89 year old female with PMHx of HTN (not on any meds), glaucoma, and legal blindness who presented to the ED on 11/1/23 for complaints of altered mental status and admitted for acute metabolic encephalopathy, unclear etiology.       Acute metabolic encephalopathy, unclear etiology  Daughter reports slurred speech  NCHCT without acute intracranial finding  CXR without infiltrates  U/A without infection   S/p LP in the ED  CSF mildly elevated glucose and protein, no significant neutrophils, gram stain without organisms  CSF PCR panel negative  CSF cultures without growth; titers are neg  BCx w/o growth  Unlikely meningitis, will dc abx and observe    MRI brain with scattered ischemia but no acute infarction. Also w/ 1.7cm extra-axial probable meningioma along the medial inferior LEFT frontal region - can f/u w/neurology as outpatient    SIRS, unclear etiology  Tmax 101.3, , RR 24 on admission  CXR without infiltrates, U/A without infection  S/p LP in the ED  CSF results as above  S/p acyclovir, ceftriaxone, Bactrim, and vancomycin in the ED  Will observe off of abx given negative culture data thus far and overall clinical improvement     ELMO - patient with elevation on sCr due to ELMO. Etiology could be nephrotoxicity from acyclovir vs pre-renal azotemia. Will trial 1L IVNS and repeat BMP in AM.    Chronic medical conditions:  Glaucoma: PTA dorzolamide-timolol and latanoprost  HTN: supposed to be on amlodipine but does not take it  Supposed to be on keppra 250 mg BID given EEG (10/4/22) with evidence for increased risk for seizures from area of focal dysfunction in left temporal region but does not take it  Also advised by neurology in the past to take ASA 81 mg daily and atorvastatin 40 mg qhs but does not take either    Added Lopressor 25 mg bid for HR and BP control and Norvasc.      Attempted to call daughter, Suri ([phone]), to provide updates but call went to voicemail on 11/4.

## 2023-11-04 NOTE — PHYSICAL THERAPY INITIAL EVALUATION ADULT - SINGLE LEG BALANCE TEST, REHAB EVAL
One Leg Stand Test (OLST): unable to perform. Functional test to assess fall risk. Both gait and stair negotiation require components of OLST. Participants unable to perform the OLST for at least 5 seconds are at increased risk for injurious fall.

## 2023-11-05 LAB
ANION GAP SERPL CALC-SCNC: 8 MMOL/L — SIGNIFICANT CHANGE UP (ref 5–17)
ANION GAP SERPL CALC-SCNC: 8 MMOL/L — SIGNIFICANT CHANGE UP (ref 5–17)
BUN SERPL-MCNC: 26 MG/DL — HIGH (ref 7–23)
BUN SERPL-MCNC: 26 MG/DL — HIGH (ref 7–23)
CALCIUM SERPL-MCNC: 9.1 MG/DL — SIGNIFICANT CHANGE UP (ref 8.5–10.1)
CALCIUM SERPL-MCNC: 9.1 MG/DL — SIGNIFICANT CHANGE UP (ref 8.5–10.1)
CHLORIDE SERPL-SCNC: 108 MMOL/L — SIGNIFICANT CHANGE UP (ref 96–108)
CHLORIDE SERPL-SCNC: 108 MMOL/L — SIGNIFICANT CHANGE UP (ref 96–108)
CO2 SERPL-SCNC: 21 MMOL/L — LOW (ref 22–31)
CO2 SERPL-SCNC: 21 MMOL/L — LOW (ref 22–31)
CREAT SERPL-MCNC: 1.01 MG/DL — SIGNIFICANT CHANGE UP (ref 0.5–1.3)
CREAT SERPL-MCNC: 1.01 MG/DL — SIGNIFICANT CHANGE UP (ref 0.5–1.3)
EGFR: 53 ML/MIN/1.73M2 — LOW
EGFR: 53 ML/MIN/1.73M2 — LOW
GLUCOSE SERPL-MCNC: 182 MG/DL — HIGH (ref 70–99)
GLUCOSE SERPL-MCNC: 182 MG/DL — HIGH (ref 70–99)
HCT VFR BLD CALC: 34.3 % — LOW (ref 34.5–45)
HCT VFR BLD CALC: 34.3 % — LOW (ref 34.5–45)
HGB BLD-MCNC: 11.7 G/DL — SIGNIFICANT CHANGE UP (ref 11.5–15.5)
HGB BLD-MCNC: 11.7 G/DL — SIGNIFICANT CHANGE UP (ref 11.5–15.5)
MCHC RBC-ENTMCNC: 31.2 PG — SIGNIFICANT CHANGE UP (ref 27–34)
MCHC RBC-ENTMCNC: 31.2 PG — SIGNIFICANT CHANGE UP (ref 27–34)
MCHC RBC-ENTMCNC: 34.1 G/DL — SIGNIFICANT CHANGE UP (ref 32–36)
MCHC RBC-ENTMCNC: 34.1 G/DL — SIGNIFICANT CHANGE UP (ref 32–36)
MCV RBC AUTO: 91.5 FL — SIGNIFICANT CHANGE UP (ref 80–100)
MCV RBC AUTO: 91.5 FL — SIGNIFICANT CHANGE UP (ref 80–100)
NRBC # BLD: 0 /100 WBCS — SIGNIFICANT CHANGE UP (ref 0–0)
NRBC # BLD: 0 /100 WBCS — SIGNIFICANT CHANGE UP (ref 0–0)
PLATELET # BLD AUTO: 262 K/UL — SIGNIFICANT CHANGE UP (ref 150–400)
PLATELET # BLD AUTO: 262 K/UL — SIGNIFICANT CHANGE UP (ref 150–400)
POTASSIUM SERPL-MCNC: 3.8 MMOL/L — SIGNIFICANT CHANGE UP (ref 3.5–5.3)
POTASSIUM SERPL-MCNC: 3.8 MMOL/L — SIGNIFICANT CHANGE UP (ref 3.5–5.3)
POTASSIUM SERPL-SCNC: 3.8 MMOL/L — SIGNIFICANT CHANGE UP (ref 3.5–5.3)
POTASSIUM SERPL-SCNC: 3.8 MMOL/L — SIGNIFICANT CHANGE UP (ref 3.5–5.3)
RBC # BLD: 3.75 M/UL — LOW (ref 3.8–5.2)
RBC # BLD: 3.75 M/UL — LOW (ref 3.8–5.2)
RBC # FLD: 13.8 % — SIGNIFICANT CHANGE UP (ref 10.3–14.5)
RBC # FLD: 13.8 % — SIGNIFICANT CHANGE UP (ref 10.3–14.5)
SODIUM SERPL-SCNC: 137 MMOL/L — SIGNIFICANT CHANGE UP (ref 135–145)
SODIUM SERPL-SCNC: 137 MMOL/L — SIGNIFICANT CHANGE UP (ref 135–145)
WBC # BLD: 4.83 K/UL — SIGNIFICANT CHANGE UP (ref 3.8–10.5)
WBC # BLD: 4.83 K/UL — SIGNIFICANT CHANGE UP (ref 3.8–10.5)
WBC # FLD AUTO: 4.83 K/UL — SIGNIFICANT CHANGE UP (ref 3.8–10.5)
WBC # FLD AUTO: 4.83 K/UL — SIGNIFICANT CHANGE UP (ref 3.8–10.5)

## 2023-11-05 PROCEDURE — 99232 SBSQ HOSP IP/OBS MODERATE 35: CPT

## 2023-11-05 RX ORDER — DIVALPROEX SODIUM 500 MG/1
250 TABLET, DELAYED RELEASE ORAL
Refills: 0 | Status: DISCONTINUED | OUTPATIENT
Start: 2023-11-05 | End: 2023-11-09

## 2023-11-05 RX ADMIN — LATANOPROST 1 DROP(S): 0.05 SOLUTION/ DROPS OPHTHALMIC; TOPICAL at 22:28

## 2023-11-05 RX ADMIN — DORZOLAMIDE HYDROCHLORIDE TIMOLOL MALEATE 1 DROP(S): 20; 5 SOLUTION/ DROPS OPHTHALMIC at 05:11

## 2023-11-05 RX ADMIN — DIVALPROEX SODIUM 250 MILLIGRAM(S): 500 TABLET, DELAYED RELEASE ORAL at 17:14

## 2023-11-05 RX ADMIN — ATORVASTATIN CALCIUM 20 MILLIGRAM(S): 80 TABLET, FILM COATED ORAL at 22:28

## 2023-11-05 RX ADMIN — DORZOLAMIDE HYDROCHLORIDE TIMOLOL MALEATE 1 DROP(S): 20; 5 SOLUTION/ DROPS OPHTHALMIC at 17:14

## 2023-11-05 RX ADMIN — Medication 25 MILLIGRAM(S): at 05:11

## 2023-11-05 RX ADMIN — AMLODIPINE BESYLATE 10 MILLIGRAM(S): 2.5 TABLET ORAL at 05:11

## 2023-11-05 RX ADMIN — Medication 3 MILLIGRAM(S): at 01:49

## 2023-11-05 RX ADMIN — HEPARIN SODIUM 5000 UNIT(S): 5000 INJECTION INTRAVENOUS; SUBCUTANEOUS at 17:14

## 2023-11-05 RX ADMIN — HEPARIN SODIUM 5000 UNIT(S): 5000 INJECTION INTRAVENOUS; SUBCUTANEOUS at 05:11

## 2023-11-05 RX ADMIN — Medication 81 MILLIGRAM(S): at 11:26

## 2023-11-05 RX ADMIN — Medication 25 MILLIGRAM(S): at 17:14

## 2023-11-05 NOTE — PROGRESS NOTE ADULT - SUBJECTIVE AND OBJECTIVE BOX
Patient is a 89y old  Female who presents with a chief complaint of AMS; SEPSIS     (03 Nov 2023 11:32)      SUBJECTIVE / OVERNIGHT EVENTS:    No events overnight. This AM, patient without n/v/d/cp/sob.      MEDICATIONS  (STANDING):  amLODIPine   Tablet 10 milliGRAM(s) Oral daily  aspirin  chewable 81 milliGRAM(s) Oral daily  atorvastatin 20 milliGRAM(s) Oral at bedtime  dorzolamide 2%/timolol 0.5% Ophthalmic Solution 1 Drop(s) Both EYES two times a day  heparin   Injectable 5000 Unit(s) SubCutaneous every 12 hours  latanoprost 0.005% Ophthalmic Solution 1 Drop(s) Both EYES at bedtime  metoprolol tartrate 25 milliGRAM(s) Oral two times a day    MEDICATIONS  (PRN):  acetaminophen     Tablet .. 650 milliGRAM(s) Oral every 6 hours PRN Temp greater or equal to 38C (100.4F), Mild Pain (1 - 3)  melatonin 3 milliGRAM(s) Oral at bedtime PRN Insomnia  petrolatum white Ointment 1 Application(s) Topical two times a day PRN dry lips/cracking      PHYSICAL EXAM:  T(C): 36.8 (11-05-23 @ 06:06), Max: 36.9 (11-04-23 @ 17:22)  HR: 69 (11-05-23 @ 06:06) (64 - 90)  BP: 117/80 (11-05-23 @ 06:06) (117/80 - 140/83)  RR: 18 (11-05-23 @ 06:06) (17 - 18)  SpO2: 98% (11-05-23 @ 06:06) (95% - 100%)  I&O's Summary    04 Nov 2023 08:01  -  05 Nov 2023 07:00  --------------------------------------------------------  IN: 400 mL / OUT: 500 mL / NET: -100 mL      GENERAL: NAD, seated in chair, pleasant  HEAD:  Atraumatic, Normocephalic, MMM  CHEST/LUNG: No use of accessory muscles, CTAB, breathing non-labored  COR: RR, no mrcg  ABD: Soft, ND/NT, +BS  PSYCH: AAOx3  NEUROLOGY: CN II-XII grossly intact, moving all extremities  SKIN: No rashes or lesions  EXT: wwp, no cce    LABS:  CAPILLARY BLOOD GLUCOSE                              11.7   4.83  )-----------( 262      ( 05 Nov 2023 06:02 )             34.3     11-05    137  |  108  |  26<H>  ----------------------------<  182<H>  3.8   |  21<L>  |  1.01    Ca    9.1      05 Nov 2023 06:02            Urinalysis Basic - ( 05 Nov 2023 06:02 )    Color: x / Appearance: x / SG: x / pH: x  Gluc: 182 mg/dL / Ketone: x  / Bili: x / Urobili: x   Blood: x / Protein: x / Nitrite: x   Leuk Esterase: x / RBC: x / WBC x   Sq Epi: x / Non Sq Epi: x / Bacteria: x          RADIOLOGY & ADDITIONAL TESTS:    Telemetry Personally Reviewed -     Imaging Personally Reviewed -     Imaging Reviewed -     Consultant(s) Notes Reviewed -       Care Discussed with Consultants/Other Providers -

## 2023-11-05 NOTE — CONSULT NOTE ADULT - SUBJECTIVE AND OBJECTIVE BOX
HPI: 89 year old woman with hx of HTN, Glaucoma, seizure disorder, and blindness presenting with confusion. Patient was slurring her words and not acting herself. She had similar episodes in 10/2022 and 3/2023. She was diagnosed with seizure disorder in 10/2022 and started on Keppra 250mg BID. She followed up with Dr. Mera (neurologist in [facility]) and refused to take medications. She lives alone and has a HHA 4-5 days per week. As per family no trouble with her memory at baseline.    PMHx: HTN, Glaucoma, seizure disorder, blind  PSHx: none  PFHx: none  Social Hx: non-smoker, social etoh, no illicit drug use  Allergies: PCN  ROS: AMS  Medications: see EMR    Vitals: Temp 98.4F       RR 18        HR 78      /80  General: NAD  CV: regular rate and rhythm  Resp: no respiratory distress  Neck: supple  Neuro Exam: AOx2. States the month is June. Follows commands. No dysarthria. No aphasia. EOM intact. No facial droop. Blind. Tongue is midline. Palate elevate symmetrically. Shoulder shrug is intact. Finger to nose and heel to shin intact. Moving all four extremities. No focal weakness. Reflexes symmetric and toes down. Gait exam deferred. Sensory intact to touch.     CT head, MRI brain, and labs reviewed

## 2023-11-05 NOTE — PROGRESS NOTE ADULT - ASSESSMENT
Christelle Ledezma is a 89 year old female with PMHx of HTN (not on any meds), glaucoma, and legal blindness who presented to the ED on 11/1/23 for complaints of altered mental status and admitted for acute metabolic encephalopathy, unclear etiology.       Acute metabolic encephalopathy, unclear etiology; daughter reports slurred speech  CTH  without acute intracranial finding  CXR without infiltrates  U/A without infection   S/p LP in the ED  CSF mildly elevated glucose and protein, no significant neutrophils, gram stain without organisms  CSF PCR panel negative  CSF cultures without growth; titers are neg  BCx w/o growth  Unlikely meningitis, dc'd abx and observe    MRI brain with scattered ischemia but no acute infarction. Also w/ 1.7cm extra-axial probable meningioma along the medial inferior LEFT frontal region     SIRS, unclear etiology  Tmax 101.3, , RR 24 on admission  CXR without infiltrates, U/A without infection  S/p LP in the ED  CSF results as above  S/p acyclovir, ceftriaxone, Bactrim, and vancomycin in the ED  Observing off of abx given negative culture data thus far and overall clinical improvement     Suspect patient may have had seizure--prior records reviewed and patient with abnormal EEG. Was started on Keppra but patient lost to follow up and refused to take Keppra due to sedating effects.    Neurology consulted.    ELMO - patient with elevation on sCr due to ELMO. Etiology could be nephrotoxicity from acyclovir vs pre-renal azotemia. Improved after 1L IVF.    Chronic medical conditions:  Glaucoma: PTA dorzolamide-timolol and latanoprost  HTN: supposed to be on amlodipine but does not take it  Supposed to be on Keppra 250 mg BID given EEG (10/4/22) with evidence for increased risk for seizures from area of focal dysfunction in left temporal region but does not take it  Also advised by neurology in the past to take ASA 81 mg daily and atorvastatin 40 mg qhs but does not take either    Added Lopressor 25 mg bid for HR and BP control and Norvasc.      Spoke w/daughter Michell via phone on 11/4.  Spoke w/daughter Suri via phone on 11/5.   Patient's family is concerned about her ability to make safe medical decisions. Patient values her independence and has been resistant to transition to [facility]. PT recommending [facility].  Will benefit from interdisciplinary family meeting once neurology input obtained.

## 2023-11-05 NOTE — CONSULT NOTE ADULT - ASSESSMENT
Probable seizure disorder  Left frontal midline meningioma  Probable metabolic encephalopathy  UTI  HTN  Glaucoma    - had side effects to Keppra. Recommend Depakote 250mg BID if patient agrees to take medication.  - location of meningioma likely source of seizure disorder  - follow up with Dr. Mera, neurologist, at [facility] once discharged  - discussed with family at bedside and over the phone  - will sign off. Please re-consult if needed.    Thank you for the consult.

## 2023-11-06 LAB
ANION GAP SERPL CALC-SCNC: 4 MMOL/L — LOW (ref 5–17)
ANION GAP SERPL CALC-SCNC: 4 MMOL/L — LOW (ref 5–17)
B BURGDOR DNA SPEC QL NAA+PROBE: NEGATIVE — SIGNIFICANT CHANGE UP
B BURGDOR DNA SPEC QL NAA+PROBE: NEGATIVE — SIGNIFICANT CHANGE UP
BASOPHILS # BLD AUTO: 0.05 K/UL — SIGNIFICANT CHANGE UP (ref 0–0.2)
BASOPHILS # BLD AUTO: 0.05 K/UL — SIGNIFICANT CHANGE UP (ref 0–0.2)
BASOPHILS NFR BLD AUTO: 1 % — SIGNIFICANT CHANGE UP (ref 0–2)
BASOPHILS NFR BLD AUTO: 1 % — SIGNIFICANT CHANGE UP (ref 0–2)
BUN SERPL-MCNC: 26 MG/DL — HIGH (ref 7–23)
BUN SERPL-MCNC: 26 MG/DL — HIGH (ref 7–23)
CALCIUM SERPL-MCNC: 9.1 MG/DL — SIGNIFICANT CHANGE UP (ref 8.5–10.1)
CALCIUM SERPL-MCNC: 9.1 MG/DL — SIGNIFICANT CHANGE UP (ref 8.5–10.1)
CHLORIDE SERPL-SCNC: 108 MMOL/L — SIGNIFICANT CHANGE UP (ref 96–108)
CHLORIDE SERPL-SCNC: 108 MMOL/L — SIGNIFICANT CHANGE UP (ref 96–108)
CO2 SERPL-SCNC: 24 MMOL/L — SIGNIFICANT CHANGE UP (ref 22–31)
CO2 SERPL-SCNC: 24 MMOL/L — SIGNIFICANT CHANGE UP (ref 22–31)
CREAT SERPL-MCNC: 0.91 MG/DL — SIGNIFICANT CHANGE UP (ref 0.5–1.3)
CREAT SERPL-MCNC: 0.91 MG/DL — SIGNIFICANT CHANGE UP (ref 0.5–1.3)
CULTURE RESULTS: SIGNIFICANT CHANGE UP
EGFR: 60 ML/MIN/1.73M2 — SIGNIFICANT CHANGE UP
EGFR: 60 ML/MIN/1.73M2 — SIGNIFICANT CHANGE UP
EOSINOPHIL # BLD AUTO: 0.31 K/UL — SIGNIFICANT CHANGE UP (ref 0–0.5)
EOSINOPHIL # BLD AUTO: 0.31 K/UL — SIGNIFICANT CHANGE UP (ref 0–0.5)
EOSINOPHIL NFR BLD AUTO: 6.5 % — HIGH (ref 0–6)
EOSINOPHIL NFR BLD AUTO: 6.5 % — HIGH (ref 0–6)
GLUCOSE SERPL-MCNC: 118 MG/DL — HIGH (ref 70–99)
GLUCOSE SERPL-MCNC: 118 MG/DL — HIGH (ref 70–99)
HCT VFR BLD CALC: 34.4 % — LOW (ref 34.5–45)
HCT VFR BLD CALC: 34.4 % — LOW (ref 34.5–45)
HGB BLD-MCNC: 11.4 G/DL — LOW (ref 11.5–15.5)
HGB BLD-MCNC: 11.4 G/DL — LOW (ref 11.5–15.5)
IMM GRANULOCYTES NFR BLD AUTO: 0.4 % — SIGNIFICANT CHANGE UP (ref 0–0.9)
IMM GRANULOCYTES NFR BLD AUTO: 0.4 % — SIGNIFICANT CHANGE UP (ref 0–0.9)
LYMPHOCYTES # BLD AUTO: 1.81 K/UL — SIGNIFICANT CHANGE UP (ref 1–3.3)
LYMPHOCYTES # BLD AUTO: 1.81 K/UL — SIGNIFICANT CHANGE UP (ref 1–3.3)
LYMPHOCYTES # BLD AUTO: 37.9 % — SIGNIFICANT CHANGE UP (ref 13–44)
LYMPHOCYTES # BLD AUTO: 37.9 % — SIGNIFICANT CHANGE UP (ref 13–44)
MCHC RBC-ENTMCNC: 30.7 PG — SIGNIFICANT CHANGE UP (ref 27–34)
MCHC RBC-ENTMCNC: 30.7 PG — SIGNIFICANT CHANGE UP (ref 27–34)
MCHC RBC-ENTMCNC: 33.1 G/DL — SIGNIFICANT CHANGE UP (ref 32–36)
MCHC RBC-ENTMCNC: 33.1 G/DL — SIGNIFICANT CHANGE UP (ref 32–36)
MCV RBC AUTO: 92.7 FL — SIGNIFICANT CHANGE UP (ref 80–100)
MCV RBC AUTO: 92.7 FL — SIGNIFICANT CHANGE UP (ref 80–100)
MONOCYTES # BLD AUTO: 0.61 K/UL — SIGNIFICANT CHANGE UP (ref 0–0.9)
MONOCYTES # BLD AUTO: 0.61 K/UL — SIGNIFICANT CHANGE UP (ref 0–0.9)
MONOCYTES NFR BLD AUTO: 12.8 % — SIGNIFICANT CHANGE UP (ref 2–14)
MONOCYTES NFR BLD AUTO: 12.8 % — SIGNIFICANT CHANGE UP (ref 2–14)
NEUTROPHILS # BLD AUTO: 1.97 K/UL — SIGNIFICANT CHANGE UP (ref 1.8–7.4)
NEUTROPHILS # BLD AUTO: 1.97 K/UL — SIGNIFICANT CHANGE UP (ref 1.8–7.4)
NEUTROPHILS NFR BLD AUTO: 41.4 % — LOW (ref 43–77)
NEUTROPHILS NFR BLD AUTO: 41.4 % — LOW (ref 43–77)
NRBC # BLD: 0 /100 WBCS — SIGNIFICANT CHANGE UP (ref 0–0)
NRBC # BLD: 0 /100 WBCS — SIGNIFICANT CHANGE UP (ref 0–0)
PLATELET # BLD AUTO: 196 K/UL — SIGNIFICANT CHANGE UP (ref 150–400)
PLATELET # BLD AUTO: 196 K/UL — SIGNIFICANT CHANGE UP (ref 150–400)
POTASSIUM SERPL-MCNC: 4.1 MMOL/L — SIGNIFICANT CHANGE UP (ref 3.5–5.3)
POTASSIUM SERPL-MCNC: 4.1 MMOL/L — SIGNIFICANT CHANGE UP (ref 3.5–5.3)
POTASSIUM SERPL-SCNC: 4.1 MMOL/L — SIGNIFICANT CHANGE UP (ref 3.5–5.3)
POTASSIUM SERPL-SCNC: 4.1 MMOL/L — SIGNIFICANT CHANGE UP (ref 3.5–5.3)
RBC # BLD: 3.71 M/UL — LOW (ref 3.8–5.2)
RBC # BLD: 3.71 M/UL — LOW (ref 3.8–5.2)
RBC # FLD: 13.9 % — SIGNIFICANT CHANGE UP (ref 10.3–14.5)
RBC # FLD: 13.9 % — SIGNIFICANT CHANGE UP (ref 10.3–14.5)
SODIUM SERPL-SCNC: 136 MMOL/L — SIGNIFICANT CHANGE UP (ref 135–145)
SODIUM SERPL-SCNC: 136 MMOL/L — SIGNIFICANT CHANGE UP (ref 135–145)
SPECIMEN SOURCE: SIGNIFICANT CHANGE UP
VDRL CSF-TITR: SIGNIFICANT CHANGE UP
VDRL CSF-TITR: SIGNIFICANT CHANGE UP
WBC # BLD: 4.77 K/UL — SIGNIFICANT CHANGE UP (ref 3.8–10.5)
WBC # BLD: 4.77 K/UL — SIGNIFICANT CHANGE UP (ref 3.8–10.5)
WBC # FLD AUTO: 4.77 K/UL — SIGNIFICANT CHANGE UP (ref 3.8–10.5)
WBC # FLD AUTO: 4.77 K/UL — SIGNIFICANT CHANGE UP (ref 3.8–10.5)

## 2023-11-06 PROCEDURE — 99232 SBSQ HOSP IP/OBS MODERATE 35: CPT

## 2023-11-06 RX ADMIN — DORZOLAMIDE HYDROCHLORIDE TIMOLOL MALEATE 1 DROP(S): 20; 5 SOLUTION/ DROPS OPHTHALMIC at 07:01

## 2023-11-06 RX ADMIN — DORZOLAMIDE HYDROCHLORIDE TIMOLOL MALEATE 1 DROP(S): 20; 5 SOLUTION/ DROPS OPHTHALMIC at 17:56

## 2023-11-06 RX ADMIN — Medication 25 MILLIGRAM(S): at 17:53

## 2023-11-06 RX ADMIN — Medication 25 MILLIGRAM(S): at 05:10

## 2023-11-06 RX ADMIN — AMLODIPINE BESYLATE 10 MILLIGRAM(S): 2.5 TABLET ORAL at 05:09

## 2023-11-06 RX ADMIN — Medication 81 MILLIGRAM(S): at 12:02

## 2023-11-06 RX ADMIN — DIVALPROEX SODIUM 250 MILLIGRAM(S): 500 TABLET, DELAYED RELEASE ORAL at 17:53

## 2023-11-06 RX ADMIN — DIVALPROEX SODIUM 250 MILLIGRAM(S): 500 TABLET, DELAYED RELEASE ORAL at 05:10

## 2023-11-06 RX ADMIN — HEPARIN SODIUM 5000 UNIT(S): 5000 INJECTION INTRAVENOUS; SUBCUTANEOUS at 17:57

## 2023-11-06 RX ADMIN — HEPARIN SODIUM 5000 UNIT(S): 5000 INJECTION INTRAVENOUS; SUBCUTANEOUS at 05:14

## 2023-11-06 RX ADMIN — LATANOPROST 1 DROP(S): 0.05 SOLUTION/ DROPS OPHTHALMIC; TOPICAL at 21:57

## 2023-11-06 RX ADMIN — ATORVASTATIN CALCIUM 20 MILLIGRAM(S): 80 TABLET, FILM COATED ORAL at 21:57

## 2023-11-06 NOTE — PROGRESS NOTE ADULT - ASSESSMENT
Christelle Ledezma is a 89 year old female with PMHx of HTN (not on any meds), glaucoma, and legal blindness who presented to the ED on 11/1/23 for complaints of altered mental status and admitted for acute metabolic encephalopathy, unclear etiology.       Acute metabolic encephalopathy, unclear etiology; daughter reports slurred speech  CTH  without acute intracranial finding  CXR without infiltrates  U/A without infection   S/p LP in the ED  CSF mildly elevated glucose and protein, no significant neutrophils, gram stain without organisms  CSF PCR panel negative  CSF cultures without growth; titers are neg  BCx w/o growth  Unlikely meningitis, dc'd abx and observe    MRI brain with scattered ischemia but no acute infarction. Also w/ 1.7cm extra-axial probable meningioma along the medial inferior LEFT frontal region     SIRS, unclear etiology  Tmax 101.3, , RR 24 on admission  CXR without infiltrates, U/A without infection  S/p LP in the ED  CSF results as above  S/p acyclovir, ceftriaxone, Bactrim, and vancomycin in the ED  Observing off of abx given negative culture data thus far and overall clinical improvement     Suspect patient may have had seizure--prior records reviewed and patient with abnormal EEG. Was started on Keppra but patient lost to follow up and refused to take Keppra due to sedating effects.    Neurology consulted- started on depakote which patient is agreeable to take. DC keppra.     ELMO - patient with elevation on sCr due to ELMO. Etiology could be nephrotoxicity from acyclovir vs pre-renal azotemia. Improved after 1L IVF.    Chronic medical conditions:  Glaucoma: PTA dorzolamide-timolol and latanoprost  HTN: supposed to be on amlodipine but does not take it  Supposed to be on Keppra 250 mg BID given EEG (10/4/22) with evidence for increased risk for seizures from area of focal dysfunction in left temporal region but does not take it  Also advised by neurology in the past to take ASA 81 mg daily and atorvastatin 40 mg qhs but does not take either    Added Lopressor 25 mg bid for HR and BP control and Norvasc.      Spoke w/daughter Michell via phone on 11/4.  Spoke w/daughter Suri via phone on 11/5.   Patient's family is concerned about her ability to make safe medical decisions. Patient values her independence and has been resistant to transition to group home. PT recommending [facility].  Will benefit from interdisciplinary family meeting once neurology input obtained.

## 2023-11-06 NOTE — PROGRESS NOTE ADULT - SUBJECTIVE AND OBJECTIVE BOX
Patient is a 89y old  Female who presents with a chief complaint of AMS; SEPSIS     (03 Nov 2023 11:32)      INTERVAL HPI/OVERNIGHT EVENTS: overnight no acute events. Sitting in chair eating in breakfast. Answering questions appropriately. Started on depakote which she is agreeable to taking.     MEDICATIONS  (STANDING):  amLODIPine   Tablet 10 milliGRAM(s) Oral daily  aspirin  chewable 81 milliGRAM(s) Oral daily  atorvastatin 20 milliGRAM(s) Oral at bedtime  divalproex  milliGRAM(s) Oral two times a day  dorzolamide 2%/timolol 0.5% Ophthalmic Solution 1 Drop(s) Both EYES two times a day  heparin   Injectable 5000 Unit(s) SubCutaneous every 12 hours  latanoprost 0.005% Ophthalmic Solution 1 Drop(s) Both EYES at bedtime  metoprolol tartrate 25 milliGRAM(s) Oral two times a day    MEDICATIONS  (PRN):  acetaminophen     Tablet .. 650 milliGRAM(s) Oral every 6 hours PRN Temp greater or equal to 38C (100.4F), Mild Pain (1 - 3)  melatonin 3 milliGRAM(s) Oral at bedtime PRN Insomnia  petrolatum white Ointment 1 Application(s) Topical two times a day PRN dry lips/cracking      Allergies    penicillin (Hives)    Intolerances    Tolerated CTX 11/2023 (Other)      REVIEW OF SYSTEMS:   10 point ROS negative, unless stated otherwise.    Vital Signs Last 24 Hrs  T(C): 37.2 (06 Nov 2023 11:28), Max: 37.2 (06 Nov 2023 11:28)  T(F): 98.9 (06 Nov 2023 11:28), Max: 98.9 (06 Nov 2023 11:28)  HR: 80 (06 Nov 2023 11:28) (74 - 80)  BP: 124/69 (06 Nov 2023 11:28) (113/71 - 144/83)  BP(mean): --  RR: 18 (06 Nov 2023 11:28) (17 - 18)  SpO2: 95% (06 Nov 2023 11:28) (95% - 99%)    Parameters below as of 06 Nov 2023 11:28  Patient On (Oxygen Delivery Method): room air        PHYSICAL EXAM:   GENERAL: NAD, thin elderly female   HEAD:  Atraumatic, Normocephalic  EYES: blind   ENMT: Moist mucous membranes  NECK: Supple, No JVD  NERVOUS SYSTEM:  Awake and alert, moves all extremities, no FND  CHEST/LUNG: CTAB  HEART: RRR, no murmurs, rubs or gallops  ABDOMEN: Soft, Nontender, Nondistended; Bowel sounds present  EXTREMITIES:  2+ Peripheral Pulses, No clubbing, cyanosis, or edema  LYMPH: No lymphadenopathy   SKIN: No rashes or lesions      LABS:                        11.4   4.77  )-----------( 196      ( 06 Nov 2023 08:11 )             34.4     11-06    136  |  108  |  26<H>  ----------------------------<  118<H>  4.1   |  24  |  0.91    Ca    9.1      06 Nov 2023 08:11        Urinalysis Basic - ( 06 Nov 2023 08:11 )    Color: x / Appearance: x / SG: x / pH: x  Gluc: 118 mg/dL / Ketone: x  / Bili: x / Urobili: x   Blood: x / Protein: x / Nitrite: x   Leuk Esterase: x / RBC: x / WBC x   Sq Epi: x / Non Sq Epi: x / Bacteria: x      CAPILLARY BLOOD GLUCOSE          RADIOLOGY & ADDITIONAL TESTS:    Imaging Personally Reviewed:  [ X] YES  [ ] NO    Consultant(s) Notes Reviewed:  [ X] YES  [ ] NO    Care Discussed with Consultants/Other Providers [X ] YES  [ ] NO

## 2023-11-07 LAB
WNV IGG CSF IA-ACNC: POSITIVE
WNV IGG CSF IA-ACNC: POSITIVE
WNV IGM CSF IA-ACNC: NEGATIVE — SIGNIFICANT CHANGE UP
WNV IGM CSF IA-ACNC: NEGATIVE — SIGNIFICANT CHANGE UP

## 2023-11-07 PROCEDURE — 99232 SBSQ HOSP IP/OBS MODERATE 35: CPT

## 2023-11-07 RX ADMIN — LATANOPROST 1 DROP(S): 0.05 SOLUTION/ DROPS OPHTHALMIC; TOPICAL at 21:43

## 2023-11-07 RX ADMIN — ATORVASTATIN CALCIUM 20 MILLIGRAM(S): 80 TABLET, FILM COATED ORAL at 21:43

## 2023-11-07 RX ADMIN — HEPARIN SODIUM 5000 UNIT(S): 5000 INJECTION INTRAVENOUS; SUBCUTANEOUS at 05:22

## 2023-11-07 RX ADMIN — Medication 25 MILLIGRAM(S): at 18:14

## 2023-11-07 RX ADMIN — Medication 81 MILLIGRAM(S): at 13:21

## 2023-11-07 RX ADMIN — DIVALPROEX SODIUM 250 MILLIGRAM(S): 500 TABLET, DELAYED RELEASE ORAL at 19:17

## 2023-11-07 RX ADMIN — DIVALPROEX SODIUM 250 MILLIGRAM(S): 500 TABLET, DELAYED RELEASE ORAL at 05:22

## 2023-11-07 RX ADMIN — DORZOLAMIDE HYDROCHLORIDE TIMOLOL MALEATE 1 DROP(S): 20; 5 SOLUTION/ DROPS OPHTHALMIC at 05:23

## 2023-11-07 RX ADMIN — Medication 25 MILLIGRAM(S): at 05:22

## 2023-11-07 RX ADMIN — AMLODIPINE BESYLATE 10 MILLIGRAM(S): 2.5 TABLET ORAL at 05:23

## 2023-11-07 RX ADMIN — HEPARIN SODIUM 5000 UNIT(S): 5000 INJECTION INTRAVENOUS; SUBCUTANEOUS at 18:15

## 2023-11-07 RX ADMIN — DORZOLAMIDE HYDROCHLORIDE TIMOLOL MALEATE 1 DROP(S): 20; 5 SOLUTION/ DROPS OPHTHALMIC at 18:14

## 2023-11-07 NOTE — PROGRESS NOTE ADULT - ASSESSMENT
Christelle Ledezma is a 89 year old female with PMHx of HTN (not on any meds), glaucoma, and legal blindness who presented to the ED on 11/1/23 for complaints of altered mental status and admitted for acute metabolic encephalopathy, unclear etiology.       Acute metabolic encephalopathy  Daughter reports slurred speech  CTH  without acute intracranial finding  MRI brain with scattered ischemia but no acute infarction. Also w/ 1.7cm extra-axial probable meningioma along the medial inferior LEFT frontal region   CXR without infiltrates  U/A without infection   S/p LP in the ED  CSF mildly elevated glucose and protein, no significant neutrophils, gram stain without organisms  CSF PCR panel negative  CSF cultures without growth; titers are neg  BCx w/o growth  Unlikely meningitis, dc'd abx and observe  Suspect patient may have had seizure--prior records reviewed and patient with abnormal EEG ) with evidence for increased risk for seizures from area of focal dysfunction in left temporal region   Was started on Keppra but patient lost to follow up and refused to take Keppra due to sedating effects. Also advised by neurology in the past to take ASA 81 mg daily and atorvastatin 40 mg qhs but does not take either  Neurology consulted- started on depakote which patient is agreeable to take. DC keppra.     SIRS, unclear etiology, resolved  Tmax 101.3, , RR 24 on admission  CXR without infiltrates, U/A without infection  S/p LP in the ED  CSF results as above  S/p acyclovir, ceftriaxone, Bactrim, and vancomycin in the ED  Observing off of abx given negative culture data thus far and overall clinical improvement     ELMO   patient with elevation on sCr due to ELMO.   Etiology could be nephrotoxicity from acyclovir vs pre-renal azotemia.   Improved after 1L IVF.    Glaucoma  PTA dorzolamide-timolol and latanoprost    HTN  supposed to be on amlodipine but does not take it  Added Lopressor 25 mg bid for HR and BP control and Norvasc.      [facility]: [facility]

## 2023-11-07 NOTE — PROGRESS NOTE ADULT - SUBJECTIVE AND OBJECTIVE BOX
Patient is a 89y old  Female who presents with a chief complaint of AMS; SEPSIS     (03 Nov 2023 11:32)      INTERVAL HPI/OVERNIGHT EVENTS: Overnight no acute events.     MEDICATIONS  (STANDING):  amLODIPine   Tablet 10 milliGRAM(s) Oral daily  aspirin  chewable 81 milliGRAM(s) Oral daily  atorvastatin 20 milliGRAM(s) Oral at bedtime  divalproex  milliGRAM(s) Oral two times a day  dorzolamide 2%/timolol 0.5% Ophthalmic Solution 1 Drop(s) Both EYES two times a day  heparin   Injectable 5000 Unit(s) SubCutaneous every 12 hours  latanoprost 0.005% Ophthalmic Solution 1 Drop(s) Both EYES at bedtime  metoprolol tartrate 25 milliGRAM(s) Oral two times a day    MEDICATIONS  (PRN):  acetaminophen     Tablet .. 650 milliGRAM(s) Oral every 6 hours PRN Temp greater or equal to 38C (100.4F), Mild Pain (1 - 3)  melatonin 3 milliGRAM(s) Oral at bedtime PRN Insomnia  petrolatum white Ointment 1 Application(s) Topical two times a day PRN dry lips/cracking      Allergies    penicillin (Hives)    Intolerances    Tolerated CTX 11/2023 (Other)      REVIEW OF SYSTEMS:  10 point ROS negative, unless stated otherwise.    Vital Signs Last 24 Hrs  T(C): 36.6 (07 Nov 2023 11:32), Max: 37.2 (07 Nov 2023 05:00)  T(F): 97.8 (07 Nov 2023 11:32), Max: 98.9 (07 Nov 2023 05:00)  HR: 72 (07 Nov 2023 11:32) (72 - 76)  BP: 114/68 (07 Nov 2023 11:32) (109/58 - 124/72)  BP(mean): --  RR: 18 (07 Nov 2023 11:32) (18 - 18)  SpO2: 99% (07 Nov 2023 11:32) (95% - 99%)    Parameters below as of 07 Nov 2023 11:32  Patient On (Oxygen Delivery Method): room air        PHYSICAL EXAM:  GENERAL: NAD, thin elderly female   HEAD:  Atraumatic, Normocephalic  EYES: blind   ENMT: Moist mucous membranes  NECK: Supple, No JVD  NERVOUS SYSTEM:  Awake and alert, moves all extremities, no FND  CHEST/LUNG: CTAB  HEART: RRR, no murmurs, rubs or gallops  ABDOMEN: Soft, Nontender, Nondistended; Bowel sounds present  EXTREMITIES:  2+ Peripheral Pulses, No clubbing, cyanosis, or edema  LYMPH: No lymphadenopathy   SKIN: No rashes or lesions    LABS:                        11.4   4.77  )-----------( 196      ( 06 Nov 2023 08:11 )             34.4     11-06    136  |  108  |  26<H>  ----------------------------<  118<H>  4.1   |  24  |  0.91    Ca    9.1      06 Nov 2023 08:11        Urinalysis Basic - ( 06 Nov 2023 08:11 )    Color: x / Appearance: x / SG: x / pH: x  Gluc: 118 mg/dL / Ketone: x  / Bili: x / Urobili: x   Blood: x / Protein: x / Nitrite: x   Leuk Esterase: x / RBC: x / WBC x   Sq Epi: x / Non Sq Epi: x / Bacteria: x      CAPILLARY BLOOD GLUCOSE          RADIOLOGY & ADDITIONAL TESTS:    Imaging Personally Reviewed:  [ X] YES  [ ] NO    Consultant(s) Notes Reviewed:  [ X] YES  [ ] NO    Care Discussed with Consultants/Other Providers [X ] YES  [ ] NO

## 2023-11-08 LAB
ANION GAP SERPL CALC-SCNC: 8 MMOL/L — SIGNIFICANT CHANGE UP (ref 5–17)
ANION GAP SERPL CALC-SCNC: 8 MMOL/L — SIGNIFICANT CHANGE UP (ref 5–17)
BUN SERPL-MCNC: 23 MG/DL — SIGNIFICANT CHANGE UP (ref 7–23)
BUN SERPL-MCNC: 23 MG/DL — SIGNIFICANT CHANGE UP (ref 7–23)
CALCIUM SERPL-MCNC: 9.1 MG/DL — SIGNIFICANT CHANGE UP (ref 8.5–10.1)
CALCIUM SERPL-MCNC: 9.1 MG/DL — SIGNIFICANT CHANGE UP (ref 8.5–10.1)
CHLORIDE SERPL-SCNC: 109 MMOL/L — HIGH (ref 96–108)
CHLORIDE SERPL-SCNC: 109 MMOL/L — HIGH (ref 96–108)
CO2 SERPL-SCNC: 24 MMOL/L — SIGNIFICANT CHANGE UP (ref 22–31)
CO2 SERPL-SCNC: 24 MMOL/L — SIGNIFICANT CHANGE UP (ref 22–31)
CREAT SERPL-MCNC: 0.94 MG/DL — SIGNIFICANT CHANGE UP (ref 0.5–1.3)
CREAT SERPL-MCNC: 0.94 MG/DL — SIGNIFICANT CHANGE UP (ref 0.5–1.3)
EGFR: 57 ML/MIN/1.73M2 — LOW
EGFR: 57 ML/MIN/1.73M2 — LOW
GLUCOSE SERPL-MCNC: 141 MG/DL — HIGH (ref 70–99)
GLUCOSE SERPL-MCNC: 141 MG/DL — HIGH (ref 70–99)
HCT VFR BLD CALC: 33.3 % — LOW (ref 34.5–45)
HCT VFR BLD CALC: 33.3 % — LOW (ref 34.5–45)
HGB BLD-MCNC: 11.4 G/DL — LOW (ref 11.5–15.5)
HGB BLD-MCNC: 11.4 G/DL — LOW (ref 11.5–15.5)
MCHC RBC-ENTMCNC: 31.4 PG — SIGNIFICANT CHANGE UP (ref 27–34)
MCHC RBC-ENTMCNC: 31.4 PG — SIGNIFICANT CHANGE UP (ref 27–34)
MCHC RBC-ENTMCNC: 34.2 G/DL — SIGNIFICANT CHANGE UP (ref 32–36)
MCHC RBC-ENTMCNC: 34.2 G/DL — SIGNIFICANT CHANGE UP (ref 32–36)
MCV RBC AUTO: 91.7 FL — SIGNIFICANT CHANGE UP (ref 80–100)
MCV RBC AUTO: 91.7 FL — SIGNIFICANT CHANGE UP (ref 80–100)
NRBC # BLD: 0 /100 WBCS — SIGNIFICANT CHANGE UP (ref 0–0)
NRBC # BLD: 0 /100 WBCS — SIGNIFICANT CHANGE UP (ref 0–0)
PLATELET # BLD AUTO: 234 K/UL — SIGNIFICANT CHANGE UP (ref 150–400)
PLATELET # BLD AUTO: 234 K/UL — SIGNIFICANT CHANGE UP (ref 150–400)
POTASSIUM SERPL-MCNC: 4 MMOL/L — SIGNIFICANT CHANGE UP (ref 3.5–5.3)
POTASSIUM SERPL-MCNC: 4 MMOL/L — SIGNIFICANT CHANGE UP (ref 3.5–5.3)
POTASSIUM SERPL-SCNC: 4 MMOL/L — SIGNIFICANT CHANGE UP (ref 3.5–5.3)
POTASSIUM SERPL-SCNC: 4 MMOL/L — SIGNIFICANT CHANGE UP (ref 3.5–5.3)
RBC # BLD: 3.63 M/UL — LOW (ref 3.8–5.2)
RBC # BLD: 3.63 M/UL — LOW (ref 3.8–5.2)
RBC # FLD: 14 % — SIGNIFICANT CHANGE UP (ref 10.3–14.5)
RBC # FLD: 14 % — SIGNIFICANT CHANGE UP (ref 10.3–14.5)
SODIUM SERPL-SCNC: 141 MMOL/L — SIGNIFICANT CHANGE UP (ref 135–145)
SODIUM SERPL-SCNC: 141 MMOL/L — SIGNIFICANT CHANGE UP (ref 135–145)
WBC # BLD: 3.78 K/UL — LOW (ref 3.8–10.5)
WBC # BLD: 3.78 K/UL — LOW (ref 3.8–10.5)
WBC # FLD AUTO: 3.78 K/UL — LOW (ref 3.8–10.5)
WBC # FLD AUTO: 3.78 K/UL — LOW (ref 3.8–10.5)

## 2023-11-08 PROCEDURE — 99232 SBSQ HOSP IP/OBS MODERATE 35: CPT

## 2023-11-08 RX ADMIN — DORZOLAMIDE HYDROCHLORIDE TIMOLOL MALEATE 1 DROP(S): 20; 5 SOLUTION/ DROPS OPHTHALMIC at 05:38

## 2023-11-08 RX ADMIN — HEPARIN SODIUM 5000 UNIT(S): 5000 INJECTION INTRAVENOUS; SUBCUTANEOUS at 05:39

## 2023-11-08 RX ADMIN — AMLODIPINE BESYLATE 10 MILLIGRAM(S): 2.5 TABLET ORAL at 05:38

## 2023-11-08 RX ADMIN — Medication 25 MILLIGRAM(S): at 17:36

## 2023-11-08 RX ADMIN — DIVALPROEX SODIUM 250 MILLIGRAM(S): 500 TABLET, DELAYED RELEASE ORAL at 17:27

## 2023-11-08 RX ADMIN — Medication 25 MILLIGRAM(S): at 05:38

## 2023-11-08 RX ADMIN — DIVALPROEX SODIUM 250 MILLIGRAM(S): 500 TABLET, DELAYED RELEASE ORAL at 05:38

## 2023-11-08 RX ADMIN — LATANOPROST 1 DROP(S): 0.05 SOLUTION/ DROPS OPHTHALMIC; TOPICAL at 22:20

## 2023-11-08 RX ADMIN — Medication 81 MILLIGRAM(S): at 11:32

## 2023-11-08 RX ADMIN — HEPARIN SODIUM 5000 UNIT(S): 5000 INJECTION INTRAVENOUS; SUBCUTANEOUS at 17:34

## 2023-11-08 RX ADMIN — ATORVASTATIN CALCIUM 20 MILLIGRAM(S): 80 TABLET, FILM COATED ORAL at 22:20

## 2023-11-08 NOTE — PROGRESS NOTE ADULT - SUBJECTIVE AND OBJECTIVE BOX
Patient is a 89y old  Female who presents with a chief complaint of AMS; SEPSIS     (03 Nov 2023 11:32)      INTERVAL HPI/OVERNIGHT EVENTS:   Patient seen and examined bedside.   no overnight events     REVIEW OF SYSTEMS: remaining ROS negative     MEDICATIONS  (STANDING):  amLODIPine   Tablet 10 milliGRAM(s) Oral daily  aspirin  chewable 81 milliGRAM(s) Oral daily  atorvastatin 20 milliGRAM(s) Oral at bedtime  divalproex  milliGRAM(s) Oral two times a day  dorzolamide 2%/timolol 0.5% Ophthalmic Solution 1 Drop(s) Both EYES two times a day  heparin   Injectable 5000 Unit(s) SubCutaneous every 12 hours  latanoprost 0.005% Ophthalmic Solution 1 Drop(s) Both EYES at bedtime  metoprolol tartrate 25 milliGRAM(s) Oral two times a day    MEDICATIONS  (PRN):  acetaminophen     Tablet .. 650 milliGRAM(s) Oral every 6 hours PRN Temp greater or equal to 38C (100.4F), Mild Pain (1 - 3)  melatonin 3 milliGRAM(s) Oral at bedtime PRN Insomnia  petrolatum white Ointment 1 Application(s) Topical two times a day PRN dry lips/cracking      Allergies    penicillin (Hives)    Intolerances    Tolerated CTX 11/2023 (Other)        Vital Signs Last 24 Hrs  T(C): 36.8 (08 Nov 2023 11:13), Max: 36.8 (07 Nov 2023 17:07)  T(F): 98.2 (08 Nov 2023 11:13), Max: 98.2 (07 Nov 2023 17:07)  HR: 89 (08 Nov 2023 12:23) (64 - 89)  BP: 124/78 (08 Nov 2023 12:23) (113/67 - 133/81)  BP(mean): --  RR: 17 (08 Nov 2023 12:23) (17 - 18)  SpO2: 97% (08 Nov 2023 12:23) (95% - 99%)    Parameters below as of 08 Nov 2023 12:23  Patient On (Oxygen Delivery Method): room air            PHYSICAL EXAM:  GENERAL: NAD, no increased WOB  HEAD:  Atraumatic, Normocephalic  ENMT: No tonsillar erythema, exudates, or enlargement; Moist mucous membranes   NECK: Supple, No JVD   NERVOUS SYSTEM:  awake and alert, moving extremities , legally blind   CHEST/LUNG: CTAB;  No rales, rhonchi, wheezing, or rubs  HEART: Regular rate and rhythm; No murmurs, rubs, or gallops  ABDOMEN: Soft, Nontender, Nondistended; Bowel sounds present  EXTREMITIES:  2+ Peripheral Pulses b/l, No clubbing, cyanosis, calf tenderness or edema b/l         LABS:                                   11.4   3.78  )-----------( 234      ( 08 Nov 2023 11:10 )             33.3   11-08    141  |  109<H>  |  23  ----------------------------<  141<H>  4.0   |  24  |  0.94    Ca    9.1      08 Nov 2023 11:10          Urinalysis Basic - ( 06 Nov 2023 08:11 )    Color: x / Appearance: x / SG: x / pH: x  Gluc: 118 mg/dL / Ketone: x  / Bili: x / Urobili: x   Blood: x / Protein: x / Nitrite: x   Leuk Esterase: x / RBC: x / WBC x   Sq Epi: x / Non Sq Epi: x / Bacteria: x      CAPILLARY BLOOD GLUCOSE          RADIOLOGY & ADDITIONAL TESTS:    Consultant(s) Notes Reveiwed [ x] Yes     Care Discussed with [x ] Consultants  [x ] Patient  [x ] Family--daughter Kamilah [phone] [ x] /   [x ] Other; RN  Care plan and all findings were discussed in detail with patient and daughter Kamilah.  All questions and concerns addressed

## 2023-11-08 NOTE — PROGRESS NOTE ADULT - ASSESSMENT
Christelle Ledezma is a 89 year old female with PMHx of HTN (not on any meds), glaucoma, and legal blindness who presented to the ED on 11/1/23 for complaints of altered mental status and admitted for acute metabolic encephalopathy, unclear etiology.       Acute metabolic encephalopathy  Daughter reports slurred speech  CTH  without acute intracranial finding  MRI brain with scattered ischemia but no acute infarction. Also w/ 1.7cm extra-axial probable meningioma along the medial inferior LEFT frontal region   CXR without infiltrates  U/A without infection   S/p LP in the ED  CSF mildly elevated glucose and protein, no significant neutrophils, gram stain without organisms  CSF PCR panel negative  CSF cultures without growth; titers are neg  BCx w/o growth  Unlikely meningitis, dc'd abx and observe  Suspect patient may have had seizure--prior records reviewed and patient with abnormal EEG ) with evidence for increased risk for seizures from area of focal dysfunction in left temporal region   Was started on Keppra but patient lost to follow up and refused to take Keppra due to sedating effects. Also advised by neurology in the past to take ASA 81 mg daily and atorvastatin 40 mg qhs but does not take either  Neurology consulted- started on depakote which patient is agreeable to take. DC keppra.     SIRS, unclear etiology, resolved  Tmax 101.3, , RR 24 on admission  CXR without infiltrates, U/A without infection  S/p LP in the ED  CSF results as above  S/p acyclovir, ceftriaxone, Bactrim, and vancomycin in the ED  Observing off of abx given negative culture data thus far and overall clinical improvement     ELMO   patient with elevation on sCr due to ELMO.   Etiology could be nephrotoxicity from acyclovir vs pre-renal azotemia.   Improved after 1L IVF.    Glaucoma  PTA dorzolamide-timolol and latanoprost    HTN  continue with Lopressor 25 mg bid for HR and BP control and Norvasc.      [facility]: [facility] , daughter and patient refused, requesting home with home PT and resumption of prior services  No

## 2023-11-08 NOTE — PROGRESS NOTE ADULT - TIME BILLING
min spent reviewing patient's chart,  examining patient, discussing plan with patient and family and staff, reviewing consultant recommendations/communicating with consultants, writing progress note and placing orders.
- Ordering, reviewing, and interpreting labs, testing, and imaging.  - Independently obtaining a review of systems and performing a physical exam  - Reviewing prior hospitalization and where necessary, outpatient records.  - Reviewing consultant recommendations/communicating with consultants  - Counselling and educating patient and family regarding interpretation of aforementioned items and plan of care.
- Ordering, reviewing, and interpreting labs, testing, and imaging.  - Independently obtaining a review of systems and performing a physical exam  - Reviewing prior hospitalization and where necessary, outpatient records.  - Reviewing consultant recommendations/communicating with consultants  - Counselling and educating patient and family regarding interpretation of aforementioned items and plan of care.

## 2023-11-09 ENCOUNTER — TRANSCRIPTION ENCOUNTER (OUTPATIENT)
Age: 88
End: 2023-11-09

## 2023-11-09 VITALS
HEART RATE: 87 BPM | OXYGEN SATURATION: 99 % | SYSTOLIC BLOOD PRESSURE: 108 MMHG | RESPIRATION RATE: 17 BRPM | DIASTOLIC BLOOD PRESSURE: 71 MMHG | TEMPERATURE: 98 F

## 2023-11-09 LAB
LYME IGG LINE BLOT INTERP.: NEGATIVE — SIGNIFICANT CHANGE UP
LYME IGG LINE BLOT INTERP.: NEGATIVE — SIGNIFICANT CHANGE UP
LYME IGM LINE BLOT INTERP.: NEGATIVE — SIGNIFICANT CHANGE UP
LYME IGM LINE BLOT INTERP.: NEGATIVE — SIGNIFICANT CHANGE UP
P18 AB. IGG: SIGNIFICANT CHANGE UP
P18 AB. IGG: SIGNIFICANT CHANGE UP
P23 AB. IGG: SIGNIFICANT CHANGE UP
P23 AB. IGG: SIGNIFICANT CHANGE UP
P23 AB. IGM: SIGNIFICANT CHANGE UP
P23 AB. IGM: SIGNIFICANT CHANGE UP
P28 AB. IGG: SIGNIFICANT CHANGE UP
P28 AB. IGG: SIGNIFICANT CHANGE UP
P30 AB. IGG: SIGNIFICANT CHANGE UP
P30 AB. IGG: SIGNIFICANT CHANGE UP
P39 AB. IGG: SIGNIFICANT CHANGE UP
P39 AB. IGG: SIGNIFICANT CHANGE UP
P39 AB. IGM: SIGNIFICANT CHANGE UP
P39 AB. IGM: SIGNIFICANT CHANGE UP
P41 AB. IGG: PRESENT
P41 AB. IGG: PRESENT
P41 AB. IGM: SIGNIFICANT CHANGE UP
P41 AB. IGM: SIGNIFICANT CHANGE UP
P45 AB. IGG: SIGNIFICANT CHANGE UP
P45 AB. IGG: SIGNIFICANT CHANGE UP
P58 AB. IGG: SIGNIFICANT CHANGE UP
P58 AB. IGG: SIGNIFICANT CHANGE UP
P66 AB. IGG: SIGNIFICANT CHANGE UP
P66 AB. IGG: SIGNIFICANT CHANGE UP
P93 AB. IGG: SIGNIFICANT CHANGE UP
P93 AB. IGG: SIGNIFICANT CHANGE UP

## 2023-11-09 PROCEDURE — 99239 HOSP IP/OBS DSCHRG MGMT >30: CPT

## 2023-11-09 RX ORDER — FAMOTIDINE 10 MG/ML
1 INJECTION INTRAVENOUS
Qty: 30 | Refills: 0
Start: 2023-11-09 | End: 2023-12-08

## 2023-11-09 RX ORDER — METOPROLOL TARTRATE 50 MG
1 TABLET ORAL
Qty: 60 | Refills: 0
Start: 2023-11-09 | End: 2023-12-08

## 2023-11-09 RX ORDER — ATORVASTATIN CALCIUM 80 MG/1
1 TABLET, FILM COATED ORAL
Qty: 30 | Refills: 0
Start: 2023-11-09 | End: 2023-12-08

## 2023-11-09 RX ORDER — DORZOLAMIDE HYDROCHLORIDE TIMOLOL MALEATE 20; 5 MG/ML; MG/ML
1 SOLUTION/ DROPS OPHTHALMIC
Refills: 0 | DISCHARGE

## 2023-11-09 RX ORDER — ASPIRIN/CALCIUM CARB/MAGNESIUM 324 MG
1 TABLET ORAL
Qty: 0 | Refills: 0 | DISCHARGE
Start: 2023-11-09

## 2023-11-09 RX ORDER — AMLODIPINE BESYLATE 2.5 MG/1
1 TABLET ORAL
Qty: 30 | Refills: 0
Start: 2023-11-09 | End: 2023-12-08

## 2023-11-09 RX ORDER — LATANOPROST 0.05 MG/ML
1 SOLUTION/ DROPS OPHTHALMIC; TOPICAL
Qty: 0 | Refills: 0 | DISCHARGE

## 2023-11-09 RX ORDER — DIVALPROEX SODIUM 500 MG/1
1 TABLET, DELAYED RELEASE ORAL
Qty: 60 | Refills: 0
Start: 2023-11-09 | End: 2023-12-08

## 2023-11-09 RX ADMIN — AMLODIPINE BESYLATE 10 MILLIGRAM(S): 2.5 TABLET ORAL at 06:01

## 2023-11-09 RX ADMIN — DIVALPROEX SODIUM 250 MILLIGRAM(S): 500 TABLET, DELAYED RELEASE ORAL at 06:00

## 2023-11-09 RX ADMIN — HEPARIN SODIUM 5000 UNIT(S): 5000 INJECTION INTRAVENOUS; SUBCUTANEOUS at 06:01

## 2023-11-09 RX ADMIN — DORZOLAMIDE HYDROCHLORIDE TIMOLOL MALEATE 1 DROP(S): 20; 5 SOLUTION/ DROPS OPHTHALMIC at 06:34

## 2023-11-09 RX ADMIN — Medication 25 MILLIGRAM(S): at 06:01

## 2023-11-09 NOTE — DISCHARGE NOTE PROVIDER - HOSPITAL COURSE
89 year old female with PMHx of HTN (not on any meds), glaucoma, and legal blindness who presented to the ED on 11/1/23 for complaints of altered mental status and admitted for acute metabolic encephalopathy, unclear etiology.       Acute metabolic encephalopathy  Daughter reports slurred speech  CTH  without acute intracranial finding  MRI brain with scattered ischemia but no acute infarction. Also w/ 1.7cm extra-axial probable meningioma along the medial inferior LEFT frontal region   CXR without infiltrates  U/A without infection   S/p LP in the ED  CSF mildly elevated glucose and protein, no significant neutrophils, gram stain without organisms  CSF PCR panel negative  CSF cultures without growth; titers are neg  BCx w/o growth  Unlikely meningitis, dc'd abx and observe  Suspect patient may have had seizure--prior records reviewed and patient with abnormal EEG ) with evidence for increased risk for seizures from area of focal dysfunction in left temporal region   Was started on Keppra but patient lost to follow up and refused to take Keppra due to sedating effects. Also advised by neurology in the past to take ASA 81 mg daily and atorvastatin 40 mg qhs but does not take either  Neurology consulted- started on depakote which patient is agreeable to take. DC keppra.     SIRS, unclear etiology, resolved  Tmax 101.3, , RR 24 on admission  CXR without infiltrates, U/A without infection  S/p LP in the ED  CSF results as above  S/p acyclovir, ceftriaxone, Bactrim, and vancomycin in the ED  Observing off of abx given negative culture data thus far and overall clinical improvement     ELMO   patient with elevation on sCr due to ELMO.   Etiology could be nephrotoxicity from acyclovir vs pre-renal azotemia.   Improved after 1L IVF.    Glaucoma  PTA dorzolamide-timolol and latanoprost    HTN  continue with Lopressor 25 mg bid for HR and BP control and Norvasc.      Discharge time : 40 min   RETURN PARAMETERS DISCUSSED WITH PATIENT and daughter, They EXPRESSED UNDERSTANDING AND IS AGREEABLE.    Care plan and all findings were discussed in detail with patient and daughter Kamilah .  All questions and concerns addressed     education on avoidance of driving, operating heavy machinery, showering alone or leaving the house alone , etc.  89 year old female with PMHx of HTN (not on any meds), glaucoma, and legal blindness who presented to the ED on 11/1/23 for complaints of altered mental status and admitted for acute metabolic encephalopathy, unclear etiology.     Vital Signs Last 24 Hrs  T(C): 36.2 (09 Nov 2023 05:12), Max: 37.1 (08 Nov 2023 17:31)  T(F): 97.2 (09 Nov 2023 05:12), Max: 98.7 (08 Nov 2023 17:31)  HR: 93 (09 Nov 2023 05:12) (84 - 94)  BP: 142/78 (09 Nov 2023 05:12) (124/78 - 142/78)  BP(mean): --  RR: 18 (09 Nov 2023 05:12) (16 - 18)  SpO2: 96% (09 Nov 2023 05:12) (95% - 98%)    Parameters below as of 08 Nov 2023 17:31  Patient On (Oxygen Delivery Method): room air          Acute metabolic encephalopathy  Daughter reports slurred speech  CTH  without acute intracranial finding  MRI brain with scattered ischemia but no acute infarction. Also w/ 1.7cm extra-axial probable meningioma along the medial inferior LEFT frontal region   CXR without infiltrates  U/A without infection   S/p LP in the ED  CSF mildly elevated glucose and protein, no significant neutrophils, gram stain without organisms  CSF PCR panel negative  CSF cultures without growth; titers are neg  BCx w/o growth  Unlikely meningitis, dc'd abx and observe  Suspect patient may have had seizure--prior records reviewed and patient with abnormal EEG ) with evidence for increased risk for seizures from area of focal dysfunction in left temporal region   Was started on Keppra but patient lost to follow up and refused to take Keppra due to sedating effects. Also advised by neurology in the past to take ASA 81 mg daily and atorvastatin 40 mg qhs but does not take either  Neurology consulted- started on depakote which patient is agreeable to take. DC keppra.     SIRS, unclear etiology, resolved, likely viral vs seizure related fever   meningitis ruled out   Tmax 101.3, , RR 24 on admission>>continues to be afebrile   CXR without infiltrates, U/A without infection  S/p LP in the ED  CSF results as above  S/p acyclovir, ceftriaxone, Bactrim, and vancomycin in the ED  Observing off of abx given negative culture data thus far and overall clinical improvement     ELMO   patient with elevation on sCr due to ELMO.   Etiology could be nephrotoxicity from acyclovir vs pre-renal azotemia.   Improved after 1L IVF.    Glaucoma  PTA dorzolamide-timolol and latanoprost    HTN  continue with Lopressor 25 mg bid,  Norvasc.      Discharge time : 40 min   RETURN PARAMETERS DISCUSSED WITH PATIENT and daughter, They EXPRESSED UNDERSTANDING AND IS AGREEABLE.    Care plan and all findings were discussed in detail with patient and daughter Kamilah .  All questions and concerns addressed     education on avoidance of driving, operating heavy machinery, showering alone or leaving the house alone , etc.  89 year old female with PMHx of HTN (not on any meds), glaucoma, and legal blindness who presented to the ED for AMS.   In the ED, Tmax 101.3, HR as elevated as 117, RR as elevated as 24, BP as elevated as 162/127. Labs grossly unremarkable except for potassium 3.0 and lactate 3.5. ABG 7.44 / 38 / 53 / 26. COVID/influenza negative. U/A with trace leuks, trace blood, occasional bacteria, squamous epithelial cells. NCHCT without acute intracranial finding. CXR unremarkable. LP CSF mildly elevated glucose and protein, elevated neutrophils, gram stain without organisms. CSF PCR negative. Received acyclovir, ceftriaxone, Bactrim, and vancomycin, acetaminophen 1 g IV, haloperidol 2.5 mg IV, and potassium repletion.    Vital Signs Last 24 Hrs  T(C): 36.2 (09 Nov 2023 05:12), Max: 37.1 (08 Nov 2023 17:31)  T(F): 97.2 (09 Nov 2023 05:12), Max: 98.7 (08 Nov 2023 17:31)  HR: 93 (09 Nov 2023 05:12) (84 - 94)  BP: 142/78 (09 Nov 2023 05:12) (124/78 - 142/78)  BP(mean): --  RR: 18 (09 Nov 2023 05:12) (16 - 18)  SpO2: 96% (09 Nov 2023 05:12) (95% - 98%)    Parameters below as of 08 Nov 2023 17:31  Patient On (Oxygen Delivery Method): room air  GENERAL: NAD, no increased WOB, clinically nontoxic appearing   HEAD:  Atraumatic, Normocephalic  ENMT: No tonsillar erythema, exudates, or enlargement; Moist mucous membranes   NECK: Supple, No JVD   NERVOUS SYSTEM:  awake and alert, moving extremities , legally blind   CHEST/LUNG: CTAB;  No rales, rhonchi, wheezing, or rubs  HEART: Regular rate and rhythm; No murmurs, rubs, or gallops  ABDOMEN: Soft, Nontender, Nondistended; Bowel sounds present  EXTREMITIES:  2+ Peripheral Pulses b/l, No clubbing, cyanosis, calf tenderness or edema b/l   EKG: NSR         Acute metabolic encephalopathy sec to probably seizure   Left frontal midline meningioma  CTH  without acute intracranial finding  MRI brain with scattered ischemia but no acute infarction. Also w/ 1.7cm extra-axial probable meningioma along the medial inferior LEFT frontal region   CXR without infiltrates  U/A without infection , no urinary complaints, UTI ruled out   S/p LP in the ED  CSF mildly elevated glucose and protein, no significant neutrophils, gram stain without organisms  CSF PCR panel negative  CSF cultures without growth; titers are neg  BCx w/o growth  Unlikely meningitis, dc'd abx , patient at baseline MS   seen by Neurology, Suspect patient may have had seizure--prior records reviewed and patient with abnormal EEG ) with evidence for increased risk for seizures from area of focal dysfunction in left temporal region   Was started on Keppra but patient lost to follow up and refused to take Keppra due to sedating effects. Also advised by neurology in the past to take ASA 81 mg daily and atorvastatin 40 mg qhs but does not take either  Neurology  started on depakote which patient is agreeable to take.     SIRS, unclear etiology, resolved, likely viral vs seizure related fever   meningitis ruled out   Tmax 101.3, , RR 24 on admission>>continues to be afebrile   CXR without infiltrates, U/A without infection  S/p LP in the ED  CSF results as above  S/p acyclovir, ceftriaxone, Bactrim, and vancomycin in the ED  remains afebrile, WBC wnl. at baseline MS. moving all extremities, speech is clear, no issues with eating or swallowing, clinically nontoxic appearing, on room air     ELMO   Etiology could be nephrotoxicity from acyclovir vs pre-renal azotemia.   normalized after 1L IVF.    Glaucoma  continue with home eye drops     HTN  continue with Lopressor 25 mg bid,  Norvasc.      Discharge time : 40 min   RETURN PARAMETERS DISCUSSED WITH PATIENT and daughter, They EXPRESSED UNDERSTANDING AND IS AGREEABLE.    Care plan and all findings were discussed in detail with patient and daughter Kamilah .  All questions and concerns addressed     education on avoidance of driving, operating heavy machinery, showering alone or leaving the house alone , etc.

## 2023-11-09 NOTE — DISCHARGE NOTE PROVIDER - PROVIDER TOKENS
FREE:[LAST:[your primary care doctor],PHONE:[(   )    -],FAX:[(   )    -],FOLLOWUP:[1 week]],PROVIDER:[TOKEN:[[id number]],FOLLOWUP:[2 weeks]]

## 2023-11-09 NOTE — DISCHARGE NOTE PROVIDER - CARE PROVIDER_API CALL
your primary care doctor,   Phone: (   )    -  Fax: (   )    -  Follow Up Time: 1 week    Subha Velasco  Neurology  [address] [place] [id number]  Phone: [phone]  Fax: [phone]  Follow Up Time: 2 weeks

## 2023-11-09 NOTE — DISCHARGE NOTE PROVIDER - NSDCFUADDAPPT_GEN_ALL_CORE_FT
It is important to see your primary physician as well as other necessary consultants within the next week to perform a comprehensive medical review.  Call their offices for an appointment as soon as you leave the hospital.  If you do not have a primary physician or cant reach him/her, contact the [facility] Physician Referral Service at [phone].  Your medical issues appear to be stable at this time, but if your symptoms recur or worsen, contact your physicians and/or return to the hospital if necessary.  If you encounter any issues or questions with your medication, call your physicians before stopping the medication.

## 2023-11-09 NOTE — DISCHARGE NOTE PROVIDER - NSDCMRMEDTOKEN_GEN_ALL_CORE_FT
dorzolamide 2% ophthalmic solution: 1 drop(s) in each eye 2 times a day  dorzolamide-timolol 2.23%-0.68% (2%-0.5% base) ophthalmic solution: 1 drop(s) in each eye 2 times a day  latanoprost 0.005% ophthalmic solution: 1 drop(s) in each eye once a day (at bedtime)   amLODIPine 10 mg oral tablet: 1 tab(s) orally once a day  aspirin 81 mg oral tablet, chewable: 1 tab(s) orally once a day  atorvastatin 20 mg oral tablet: 1 tab(s) orally once a day (at bedtime)  divalproex sodium 250 mg oral delayed release tablet: 1 tab(s) orally 2 times a day  dorzolamide 2% ophthalmic solution: 1 drop(s) in each eye 2 times a day  famotidine 20 mg oral tablet: 1 tab(s) orally once a day  metoprolol tartrate 25 mg oral tablet: 1 tab(s) orally 2 times a day

## 2023-11-09 NOTE — DISCHARGE NOTE NURSING/CASE MANAGEMENT/SOCIAL WORK - PATIENT PORTAL LINK FT
You can access the FollowMyHealth Patient Portal offered by [facility] by registering at the following website: [email or web address]. By joining [employer]’s FollowMyHealth portal, you will also be able to view your health information using other applications (apps) compatible with our system.

## 2023-11-09 NOTE — DISCHARGE NOTE NURSING/CASE MANAGEMENT/SOCIAL WORK - NSDCPEFALRISK_GEN_ALL_CORE
For information on Fall & Injury Prevention, visit: [email or web address] OR  [email or web address] OR  https://www.cdc.gov/steadi/patient.html

## 2023-11-09 NOTE — DISCHARGE NOTE NURSING/CASE MANAGEMENT/SOCIAL WORK - NSDCFUADDAPPT_GEN_ALL_CORE_FT
It is important to see your primary physician as well as other necessary consultants within the next week to perform a comprehensive medical review.  Call their offices for an appointment as soon as you leave the hospital.  If you do not have a primary physician or cant reach him/her, contact the [facility] Physician Referral Service at [phone].  Your medical issues appear to be stable at this time, but if your symptoms recur or worsen, contact your physicians and/or return to the hospital if necessary.  If you encounter any issues or questions with your medication, call your physicians before stopping the medication.

## 2023-11-09 NOTE — DISCHARGE NOTE PROVIDER - ATTENDING ATTESTATION STATEMENT
Patient : John Hernandez Age: 39 year old Sex: female   MRN: [id number] Encounter Date: 5/17/2023      History     Chief Complaint   Patient presents with   • Numbness   • Eye Problem   • Syncope     HPI  39-year-old female with a history of syncope presents with a near syncopal episode, slight vision changes and headache.  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  States that this is typical for her she has a history of syncope has never had significant workup for it.  States that she now has a slight headache, blurred vision.  She also complains of bilateral hand numbness.  Does not feel lightheaded.  Denies chest pain, shortness a breath.  Patient is very hypertensive on arrival, states that she does not have a history of hypertension does not take antihypertensives.  Does not have a primary doctor.  No Known Allergies    Current Discharge Medication List      Prior to Admission Medications    Details   norgestimate-ethinyl estradiol (Kim) 0.25-35 MG-MCG per tablet Take 1 tablet by mouth daily continuously  Qty: 112 tablet, Refills: 4      acetaminophen (TYLENOL) 325 MG tablet Take 2 tablets by mouth every 4 hours as needed for Pain.  Qty: 30 tablet, Refills: 0      ibuprofen (MOTRIN) 600 MG tablet Take 1 tablet by mouth every 6 hours as needed for Pain.  Qty: 30 tablet, Refills: 0             No past medical history on file.    No past surgical history on file.    No family history on file.    Social History     Tobacco Use   • Smoking status: Never   • Smokeless tobacco: Never   Vaping Use   • Vaping status: never used   Substance Use Topics   • Alcohol use: No   • Drug use: No       E-cigarette/Vaping   • E-Cigarette/Vaping Use Never Used      E-Cigarette/Vaping Substances & Devices       Review of Systems   All other systems reviewed and are negative.      Physical Exam     ED Triage Vitals [05/17/23 1244]   ED  Triage Vitals Group      Temp 98.6 °F (37 °C)      Heart Rate (!) 101      Resp 20      BP (!) 228/124      SpO2 97 %      EtCO2 mmHg       Height 4' 10\" (1.473 m)      Weight 183 lb 6.8 oz (83.2 kg)      Weight Scale Used Standing scale      BMI (Calculated) 38.33      IBW/kg (Calculated) 40.9       Physical Exam  Vitals and nursing note reviewed.   Constitutional:       Appearance: Normal appearance.   HENT:      Head: Normocephalic.      Nose: Nose normal.      Mouth/Throat:      Mouth: Mucous membranes are moist.   Eyes:      Extraocular Movements: Extraocular movements intact.      Pupils: Pupils are equal, round, and reactive to light.   Cardiovascular:      Rate and Rhythm: Normal rate.      Pulses: Normal pulses.   Pulmonary:      Effort: Pulmonary effort is normal. No respiratory distress.   Abdominal:      General: There is no distension.   Musculoskeletal:         General: Normal range of motion.      Cervical back: Normal range of motion.   Skin:     General: Skin is warm.      Capillary Refill: Capillary refill takes less than 2 seconds.   Neurological:      General: No focal deficit present.      Mental Status: She is alert and oriented to person, place, and time.      Cranial Nerves: No cranial nerve deficit.      Sensory: No sensory deficit.      Motor: No weakness.      Coordination: Coordination normal.   Psychiatric:         Mood and Affect: Mood normal.         ED Course     Procedures    Lab Results     Results for orders placed or performed during the hospital encounter of 05/17/23   Comprehensive Metabolic Panel   Result Value Ref Range    Fasting Status      Sodium 136 135 - 145 mmol/L    Potassium 3.5 3.4 - 5.1 mmol/L    Chloride 100 97 - 110 mmol/L    Carbon Dioxide 26 21 - 32 mmol/L    Anion Gap 14 7 - 19 mmol/L    Glucose 192 (H) 70 - 99 mg/dL    BUN 11 6 - 20 mg/dL    Creatinine 0.56 0.51 - 0.95 mg/dL    Glomerular Filtration Rate >90 >=60    BUN/Cr 20 7 - 25    Calcium 8.5 8.4 - 10.2  mg/dL    Bilirubin, Total 0.4 0.2 - 1.0 mg/dL    GOT/AST 32 <=37 Units/L    GPT/ALT 24 <64 Units/L    Alkaline Phosphatase 72 45 - 117 Units/L    Albumin 3.2 (L) 3.6 - 5.1 g/dL    Protein, Total 7.6 6.4 - 8.2 g/dL    Globulin 4.4 (H) 2.0 - 4.0 g/dL    A/G Ratio 0.7 (L) 1.0 - 2.4   Magnesium   Result Value Ref Range    Magnesium 1.8 1.7 - 2.4 mg/dL   TROPONIN I, HIGH SENSITIVITY   Result Value Ref Range    Troponin I, High Sensitivity 12 <52 ng/L   Urinalysis & Reflex Microscopy With Culture If Indicated   Result Value Ref Range    COLOR, URINALYSIS Straw     APPEARANCE, URINALYSIS Clear     GLUCOSE, URINALYSIS Negative Negative mg/dL    BILIRUBIN, URINALYSIS Negative Negative    KETONES, URINALYSIS Negative Negative mg/dL    SPECIFIC GRAVITY, URINALYSIS 1.014 1.005 - 1.030    OCCULT BLOOD, URINALYSIS Negative Negative    PH, URINALYSIS 6.5 5.0 - 7.0    PROTEIN, URINALYSIS Negative Negative mg/dL    UROBILINOGEN, URINALYSIS 0.2 0.2, 1.0 mg/dL    NITRITE, URINALYSIS Negative Negative    LEUKOCYTE ESTERASE, URINALYSIS Negative Negative   CBC with Automated Differential (performable only)   Result Value Ref Range    WBC 12.9 (H) 4.2 - 11.0 K/mcL    RBC 4.54 4.00 - 5.20 mil/mcL    HGB 12.8 12.0 - 15.5 g/dL    HCT 39.0 36.0 - 46.5 %    MCV 85.9 78.0 - 100.0 fl    MCH 28.2 26.0 - 34.0 pg    MCHC 32.8 32.0 - 36.5 g/dL    RDW-CV 13.8 11.0 - 15.0 %    RDW-SD 43.0 39.0 - 50.0 fL     140 - 450 K/mcL    NRBC 0 <=0 /100 WBC    Neutrophil, Percent 74 %    Lymphocytes, Percent 20 %    Mono, Percent 4 %    Eosinophils, Percent 1 %    Basophils, Percent 0 %    Immature Granulocytes 1 %    Absolute Neutrophils 9.7 (H) 1.8 - 7.7 K/mcL    Absolute Lymphocytes 2.5 1.0 - 4.8 K/mcL    Absolute Monocytes 0.5 0.3 - 0.9 K/mcL    Absolute Eosinophils  0.1 0.0 - 0.5 K/mcL    Absolute Basophils 0.0 0.0 - 0.3 K/mcL    Absolute Immature Granulocytes 0.1 0.0 - 0.2 K/mcL       EKG Results     EKG was personally reviewed does not demonstrate  ST elevation, depression or other signs of ischemia.  Patient is in normal sinus rhythm.  EKG tracing interpreted by ED physician    Radiology Results     Imaging Results          CT HEAD WO CONTRAST (In process)  Result time 05/17/23 14:25:00                ED Medication Orders (From admission, onward)    Ordered Start     Status Ordering Provider    05/17/23 1404 05/17/23 1405  acetaminophen (TYLENOL) tablet 650 mg  ONCE         Last MAR action: Given JUDI CORBIN    05/17/23 1404 05/17/23 1405  ketorolac (TORADOL) injection 15 mg  ONCE         Last MAR action: Given JUDI CORBIN    05/17/23 1359 05/17/23 1400  labetalol (NORMODYNE) injection 20 mg  ONCE         Last MAR action: Given JUDI CORBIN    05/17/23 1256 05/17/23 1257  hydrALAZINE (APRESOLINE) injection 10 mg  ONCE         Last MAR action: Given JUDI CORBIN               MDM  On arrival patient stable significantly hypertensive.  Given history and presentation concern for presyncope this is likely acute on chronic phenomenon, doubt this represents cardiogenic syncope however this was considered.  More concern for significant elevated blood pressure concern for hypertensive emergency with slight headache and visual changes.  Patient is otherwise neurologically intact.    EKG discussed above without concerning findings.  Laboratory workup was performed and personally reviewed does not demonstrate concerning abnormalities concerning hypertensive emergency including normal creatinine, troponin normal.    A noncontrast head CT was performed was personally interpreted there is no major mass, head bleed or other concerning abnormality.    Patient was given hydralazine without improvement of blood pressure, patient was then given labetalol with improvement.  Ultimately given headache and visual changes will admit for hypertensive emergency.  Hospitalist was contacted over the phone and agreeable to admission.      Clinical Impression     ED Diagnosis   1.  Hypertensive emergency            Disposition        There is no disposition no dispo time  There is no comment       Rei Helms MD  05/17/23 9738     I have personally seen and examined the patient. I have collaborated with and supervised the

## 2023-11-09 NOTE — DISCHARGE NOTE PROVIDER - NSDCCPCAREPLAN_GEN_ALL_CORE_FT
PRINCIPAL DISCHARGE DIAGNOSIS  Diagnosis: AMS (altered mental status)  Assessment and Plan of Treatment:       SECONDARY DISCHARGE DIAGNOSES  Diagnosis: Glaucoma  Assessment and Plan of Treatment:     Diagnosis: HTN (hypertension)  Assessment and Plan of Treatment:     Diagnosis: Dementia  Assessment and Plan of Treatment:     Diagnosis: Seizures  Assessment and Plan of Treatment:     Diagnosis: Legally blind  Assessment and Plan of Treatment:

## 2023-11-13 DIAGNOSIS — R65.10 SYSTEMIC INFLAMMATORY RESPONSE SYNDROME (SIRS) OF NON-INFECTIOUS ORIGIN WITHOUT ACUTE ORGAN DYSFUNCTION: ICD-10-CM

## 2023-11-13 DIAGNOSIS — E87.6 HYPOKALEMIA: ICD-10-CM

## 2023-11-13 DIAGNOSIS — N17.9 ACUTE KIDNEY FAILURE, UNSPECIFIED: ICD-10-CM

## 2023-11-13 DIAGNOSIS — H40.9 UNSPECIFIED GLAUCOMA: ICD-10-CM

## 2023-11-13 DIAGNOSIS — G40.909 EPILEPSY, UNSPECIFIED, NOT INTRACTABLE, WITHOUT STATUS EPILEPTICUS: ICD-10-CM

## 2023-11-13 DIAGNOSIS — I10 ESSENTIAL (PRIMARY) HYPERTENSION: ICD-10-CM

## 2023-11-13 DIAGNOSIS — E87.20 ACIDOSIS, UNSPECIFIED: ICD-10-CM

## 2023-11-13 DIAGNOSIS — H54.8 LEGAL BLINDNESS, AS DEFINED IN USA: ICD-10-CM

## 2023-11-13 DIAGNOSIS — Z79.82 LONG TERM (CURRENT) USE OF ASPIRIN: ICD-10-CM

## 2023-11-13 DIAGNOSIS — R41.82 ALTERED MENTAL STATUS, UNSPECIFIED: ICD-10-CM

## 2023-11-13 DIAGNOSIS — Z88.0 ALLERGY STATUS TO PENICILLIN: ICD-10-CM

## 2023-11-13 DIAGNOSIS — G93.41 METABOLIC ENCEPHALOPATHY: ICD-10-CM

## 2023-11-13 DIAGNOSIS — T37.5X5A ADVERSE EFFECT OF ANTIVIRAL DRUGS, INITIAL ENCOUNTER: ICD-10-CM

## 2023-11-13 DIAGNOSIS — D32.0 BENIGN NEOPLASM OF CEREBRAL MENINGES: ICD-10-CM

## 2023-12-02 LAB
CULTURE RESULTS: SIGNIFICANT CHANGE UP
CULTURE RESULTS: SIGNIFICANT CHANGE UP
SPECIMEN SOURCE: SIGNIFICANT CHANGE UP
SPECIMEN SOURCE: SIGNIFICANT CHANGE UP

## 2023-12-08 ENCOUNTER — EMERGENCY (EMERGENCY)
Facility: HOSPITAL | Age: 88
LOS: 0 days | Discharge: HOME | End: 2023-12-08
Attending: STUDENT IN AN ORGANIZED HEALTH CARE EDUCATION/TRAINING PROGRAM
Payer: MEDICARE

## 2023-12-08 VITALS
RESPIRATION RATE: 17 BRPM | SYSTOLIC BLOOD PRESSURE: 145 MMHG | DIASTOLIC BLOOD PRESSURE: 80 MMHG | OXYGEN SATURATION: 99 % | HEART RATE: 79 BPM

## 2023-12-08 VITALS
SYSTOLIC BLOOD PRESSURE: 148 MMHG | WEIGHT: 160.06 LBS | HEART RATE: 80 BPM | RESPIRATION RATE: 16 BRPM | TEMPERATURE: 98 F | HEIGHT: 64 IN | OXYGEN SATURATION: 100 % | DIASTOLIC BLOOD PRESSURE: 82 MMHG

## 2023-12-08 DIAGNOSIS — Z86.69 PERSONAL HISTORY OF OTHER DISEASES OF THE NERVOUS SYSTEM AND SENSE ORGANS: ICD-10-CM
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
## 2023-12-08 DIAGNOSIS — Z88.0 ALLERGY STATUS TO PENICILLIN: ICD-10-CM

## 2023-12-08 DIAGNOSIS — S09.93XA UNSPECIFIED INJURY OF FACE, INITIAL ENCOUNTER: ICD-10-CM

## 2023-12-08 DIAGNOSIS — S02.2XXA FRACTURE OF NASAL BONES, INITIAL ENCOUNTER FOR CLOSED FRACTURE: ICD-10-CM

## 2023-12-08 DIAGNOSIS — H54.8 LEGAL BLINDNESS, AS DEFINED IN USA: ICD-10-CM

## 2023-12-08 DIAGNOSIS — I10 ESSENTIAL (PRIMARY) HYPERTENSION: ICD-10-CM

## 2023-12-08 DIAGNOSIS — Z79.82 LONG TERM (CURRENT) USE OF ASPIRIN: ICD-10-CM

## 2023-12-08 DIAGNOSIS — F01.50 VASCULAR DEMENTIA WITHOUT BEHAVIORAL DISTURBANCE: ICD-10-CM

## 2023-12-08 PROCEDURE — 72170 X-RAY EXAM OF PELVIS: CPT | Mod: 26

## 2023-12-08 PROCEDURE — G1004: CPT

## 2023-12-08 PROCEDURE — 72125 CT NECK SPINE W/O DYE: CPT | Mod: 26,ME

## 2023-12-08 PROCEDURE — 71045 X-RAY EXAM CHEST 1 VIEW: CPT | Mod: 26

## 2023-12-08 PROCEDURE — 99284 EMERGENCY DEPT VISIT MOD MDM: CPT

## 2023-12-08 PROCEDURE — 70486 CT MAXILLOFACIAL W/O DYE: CPT | Mod: 26,ME

## 2023-12-08 PROCEDURE — 70450 CT HEAD/BRAIN W/O DYE: CPT | Mod: 26,ME

## 2023-12-08 RX ORDER — OXYMETAZOLINE HYDROCHLORIDE 0.5 MG/ML
2 SPRAY NASAL ONCE
Refills: 0 | Status: COMPLETED | OUTPATIENT
Start: 2023-12-08 | End: 2023-12-08

## 2023-12-08 RX ADMIN — OXYMETAZOLINE HYDROCHLORIDE 2 SPRAY(S): 0.5 SPRAY NASAL at 01:23

## 2023-12-08 NOTE — ED PROVIDER NOTE - CLINICAL SUMMARY MEDICAL DECISION MAKING FREE TEXT BOX
Pt presents with mechanical fall-rolled out of bed and hit face. Nasal bone fx on ct. Bleeding stopped with afrin. Daughter states sleeping with heat at 89. Will give saline drops, Pt presents with mechanical fall-rolled out of bed and hit face. Nasal bone fx on ct. Bleeding stopped with afrin. Daughter states sleeping with heat at 89. Will give saline drops, doxy, and ent follow-up. Recommended humidifier. Daughter and pt explained instructions and recommendations.

## 2023-12-08 NOTE — ED ADULT TRIAGE NOTE - NS ED NURSE AMBULANCES
[facility]

## 2023-12-08 NOTE — ED PROVIDER NOTE - PROVIDER TOKENS
89 year old female with history of Dementia, Anxiety, HTN, HLD and Breast Cancer s/p Mastectomy + Chemo brought by EMS with altered mental status. As per daughter - Tami Rao, patient was doing well until yesterday. Last night, she had a fall and was brought to the hospital. She was evaluated and later was discharged home. She was agitated when she got home but after some time she went to sleep. Later during the morning, family found her on the floor. She was confused so she was brought to the hospital. Family noticed some shortness of breath yesterday which they were attributing to agitation. No fever, sick contacts or recent travel.  In ER, she was found to have Sepsis likely secondary to Pneumonia, she was given IVF and Zosyn/Vanco. Also found to have NSTEMI and PE, seen by Cardiology and was started on Heparin Infusion. Bedside ECHO with EF of 30%.     1) Sepsis   - Likely secondary to Aspiration Pneumonia   - Cultures negative   - s/p Zosyn and Vanco in ER  - Continue Zosyn  - If no improvement, will consider ID eval    2) Dysphagia  - Puree diet only, no liquids  - Speech therapy following     3) NSTEMI  - H/O HTN and HLD  - ECHO as above   - s/p Heparin Infusion   - Started Aspirin   - Continue Lipitor and Metoprolol   - Cardiology follow up noted: medical management      4) Acute Systolic Heart Failure   - Strict intake/output and daily weight  - ECHO as above   - Hold Lasix   - Continue Metoprolol and Losartan   - Continue Farxiga   - Cardiology follow up noted      5) Paroxysmal A. Fib   - Newly diagnosed   - Continue Metoprolol  - Heparin Infusion was switched to FD Lovenox (switch to DOAC on discharge)     6) Acute Pulmonary Embolism  - Heparin Infusion was switched to FD Lovenox (switch to DOAC on discharge)   - No DVT on LE Doppler     7) Pneumothorax  - Tiny right sided  - Likely due to pneumonia  - Monitor     8) Dementia with Behavioral Symptoms  - Continue Seroquel at bedtime    9) Prediabetes  - HbA1c 6.2  - Carb consistent diet     DVT Prophylaxis -- Patient is on FD Lovenox.     Advance Directives: DNR/I. Prognosis guarded. Palliative Care Consult appreciated.     Dispo: Likely home once stable.      89 year old female with history of Dementia, Anxiety, HTN, HLD and Breast Cancer s/p Mastectomy + Chemo brought by EMS with altered mental status. As per daughter - Tami Rao, patient was doing well until yesterday. Last night, she had a fall and was brought to the hospital. She was evaluated and later was discharged home. She was agitated when she got home but after some time she went to sleep. Later during the morning, family found her on the floor. She was confused so she was brought to the hospital. Family noticed some shortness of breath yesterday which they were attributing to agitation. No fever, sick contacts or recent travel.  In ER, she was found to have Sepsis likely secondary to Pneumonia, she was given IVF and Zosyn/Vanco. Also found to have NSTEMI and PE, seen by Cardiology and was started on Heparin Infusion. Bedside ECHO with EF of 30%.     1) Sepsis   - Likely secondary to Aspiration Pneumonia   - Cultures negative   - s/p Zosyn and Vanco in ER  - Continue Zosyn  - If no improvement, will consider ID eval    2) Dysphagia  - Puree diet only, no liquids  - Speech therapy following     3) NSTEMI  - H/O HTN and HLD  - ECHO as above   - s/p Heparin Infusion   - Started Aspirin   - Continue Lipitor and Metoprolol   - Cardiology follow up noted: medical management      4) Acute Systolic Heart Failure   - Strict intake/output and daily weight  - ECHO as above   - Hold Lasix   - Continue Metoprolol and Losartan   - Continue Farxiga   - Cardiology follow up noted      5) Paroxysmal A. Fib   - Newly diagnosed   - Continue Metoprolol  - Heparin Infusion was switched to FD Lovenox (switch to DOAC on discharge)     6) Acute Pulmonary Embolism  - Heparin Infusion was switched to FD Lovenox (switch to DOAC on discharge)   - No DVT on LE Doppler     7) Pneumothorax  - Tiny right sided  - Likely due to pneumonia  - Monitor     8) Dementia with Behavioral Symptoms  - Continue Seroquel at bedtime    9) Prediabetes  - HbA1c 6.2  - Carb consistent diet     DVT Prophylaxis -- Patient is on FD Lovenox.     Advance Directives: DNR/I. Prognosis guarded. Palliative Care Consult appreciated. Family is leaning towards Hospice Care.     Dispo: Likely Home Hospice in 2-3 days.      PROVIDER:[TOKEN:[[id number]],FOLLOWUP:[4-6 Days]]

## 2023-12-08 NOTE — ED PROVIDER NOTE - NSFOLLOWUPINSTRUCTIONS_ED_ALL_ED_FT
You were evaluated in the ER for a fall and found to have a small nasal bone fracture. Avoid drinking through a straw or blowing your nose. Use a humidifier in your room and use saline drops in both nostrils as needed to maintain moisture of nasal mucosa and avoid bleeding. Take antibiotic as prescribed. Return to the ER if you have repeat bleeding you are unable to control by pinching nose and holding face forward. You were evaluated in the ER for a fall and found to have a small nasal bone fracture. Avoid drinking through a straw or blowing your nose. Use a humidifier in your room and use saline drops given to you in ER in both nostrils as needed to maintain moisture of nasal mucosa and avoid bleeding. Take antibiotic as prescribed. Return to the ER if you have repeat bleeding you are unable to control by pinching nose and holding face forward.

## 2023-12-08 NOTE — ED ADULT TRIAGE NOTE - CHIEF COMPLAINT QUOTE
as per daughter pt was laying on the couch pulled the blanket, rolled off the couch. abrasion noted at the bridge of the nose, dry blood in the nostrils. as per daughter, bleeding from the mouth as well. complains of neck, nose pain and headache. denies blood thinners h/o legally blind, dementia ,HTN

## 2023-12-08 NOTE — ED PROVIDER NOTE - PHYSICAL EXAMINATION
General: well appearing elderly in nad, axoxo3  HEENT: small abrasion over nose bridge. dried blood in b/l nares. No septal hematoma. Minimal bleeding but pt picking nose. No tongue laceration or loose dentition  Cards: RRR no m/r/g  Pulm: CTAB no w/r/r  Abd: soft, nd/nt no rebound or guarding  Ext: no deformities  neuro: axoxo3, speaking full sentences, following commands, moving extremities

## 2023-12-08 NOTE — ED ADULT NURSE NOTE - NSFALLHARMRISKINTERV_ED_ALL_ED
Assistance OOB with selected safe patient handling equipment if applicable/Assistance with ambulation/Communicate risk of Fall with Harm to all staff, patient, and family/Monitor gait and stability/Provide patient with walking aids/Provide visual cue: red socks, yellow wristband, yellow gown, etc/Reinforce activity limits and safety measures with patient and family/Bed in lowest position, wheels locked, appropriate side rails in place/Call bell, personal items and telephone in reach/Instruct patient to call for assistance before getting out of bed/chair/stretcher/Non-slip footwear applied when patient is off stretcher/[place] to call system/Physically safe environment - no spills, clutter or unnecessary equipment/Purposeful Proactive Rounding/Room/bathroom lighting operational, light cord in reach Assistance OOB with selected safe patient handling equipment if applicable/Assistance with ambulation/Communicate risk of Fall with Harm to all staff, patient, and family/Monitor gait and stability/Provide patient with walking aids/Provide visual cue: red socks, yellow wristband, yellow gown, etc/Reinforce activity limits and safety measures with patient and family/Bed in lowest position, wheels locked, appropriate side rails in place/Call bell, personal items and telephone in reach/Instruct patient to call for assistance before getting out of bed/chair/stretcher/Non-slip footwear applied when patient is off stretcher/[place] to call system/Physically safe environment - no spills, clutter or unnecessary equipment/Purposeful Proactive Rounding/Room/bathroom lighting operational, light cord in reach

## 2023-12-08 NOTE — ED ADULT TRIAGE NOTE - NS_BHTRGSOURCE_ED_A_ED_FT
Relevant Problems   No relevant active problems       Anesthetic History   No history of anesthetic complications            Review of Systems / Medical History  Patient summary reviewed, nursing notes reviewed and pertinent labs reviewed    Pulmonary  Within defined limits                 Neuro/Psych   Within defined limits           Cardiovascular    Hypertension              Exercise tolerance: >4 METS     GI/Hepatic/Renal         Renal disease (congenital abscence of kidney)       Endo/Other        Obesity     Other Findings              Physical Exam    Airway  Mallampati: II  TM Distance: 4 - 6 cm  Neck ROM: normal range of motion   Mouth opening: Normal     Cardiovascular    Rhythm: regular  Rate: normal         Dental  No notable dental hx       Pulmonary  Breath sounds clear to auscultation               Abdominal  GI exam deferred       Other Findings            Anesthetic Plan    ASA: 2  Anesthesia type: general          Induction: Intravenous  Anesthetic plan and risks discussed with: Patient Kamilah Lawson

## 2023-12-08 NOTE — ED PROVIDER NOTE - PATIENT PORTAL LINK FT
You can access the FollowMyHealth Patient Portal offered by [facility] by registering at the following website: [email or web address]. By joining [facility]’s FollowMyHealth portal, you will also be able to view your health information using other applications (apps) compatible with our system. You can access the FollowMyHealth Patient Portal offered by [facility] by registering at the following website: [email or web address]. By joining [employer]’s FollowMyHealth portal, you will also be able to view your health information using other applications (apps) compatible with our system.

## 2023-12-08 NOTE — ED PROVIDER NOTE - CARE PROVIDER_API CALL
Douglas Vegas  Otolaryngology  [address], [place] [address]  Phone: [phone]  Fax: [phone]  Follow Up Time: 4-6 Days   Douglas Vegas  Otolaryngology  [address], [place] [address]  Phone: [phone]  Fax: [phone]  Follow Up Time: 4-6 Days

## 2023-12-08 NOTE — ED ADULT TRIAGE NOTE - GLASGOW COMA SCALE: SCORE, MLM
Department of Anesthesiology  Postprocedure Note    Patient: Darleen Arellano  MRN: [id number]  YOB: 1963  Date of evaluation: 4/18/2023      Procedure Summary     Date: 04/18/23 Room / Location: [facility] ENDO 01 / [facility] ENDOSCOPY    Anesthesia Start: 1501 Anesthesia Stop: 7905    Procedure: EGD BIOPSY Diagnosis:       Nausea and vomiting      Abdominal pain      (Nausea and vomiting [R11.2])      (Abdominal pain [R10.9])    Providers: Izaiah Mandujano MD Responsible Provider: Payal Larsen MD    Anesthesia Type: General ASA Status: 3          Anesthesia Type: General    Cinthia Phase I: Cinthia Score: 10    Cinthia Phase II: Cinthia Score: 10      Anesthesia Post Evaluation    Patient location during evaluation: PACU  Patient participation: complete - patient participated  Level of consciousness: awake and alert  Airway patency: patent  Nausea: well controlled. Complications: no  Cardiovascular status: acceptable.   Respiratory status: acceptable  Hydration status: stable
15

## 2023-12-08 NOTE — ED PROVIDER NOTE - OBJECTIVE STATEMENT
89 yo F pmh vascular dementia, seizure, HTN here for fall and facial trauma. as per daughter pt has been sleeping on couch until her first floor bed comes in (family is avoiding pt sleeping in 2nd floor due to new sz diagnosis), when she rolled and didn't realize she was close to edge (pt is legally blind) and fell onto her forehead/nasal bone region. Daughter was able to see the fall on camera. No other injuries reported. Pt had a nose bleed that is subsiding. Spit up blood x2 likely from nose as no oral injuries. Pt on asa no AC. Denies other injuries.

## 2023-12-20 LAB
CULTURE RESULTS: SIGNIFICANT CHANGE UP
CULTURE RESULTS: SIGNIFICANT CHANGE UP
NIGHT BLUE STAIN TISS: SIGNIFICANT CHANGE UP
NIGHT BLUE STAIN TISS: SIGNIFICANT CHANGE UP
SPECIMEN SOURCE: SIGNIFICANT CHANGE UP
SPECIMEN SOURCE: SIGNIFICANT CHANGE UP

## 2024-04-02 ENCOUNTER — INPATIENT (INPATIENT)
Facility: HOSPITAL | Age: 89
LOS: 2 days | Discharge: HOME HEALTH | End: 2024-04-05
Attending: INTERNAL MEDICINE | Admitting: INTERNAL MEDICINE
Payer: MEDICARE

## 2024-04-02 VITALS
DIASTOLIC BLOOD PRESSURE: 89 MMHG | HEART RATE: 107 BPM | HEIGHT: 64 IN | SYSTOLIC BLOOD PRESSURE: 171 MMHG | RESPIRATION RATE: 15 BRPM | OXYGEN SATURATION: 98 % | WEIGHT: 130.07 LBS | TEMPERATURE: 98 F

## 2024-04-02 DIAGNOSIS — E78.5 HYPERLIPIDEMIA, UNSPECIFIED: ICD-10-CM

## 2024-04-02 DIAGNOSIS — I10 ESSENTIAL (PRIMARY) HYPERTENSION: ICD-10-CM

## 2024-04-02 DIAGNOSIS — F03.90 UNSPECIFIED DEMENTIA WITHOUT BEHAVIORAL DISTURBANCE: ICD-10-CM

## 2024-04-02 DIAGNOSIS — E87.8 OTHER DISORDERS OF ELECTROLYTE AND FLUID BALANCE, NOT ELSEWHERE CLASSIFIED: ICD-10-CM

## 2024-04-02 DIAGNOSIS — R41.82 ALTERED MENTAL STATUS, UNSPECIFIED: ICD-10-CM

## 2024-04-02 LAB
ALBUMIN SERPL ELPH-MCNC: 3.1 G/DL — LOW (ref 3.3–5)
ALP SERPL-CCNC: 88 U/L — SIGNIFICANT CHANGE UP (ref 40–120)
ALT FLD-CCNC: 27 U/L — SIGNIFICANT CHANGE UP (ref 12–78)
ANION GAP SERPL CALC-SCNC: 7 MMOL/L — SIGNIFICANT CHANGE UP (ref 5–17)
APPEARANCE UR: CLEAR — SIGNIFICANT CHANGE UP
APTT BLD: 16.6 SEC — LOW (ref 24.5–35.6)
AST SERPL-CCNC: 34 U/L — SIGNIFICANT CHANGE UP (ref 15–37)
BACTERIA # UR AUTO: ABNORMAL /HPF
BASOPHILS # BLD AUTO: 0.01 K/UL — SIGNIFICANT CHANGE UP (ref 0–0.2)
BASOPHILS NFR BLD AUTO: 0.1 % — SIGNIFICANT CHANGE UP (ref 0–2)
BILIRUB SERPL-MCNC: 0.4 MG/DL — SIGNIFICANT CHANGE UP (ref 0.2–1.2)
BILIRUB UR-MCNC: NEGATIVE — SIGNIFICANT CHANGE UP
BUN SERPL-MCNC: 11 MG/DL — SIGNIFICANT CHANGE UP (ref 7–23)
CALCIUM SERPL-MCNC: 8.9 MG/DL — SIGNIFICANT CHANGE UP (ref 8.5–10.1)
CHLORIDE SERPL-SCNC: 104 MMOL/L — SIGNIFICANT CHANGE UP (ref 96–108)
CO2 SERPL-SCNC: 27 MMOL/L — SIGNIFICANT CHANGE UP (ref 22–31)
COLOR SPEC: YELLOW — SIGNIFICANT CHANGE UP
CREAT SERPL-MCNC: 0.72 MG/DL — SIGNIFICANT CHANGE UP (ref 0.5–1.3)
DIFF PNL FLD: NEGATIVE — SIGNIFICANT CHANGE UP
EGFR: 78 ML/MIN/1.73M2 — SIGNIFICANT CHANGE UP
EOSINOPHIL # BLD AUTO: 0 K/UL — SIGNIFICANT CHANGE UP (ref 0–0.5)
EOSINOPHIL NFR BLD AUTO: 0 % — SIGNIFICANT CHANGE UP (ref 0–6)
EPI CELLS # UR: PRESENT
GLUCOSE SERPL-MCNC: 134 MG/DL — HIGH (ref 70–99)
GLUCOSE UR QL: NEGATIVE MG/DL — SIGNIFICANT CHANGE UP
HCT VFR BLD CALC: 33.1 % — LOW (ref 34.5–45)
HGB BLD-MCNC: 11.4 G/DL — LOW (ref 11.5–15.5)
IMM GRANULOCYTES NFR BLD AUTO: 0.5 % — SIGNIFICANT CHANGE UP (ref 0–0.9)
KETONES UR-MCNC: NEGATIVE MG/DL — SIGNIFICANT CHANGE UP
LACTATE SERPL-SCNC: 1.8 MMOL/L — SIGNIFICANT CHANGE UP (ref 0.7–2)
LEUKOCYTE ESTERASE UR-ACNC: ABNORMAL
LYMPHOCYTES # BLD AUTO: 0.82 K/UL — LOW (ref 1–3.3)
LYMPHOCYTES # BLD AUTO: 9.2 % — LOW (ref 13–44)
MAGNESIUM SERPL-MCNC: 1.8 MG/DL — SIGNIFICANT CHANGE UP (ref 1.6–2.6)
MCHC RBC-ENTMCNC: 31.2 PG — SIGNIFICANT CHANGE UP (ref 27–34)
MCHC RBC-ENTMCNC: 34.4 G/DL — SIGNIFICANT CHANGE UP (ref 32–36)
MCV RBC AUTO: 90.7 FL — SIGNIFICANT CHANGE UP (ref 80–100)
MONOCYTES # BLD AUTO: 0.61 K/UL — SIGNIFICANT CHANGE UP (ref 0–0.9)
MONOCYTES NFR BLD AUTO: 6.9 % — SIGNIFICANT CHANGE UP (ref 2–14)
NEUTROPHILS # BLD AUTO: 7.39 K/UL — SIGNIFICANT CHANGE UP (ref 1.8–7.4)
NEUTROPHILS NFR BLD AUTO: 83.3 % — HIGH (ref 43–77)
NITRITE UR-MCNC: NEGATIVE — SIGNIFICANT CHANGE UP
NRBC # BLD: 0 /100 WBCS — SIGNIFICANT CHANGE UP (ref 0–0)
PH UR: 8 — SIGNIFICANT CHANGE UP (ref 5–8)
PHOSPHATE SERPL-MCNC: 2 MG/DL — LOW (ref 2.5–4.5)
PLATELET # BLD AUTO: 201 K/UL — SIGNIFICANT CHANGE UP (ref 150–400)
POTASSIUM SERPL-MCNC: 3.2 MMOL/L — LOW (ref 3.5–5.3)
POTASSIUM SERPL-SCNC: 3.2 MMOL/L — LOW (ref 3.5–5.3)
PROT SERPL-MCNC: 7.8 GM/DL — SIGNIFICANT CHANGE UP (ref 6–8.3)
PROT UR-MCNC: NEGATIVE MG/DL — SIGNIFICANT CHANGE UP
RBC # BLD: 3.65 M/UL — LOW (ref 3.8–5.2)
RBC # FLD: 13.7 % — SIGNIFICANT CHANGE UP (ref 10.3–14.5)
RBC CASTS # UR COMP ASSIST: 0 /HPF — SIGNIFICANT CHANGE UP (ref 0–4)
SODIUM SERPL-SCNC: 138 MMOL/L — SIGNIFICANT CHANGE UP (ref 135–145)
SP GR SPEC: 1.01 — SIGNIFICANT CHANGE UP (ref 1–1.03)
TROPONIN I, HIGH SENSITIVITY RESULT: 26.8 NG/L — SIGNIFICANT CHANGE UP
UROBILINOGEN FLD QL: 0.2 MG/DL — SIGNIFICANT CHANGE UP (ref 0.2–1)
WBC # BLD: 8.87 K/UL — SIGNIFICANT CHANGE UP (ref 3.8–10.5)
WBC # FLD AUTO: 8.87 K/UL — SIGNIFICANT CHANGE UP (ref 3.8–10.5)
WBC UR QL: 5 /HPF — SIGNIFICANT CHANGE UP (ref 0–5)

## 2024-04-02 PROCEDURE — 71045 X-RAY EXAM CHEST 1 VIEW: CPT | Mod: 26

## 2024-04-02 PROCEDURE — 99285 EMERGENCY DEPT VISIT HI MDM: CPT

## 2024-04-02 PROCEDURE — 93010 ELECTROCARDIOGRAM REPORT: CPT

## 2024-04-02 PROCEDURE — 70450 CT HEAD/BRAIN W/O DYE: CPT | Mod: 26,MC

## 2024-04-02 PROCEDURE — 99222 1ST HOSP IP/OBS MODERATE 55: CPT

## 2024-04-02 RX ORDER — POTASSIUM CHLORIDE 20 MEQ
40 PACKET (EA) ORAL ONCE
Refills: 0 | Status: COMPLETED | OUTPATIENT
Start: 2024-04-02 | End: 2024-04-02

## 2024-04-02 RX ORDER — DORZOLAMIDE HYDROCHLORIDE 20 MG/ML
1 SOLUTION/ DROPS OPHTHALMIC
Refills: 0 | Status: DISCONTINUED | OUTPATIENT
Start: 2024-04-02 | End: 2024-04-05

## 2024-04-02 RX ORDER — AMLODIPINE BESYLATE 2.5 MG/1
10 TABLET ORAL DAILY
Refills: 0 | Status: DISCONTINUED | OUTPATIENT
Start: 2024-04-02 | End: 2024-04-05

## 2024-04-02 RX ORDER — ACETAMINOPHEN 500 MG
650 TABLET ORAL EVERY 6 HOURS
Refills: 0 | Status: DISCONTINUED | OUTPATIENT
Start: 2024-04-02 | End: 2024-04-05

## 2024-04-02 RX ORDER — ATORVASTATIN CALCIUM 80 MG/1
20 TABLET, FILM COATED ORAL AT BEDTIME
Refills: 0 | Status: DISCONTINUED | OUTPATIENT
Start: 2024-04-02 | End: 2024-04-05

## 2024-04-02 RX ORDER — DIVALPROEX SODIUM 500 MG/1
250 TABLET, DELAYED RELEASE ORAL
Refills: 0 | Status: DISCONTINUED | OUTPATIENT
Start: 2024-04-02 | End: 2024-04-05

## 2024-04-02 RX ORDER — LANOLIN ALCOHOL/MO/W.PET/CERES
3 CREAM (GRAM) TOPICAL AT BEDTIME
Refills: 0 | Status: DISCONTINUED | OUTPATIENT
Start: 2024-04-02 | End: 2024-04-05

## 2024-04-02 RX ORDER — SODIUM,POTASSIUM PHOSPHATES 278-250MG
1 POWDER IN PACKET (EA) ORAL ONCE
Refills: 0 | Status: COMPLETED | OUTPATIENT
Start: 2024-04-02 | End: 2024-04-02

## 2024-04-02 RX ORDER — METOPROLOL TARTRATE 50 MG
25 TABLET ORAL
Refills: 0 | Status: DISCONTINUED | OUTPATIENT
Start: 2024-04-02 | End: 2024-04-05

## 2024-04-02 RX ORDER — ASPIRIN/CALCIUM CARB/MAGNESIUM 324 MG
81 TABLET ORAL DAILY
Refills: 0 | Status: DISCONTINUED | OUTPATIENT
Start: 2024-04-02 | End: 2024-04-05

## 2024-04-02 RX ADMIN — ATORVASTATIN CALCIUM 20 MILLIGRAM(S): 80 TABLET, FILM COATED ORAL at 22:50

## 2024-04-02 RX ADMIN — Medication 40 MILLIEQUIVALENT(S): at 17:29

## 2024-04-02 RX ADMIN — DORZOLAMIDE HYDROCHLORIDE 1 DROP(S): 20 SOLUTION/ DROPS OPHTHALMIC at 20:59

## 2024-04-02 RX ADMIN — DIVALPROEX SODIUM 250 MILLIGRAM(S): 500 TABLET, DELAYED RELEASE ORAL at 19:41

## 2024-04-02 RX ADMIN — Medication 25 MILLIGRAM(S): at 19:48

## 2024-04-02 RX ADMIN — Medication 1 PACKET(S): at 17:30

## 2024-04-02 RX ADMIN — Medication 81 MILLIGRAM(S): at 19:41

## 2024-04-02 NOTE — H&P ADULT - HISTORY OF PRESENT ILLNESS
talked to DaughterDaniel Triana [phone] over the phone    89 years old female with h/o HTN, HLD, dementia ( A&O x 1 baseline), focal seizure ( refused medications at home), was brought in to ED with increased confused with urinary incontinence. Patient had prior abnormal EEG in 2022 with evidence for increased risk for seizures from area of focal dysfunction in the left temporal region but she refused to take depakote at home. Has intermittent episodes of confusion but more frequent lately. No fever, chills, nausea, vomiting or diarrhea. No urinary symptoms  Hypertensive, afebrile, sat well at RA. No leukocytosis, plt 201, K 3.2, Cr 0.72, phosphorus 2, hsTnT 26.8. UA not suggestive of UTI. CT head with involutional changes and volume loss. No acute pathology. CXR with no focal consolidation'    SH: no toxic habits talked to DaughterDaniel Triana [phone] over the phone    89 years old female with h/o HTN, HLD, dementia ( A&O x 1-2 baseline), focal seizure ( refused medications at home), was brought in to ED with increased confused with urinary incontinence. Patient had prior abnormal EEG in 2022 with evidence for increased risk for seizures from area of focal dysfunction in the left temporal region but she refused to take depakote at home. Has intermittent episodes of confusion but more frequent lately. No fever, chills, nausea, vomiting or diarrhea. No urinary symptoms  Hypertensive, afebrile, sat well at RA. No leukocytosis, plt 201, K 3.2, Cr 0.72, phosphorus 2, hsTnT 26.8. UA not suggestive of UTI. CT head with involutional changes and volume loss. No acute pathology. CXR with no focal consolidation'    SH: no toxic habits

## 2024-04-02 NOTE — H&P ADULT - NSHPPHYSICALEXAM_GEN_ALL_CORE
CONSTITUTIONAL: alert but demented, no acute distress  EYES: PERRL, no scleral icterus  ENT: Mucosa moist, tongue normal  NECK: Neck supple, trachea midline, non-tender  CARDIAC: Normal S1 and S2. Regular rate and rhythms. No Pedal edema. Peripheral pulses intact  LUNGS: Equal air entry both lungs. No rales, rhonchi, wheezing. Normal respiratory effort.   ABDOMEN: Soft, nondistended, nontender. No guarding or rebound tenderness. No hepatomegaly or splenomegaly. Bowel sound normal.   MUSCULOSKELETAL: Normocephalic, atraumatic.  No significant deformity or joint abnormality  NEUROLOGICAL: Move all extremities  SKIN: no lesions or eruptions. Normal turgor  PSYCHIATRIC: A&O x 1, demented

## 2024-04-02 NOTE — H&P ADULT - PROBLEM SELECTOR PLAN 2
Low K and low phosphorus  replace with oral KCL and potassium phosphate  Monitor and replace serum electrolytes as needed

## 2024-04-02 NOTE — ED ADULT NURSE NOTE - OBJECTIVE STATEMENT
as per patient's daughter " baseline dementia, worsen confusion since yesterday, urinated on the couch today, frequent urinating, and fell yesterday she fell off the couch and hit the left side of face, unknown LOC, (Noted abrasion to left facial cheek). "

## 2024-04-02 NOTE — H&P ADULT - ASSESSMENT
89 years old female with h/o HTN, HLD, dementia ( A&O x 1 baseline), focal seizure ( refused medications at home), was brought in to ED with increased confused with urinary incontinence. Patient had prior abnormal EEG in 2022 with evidence for increased risk for seizures from area of focal dysfunction in the left temporal region but she refused to take depakote at home. Has intermittent episodes of confusion but more frequent lately. No fever, chills, nausea, vomiting or diarrhea. No urinary symptoms  Hypertensive, afebrile, sat well at RA. No leukocytosis, plt 201, K 3.2, Cr 0.72, phosphorus 2, hsTnT 26.8. UA not suggestive of UTI. CT head with involutional changes and volume loss. No acute pathology. CXR with no focal consolidation 89 years old female with h/o HTN, HLD, dementia ( A&O x 1-2 baseline), focal seizure ( refused medications at home), was brought in to ED with increased confused with urinary incontinence. Patient had prior abnormal EEG in 2022 with evidence for increased risk for seizures from area of focal dysfunction in the left temporal region but she refused to take depakote at home. Has intermittent episodes of confusion but more frequent lately. No fever, chills, nausea, vomiting or diarrhea. No urinary symptoms  Hypertensive, afebrile, sat well at RA. No leukocytosis, plt 201, K 3.2, Cr 0.72, phosphorus 2, hsTnT 26.8. UA not suggestive of UTI. CT head with involutional changes and volume loss. No acute pathology. CXR with no focal consolidation

## 2024-04-02 NOTE — ED ADULT TRIAGE NOTE - CHIEF COMPLAINT QUOTE
BIBA from home for increased confusion and incontinence since yesterday, patient is alert and oriented x 2 in triage emt fs 193  hx of dementia

## 2024-04-02 NOTE — ED ADULT NURSE REASSESSMENT NOTE - NS ED NURSE REASSESS COMMENT FT1
Received report from RNs Alec and Holli. Identified pt and introduced myself as RN. Pt is awake, a&ox2. IV access patent. Cardiac monitor maintained.

## 2024-04-02 NOTE — ED PROVIDER NOTE - CLINICAL SUMMARY MEDICAL DECISION MAKING FREE TEXT BOX
89 y/o female with htn, high chol, seizure, dementia here with increase  confusion and urinary incontinence/bowel incontinence since yesterday. Vs stable.   Will R/o infection or possible seizure.   Will obtain basic labs, ct head cxr, ekg and likely admit for increase confusion and possible short term rehab.     labs reviewed and k 3.2 will replete. ct head no acute findings   cxr no pna.   Will admit to the hospital for increase confusion

## 2024-04-02 NOTE — H&P ADULT - NSHPADDITIONALINFOADULT_GEN_ALL_CORE
talked with daughter- Kamilah ( [phone]) who stated that she is POA for patient.  Discussed about GOC. Daughter stated that she will have to discuss with her brother. Palliative care consulted for GOC discussion

## 2024-04-02 NOTE — ED ADULT NURSE NOTE - ED STAT RN HANDOFF DETAILS
Report endorsed to Nataliya Wynne RN. Safety checks compld this shift.  IV sites checked and remains WDL. Meds given as ord with no s/s of adverse RXNs. Fall +skin precs in place. Any issues endorsed to oncoming RN for follow up.

## 2024-04-02 NOTE — ED PROVIDER NOTE - OBJECTIVE STATEMENT
89 y/o female with htn, high chol, dementia, seizure brought in by daughter for increase confusion today and urinary/bowel incontinence since yesterday. Daughter states unsure if pt had seizure but had similar symptoms last nov and was seen here and found to have seizures. Pt was given seizure medications but refuse to take it. Pt currently denies any pain. Daughter states she also bumped her head yesterday. Denies LOC> Pt is not on any blood thinners. Pt currently denies any pain. Pt lives alone with HHA. 89 y/o female with htn, high chol, dementia, seizure, legally blind brought in by daughter for increase confusion today and urinary/bowel incontinence since yesterday. Daughter states unsure if pt had seizure but had similar symptoms last nov and was seen here and found to have seizures. Pt was given seizure medications but refuse to take it. Pt currently denies any pain. Daughter states she also bumped her head yesterday. Denies LOC> Pt is not on any blood thinners. Pt currently denies any pain. Pt lives alone with A.

## 2024-04-02 NOTE — ED PROVIDER NOTE - PHYSICAL EXAMINATION
GEN: Awake, alert, interactive, NAD.  HEAD AND NECK: NC/AT. Airway patent. Neck supple.   EYES:  Clear b/l.   ENT: Moist mucus membranes.   CARDIAC: Regular rate, regular rhythm. No evident pedal edema.    RESP/CHEST: Normal respiratory effort with no use of accessory muscles or retractions. Clear throughout on auscultation.  ABD: soft, non-distended, non-tender. No rebound, no guarding.   BACK: No midline spinal TTP. No CVAT.   EXTREMITIES: Moving all extremities with no apparent deformities.   SKIN: Warm, dry, intact normal color. No rash.   NEURO: AOx2, CN II-XII grossly intact, no focal deficits.   PSYCH: Appropriate mood and affect.

## 2024-04-02 NOTE — H&P ADULT - PROBLEM SELECTOR PLAN 1
brought in with episodes of confusion with urinary incontinence  CT head  ( I personally review) with involutional changes and volume loss. No acute pathology. CXR with no focal consolidation  Prior EEG with evidence for increased risk for seizures from area of focal dysfunction in the left temporal region. Seen by neurology, prescribed Depakote but patient refused to take it at home  suspect due to seizure  Check EEG, prolactin, seizure precaution  Resume Depakote   Patient is following with neurology Dr Subha Medellin outpatient

## 2024-04-02 NOTE — H&P ADULT - NSVTERISKREFERASSESS_GEN_ALL_CORE
Quality 130: Documentation Of Current Medications In The Medical Record: Current Medications Documented Detail Level: Detailed Quality 431: Preventive Care And Screening: Unhealthy Alcohol Use - Screening: Patient screened for unhealthy alcohol use using a single question and scores less than 2 times per year Quality 226: Preventive Care And Screening: Tobacco Use: Screening And Cessation Intervention: Patient screened for tobacco use and is an ex/non-smoker Refer to the Assessment tab to view/cancel completed assessment.

## 2024-04-02 NOTE — ED ADULT NURSE NOTE - NSFALLRISKINTERV_ED_ALL_ED

## 2024-04-02 NOTE — ED ADULT TRIAGE NOTE - NS ED TRIAGE AVPU SCALE
Alert-The patient is alert, awake and responds to voice. The patient is oriented to time, place, and person. The triage nurse is able to obtain subjective information. EMT/paramedic

## 2024-04-03 DIAGNOSIS — R53.1 WEAKNESS: ICD-10-CM

## 2024-04-03 DIAGNOSIS — Z51.5 ENCOUNTER FOR PALLIATIVE CARE: ICD-10-CM

## 2024-04-03 DIAGNOSIS — H54.8 LEGAL BLINDNESS, AS DEFINED IN USA: ICD-10-CM

## 2024-04-03 DIAGNOSIS — R56.9 UNSPECIFIED CONVULSIONS: ICD-10-CM

## 2024-04-03 LAB
A1C WITH ESTIMATED AVERAGE GLUCOSE RESULT: 6.2 % — HIGH (ref 4–5.6)
ALBUMIN SERPL ELPH-MCNC: 3.1 G/DL — LOW (ref 3.3–5)
ALP SERPL-CCNC: 86 U/L — SIGNIFICANT CHANGE UP (ref 40–120)
ALT FLD-CCNC: 28 U/L — SIGNIFICANT CHANGE UP (ref 12–78)
ANION GAP SERPL CALC-SCNC: 9 MMOL/L — SIGNIFICANT CHANGE UP (ref 5–17)
AST SERPL-CCNC: 41 U/L — HIGH (ref 15–37)
BILIRUB SERPL-MCNC: 0.7 MG/DL — SIGNIFICANT CHANGE UP (ref 0.2–1.2)
BUN SERPL-MCNC: 13 MG/DL — SIGNIFICANT CHANGE UP (ref 7–23)
CALCIUM SERPL-MCNC: 9.1 MG/DL — SIGNIFICANT CHANGE UP (ref 8.5–10.1)
CHLORIDE SERPL-SCNC: 107 MMOL/L — SIGNIFICANT CHANGE UP (ref 96–108)
CHOLEST SERPL-MCNC: 210 MG/DL — HIGH
CO2 SERPL-SCNC: 26 MMOL/L — SIGNIFICANT CHANGE UP (ref 22–31)
CREAT SERPL-MCNC: 0.75 MG/DL — SIGNIFICANT CHANGE UP (ref 0.5–1.3)
EGFR: 75 ML/MIN/1.73M2 — SIGNIFICANT CHANGE UP
ESTIMATED AVERAGE GLUCOSE: 131 MG/DL — HIGH (ref 68–114)
GLUCOSE SERPL-MCNC: 106 MG/DL — HIGH (ref 70–99)
HCT VFR BLD CALC: 35.6 % — SIGNIFICANT CHANGE UP (ref 34.5–45)
HDLC SERPL-MCNC: 88 MG/DL — SIGNIFICANT CHANGE UP
HGB BLD-MCNC: 12 G/DL — SIGNIFICANT CHANGE UP (ref 11.5–15.5)
LIPID PNL WITH DIRECT LDL SERPL: 110 MG/DL — HIGH
MAGNESIUM SERPL-MCNC: 2.2 MG/DL — SIGNIFICANT CHANGE UP (ref 1.6–2.6)
MCHC RBC-ENTMCNC: 31.1 PG — SIGNIFICANT CHANGE UP (ref 27–34)
MCHC RBC-ENTMCNC: 33.7 G/DL — SIGNIFICANT CHANGE UP (ref 32–36)
MCV RBC AUTO: 92.2 FL — SIGNIFICANT CHANGE UP (ref 80–100)
NON HDL CHOLESTEROL: 122 MG/DL — SIGNIFICANT CHANGE UP
NRBC # BLD: 0 /100 WBCS — SIGNIFICANT CHANGE UP (ref 0–0)
PHOSPHATE SERPL-MCNC: 2.8 MG/DL — SIGNIFICANT CHANGE UP (ref 2.5–4.5)
PLATELET # BLD AUTO: 216 K/UL — SIGNIFICANT CHANGE UP (ref 150–400)
POTASSIUM SERPL-MCNC: 3.2 MMOL/L — LOW (ref 3.5–5.3)
POTASSIUM SERPL-SCNC: 3.2 MMOL/L — LOW (ref 3.5–5.3)
PROLACTIN SERPL-MCNC: 47.7 NG/ML — HIGH (ref 3.4–24.1)
PROT SERPL-MCNC: 7.9 GM/DL — SIGNIFICANT CHANGE UP (ref 6–8.3)
RBC # BLD: 3.86 M/UL — SIGNIFICANT CHANGE UP (ref 3.8–5.2)
RBC # FLD: 13.9 % — SIGNIFICANT CHANGE UP (ref 10.3–14.5)
SODIUM SERPL-SCNC: 142 MMOL/L — SIGNIFICANT CHANGE UP (ref 135–145)
TRIGL SERPL-MCNC: 69 MG/DL — SIGNIFICANT CHANGE UP
WBC # BLD: 5.37 K/UL — SIGNIFICANT CHANGE UP (ref 3.8–10.5)
WBC # FLD AUTO: 5.37 K/UL — SIGNIFICANT CHANGE UP (ref 3.8–10.5)

## 2024-04-03 PROCEDURE — 95816 EEG AWAKE AND DROWSY: CPT | Mod: 26

## 2024-04-03 PROCEDURE — 99497 ADVNCD CARE PLAN 30 MIN: CPT | Mod: 25

## 2024-04-03 PROCEDURE — 99223 1ST HOSP IP/OBS HIGH 75: CPT

## 2024-04-03 PROCEDURE — 99233 SBSQ HOSP IP/OBS HIGH 50: CPT

## 2024-04-03 RX ORDER — POTASSIUM CHLORIDE 20 MEQ
40 PACKET (EA) ORAL ONCE
Refills: 0 | Status: COMPLETED | OUTPATIENT
Start: 2024-04-03 | End: 2024-04-03

## 2024-04-03 RX ADMIN — Medication 40 MILLIEQUIVALENT(S): at 17:52

## 2024-04-03 RX ADMIN — Medication 25 MILLIGRAM(S): at 05:38

## 2024-04-03 RX ADMIN — Medication 25 MILLIGRAM(S): at 17:41

## 2024-04-03 RX ADMIN — DIVALPROEX SODIUM 250 MILLIGRAM(S): 500 TABLET, DELAYED RELEASE ORAL at 18:26

## 2024-04-03 RX ADMIN — AMLODIPINE BESYLATE 10 MILLIGRAM(S): 2.5 TABLET ORAL at 05:39

## 2024-04-03 RX ADMIN — ATORVASTATIN CALCIUM 20 MILLIGRAM(S): 80 TABLET, FILM COATED ORAL at 21:12

## 2024-04-03 RX ADMIN — Medication 81 MILLIGRAM(S): at 11:27

## 2024-04-03 RX ADMIN — DIVALPROEX SODIUM 250 MILLIGRAM(S): 500 TABLET, DELAYED RELEASE ORAL at 05:39

## 2024-04-03 RX ADMIN — DORZOLAMIDE HYDROCHLORIDE 1 DROP(S): 20 SOLUTION/ DROPS OPHTHALMIC at 21:12

## 2024-04-03 NOTE — PATIENT PROFILE ADULT - FALL HARM RISK - HARM RISK INTERVENTIONS
Assistance with ambulation/Assistance OOB with selected safe patient handling equipment/Communicate Risk of Fall with Harm to all staff/Discuss with provider need for PT consult/Monitor gait and stability/Provide patient with walking aids - walker, cane, crutches/Reinforce activity limits and safety measures with patient and family/Tailored Fall Risk Interventions/Use of alarms - bed, chair and/or voice tab/Visual Cue: Yellow wristband and red socks/Bed in lowest position, wheels locked, appropriate side rails in place/Call bell, personal items and telephone in reach/Instruct patient to call for assistance before getting out of bed or chair/Non-slip footwear when patient is out of bed/[place] to call system/Physically safe environment - no spills, clutter or unnecessary equipment/Purposeful Proactive Rounding/Room/bathroom lighting operational, light cord in reach

## 2024-04-03 NOTE — PROGRESS NOTE ADULT - SUBJECTIVE AND OBJECTIVE BOX
Patient is a 89y old  Female who presents with a chief complaint of AMS- suspect focal seizure (03 Apr 2024 14:09)    INTERVAL HPI/OVERNIGHT EVENTS: Patients seen and examined at bedside this morning. No acute events overnight. Pt reports she's feeling alot better. Seen with daughter at bedside.     MEDICATIONS  (STANDING):  amLODIPine   Tablet 10 milliGRAM(s) Oral daily  aspirin  chewable 81 milliGRAM(s) Oral daily  atorvastatin 20 milliGRAM(s) Oral at bedtime  divalproex  milliGRAM(s) Oral two times a day  dorzolamide 2% Ophthalmic Solution 1 Drop(s) Both EYES <User Schedule>  metoprolol tartrate 25 milliGRAM(s) Oral two times a day    MEDICATIONS  (PRN):  acetaminophen     Tablet .. 650 milliGRAM(s) Oral every 6 hours PRN Mild Pain (1 - 3), Moderate Pain (4 - 6)  melatonin 3 milliGRAM(s) Oral at bedtime PRN Insomnia    Allergies    penicillin (Hives)    Intolerances    Tolerated CTX 11/2023 (Other)    REVIEW OF SYSTEMS:  All other systems reviewed and are negative    Vital Signs Last 24 Hrs  T(C): 36.9 (03 Apr 2024 11:30), Max: 37.7 (03 Apr 2024 00:53)  T(F): 98.5 (03 Apr 2024 11:30), Max: 99.8 (03 Apr 2024 00:53)  HR: 81 (03 Apr 2024 11:30) (67 - 96)  BP: 149/85 (03 Apr 2024 11:30) (145/72 - 157/85)  BP(mean): --  RR: 15 (03 Apr 2024 11:30) (13 - 18)  SpO2: 99% (03 Apr 2024 11:30) (99% - 100%)    Parameters below as of 03 Apr 2024 00:53  Patient On (Oxygen Delivery Method): room air      Daily     Daily   I&O's Summary    03 Apr 2024 07:01  -  03 Apr 2024 16:06  --------------------------------------------------------  IN: 0 mL / OUT: 650 mL / NET: -650 mL      CAPILLARY BLOOD GLUCOSE        PHYSICAL EXAM:  CONSTITUTIONAL: alert but demented, no acute distress  EYES: PERRL, no scleral icterus  ENT: Mucosa moist, tongue normal  NECK: Neck supple, trachea midline, non-tender  CARDIAC: Normal S1 and S2. Regular rate and rhythms. No Pedal edema. Peripheral pulses intact  LUNGS: Equal air entry both lungs. No rales, rhonchi, wheezing. Normal respiratory effort.   ABDOMEN: Soft, nondistended, nontender. No guarding or rebound tenderness. No hepatomegaly or splenomegaly. Bowel sound normal.   MUSCULOSKELETAL: Normocephalic, atraumatic.  No significant deformity or joint abnormality  NEUROLOGICAL: Move all extremities  SKIN: no lesions or eruptions. Normal turgor  PSYCHIATRIC: A&O x 1, demented      Labs                          12.0   5.37  )-----------( 216      ( 03 Apr 2024 14:25 )             35.6     04-03    142  |  107  |  13  ----------------------------<  106<H>  3.2<L>   |  26  |  0.75    Ca    9.1      03 Apr 2024 14:25  Phos  2.8     04-03  Mg     2.2     04-03    TPro  7.9  /  Alb  3.1<L>  /  TBili  0.7  /  DBili  x   /  AST  41<H>  /  ALT  28  /  AlkPhos  86  04-03    PTT - ( 02 Apr 2024 14:51 )  PTT:16.6 sec      Urinalysis Basic - ( 03 Apr 2024 14:25 )    Color: x / Appearance: x / SG: x / pH: x  Gluc: 106 mg/dL / Ketone: x  / Bili: x / Urobili: x   Blood: x / Protein: x / Nitrite: x   Leuk Esterase: x / RBC: x / WBC x   Sq Epi: x / Non Sq Epi: x / Bacteria: x                      Radiology and Imaging reviewed.

## 2024-04-03 NOTE — PATIENT PROFILE ADULT - NSPROHMSYMPCOND_GEN_A_NUR
pt has been refusing to take seizure meds and only agrees to take aspirin and vitamins/cardiovascular

## 2024-04-03 NOTE — CONSULT NOTE ADULT - PROBLEM SELECTOR RECOMMENDATION 9
suspect AMS related to seizure, family reports double incontinence, resolution and closer to baseline now has had similar presention with seizures prior  CTH with no acute findings, EEG as above with Moderate diffuse/multi-focal cerebral dysfunction, not specific as to etiology.  There were no epileptiform abnormalities recorded.    h/o seizure disorder, follows with Dr. Tonie Calderon  on keppra at home, will stop taking medication for periods of time- says she does not recall having a seizure disorder, daughter said she has been told about this multiple times by different doctors   seizure precautions

## 2024-04-03 NOTE — CONSULT NOTE ADULT - PROBLEM SELECTOR RECOMMENDATION 2
h/o glaucoma, lives alone with limited number of HHA hours  h/o falls and remains at risk for further falls  assistance with ADLs especially in unfamiliar environment

## 2024-04-03 NOTE — EEG REPORT - NS EEG TEXT BOX
ORTIZ LYNCH [id number]     Study Date: 		04-03-24      --------------------------------------------------------------------------------------------------  History:  CC/ HPI Patient is a 89y old  Female who presents with a chief complaint of AMS- suspect focal seizure (02 Apr 2024 16:21)    MEDICATIONS  (STANDING):  amLODIPine   Tablet 10 milliGRAM(s) Oral daily  aspirin  chewable 81 milliGRAM(s) Oral daily  atorvastatin 20 milliGRAM(s) Oral at bedtime  divalproex  milliGRAM(s) Oral two times a day  dorzolamide 2% Ophthalmic Solution 1 Drop(s) Both EYES <User Schedule>  metoprolol tartrate 25 milliGRAM(s) Oral two times a day    --------------------------------------------------------------------------------------------------  Study Interpretation:    [[[Abbreviation Key:  PDR=alpha rhythm/posterior dominant rhythm. A-P=anterior posterior gradient.  Amplitude: ‘very low’:<20; ‘low’:20-50; ‘medium’:; ‘high’:>200uV.  Persistence for periodic/rhythmic patterns (% of epoch) ‘rare’:<1%; ‘occasional’:1-10%; ‘frequent’:10-50%; ‘abundant’:50-90%; ‘continuous’:>90%.  Persistence for sporadic discharges: ‘rare’:<1/hr; ‘occasional’:1/min-1/hr; ‘frequent’:>1/min; ‘abundant’:>1/10 sec.  GRDA=generalized rhythmic delta activity; FIRDA=frontal intermittent GRDA; LRDA=lateralized rhythmic delta activity; TIRDA=temporal intermittent rhythmic delta activity;  LPD=PLED=lateralized periodic discharges; GPD=generalized periodic discharges; BiPDs=BiPLEDs=bilateral independent periodic epileptiform discharges; SIRPID=stimulus induced rhythmic, periodic, or ictal appearing discharges; BIRDs=brief potentially ictal rhythmic discharges >4 Hz, lasting .5-10s; PFA=paroxysmal bursts of beta/gamma; LVFA=low voltage fast activity.  Modifiers: +F=with fast component; +S=with spike component; +R=with rhythmic component.  S-B=burst suppression pattern.  Max=maximal. N1-drowsy; N2-stage II sleep; N3-slow wave sleep. SSS/BETS=small sharp spikes/benign epileptiform transients of sleep. HV=hyperventilation; PS=photic stimulation]]]    FINDINGS:      Background:  Continuity: continuous  Symmetry: symmetric  PDR: 7 Hz activity, with amplitude to 40 uV, that attenuated to eye opening.    Reactivity: present  Voltage: normal (between 20-150uV)  Anterior Posterior Gradient: present  Other background findings: none  Breach: absent    Background Slowing:  Generalized slowing: diffuse irregular shifting delta and theta activity.  Focal slowing: none was present.    State Changes:   -Drowsiness noted with increased slowing, attenuation of fast activity, vertex transients.  -N2 sleep transients were not recorded.    Sporadic Epileptiform Discharges:    None    Rhythmic and Periodic Patterns (RPPs):  None     Electrographic and Electroclinical seizures:  None    Other Clinical Events:  None    Activation Procedures:   -Hyperventilation was not performed.    -Photic stimulation was performed and did not elicit any abnormalities.       Artifacts:  Intermittent myogenic and movement artifacts were noted.  Mildly technically limited, suspect lead swap error at T7    ECG:  The heart rate on single channel ECG was predominantly between 60-70BPM.    EEG Classification / Summary:  Abnormal EEG study  Moderate generalized background slowing    -----------------------------------------------------------------------------------------------------    Clinical Impression:  Moderate diffuse/multi-focal cerebral dysfunction, not specific as to etiology.  There were no epileptiform abnormalities recorded.    Mildly technically limited, suspect lead swap error at T7.  Repeat study if clinically indicated.    -------------------------------------------------------------------------------------------------------  [facility] EEG Reading Room Ph#: [phone]  Epilepsy Answering Service after 5PM and before 8:30AM: Ph#: [phone]    Chalino Serrano M.D.   of Neurology, [facility]

## 2024-04-03 NOTE — CONSULT NOTE ADULT - PROBLEM SELECTOR RECOMMENDATION 4
See GOC above.  Patient with no HCP, has 3 children, all involved in care.  Patient with limited HHA support, needs more help.  Family said patient has not been agreeable for [facility] or NH placement even as a bridge to securing more help at home.  No decisions regarding GOC, patient unwilling to name a decision maker if needed.  Encouraged further conversation regarding GOC.  Palliative to follow as needed.      Message sent to Dr. Estes

## 2024-04-03 NOTE — CONSULT NOTE ADULT - PROBLEM SELECTOR RECOMMENDATION 3
assistance with ADLs  legally blind has had h/o fall Patient/Caregiver provided printed discharge information.

## 2024-04-03 NOTE — PROGRESS NOTE ADULT - ASSESSMENT
89 years old female with h/o HTN, HLD, dementia ( A&O x 1-2 baseline), focal seizure ( refused medications at home), was brought in to ED with increased confused with urinary incontinence. Patient had prior abnormal EEG in 2022 with evidence for increased risk for seizures from area of focal dysfunction in the left temporal region but she refused to take depakote at home. Has intermittent episodes of confusion but more frequent lately. No fever, chills, nausea, vomiting or diarrhea. No urinary symptoms  Hypertensive, afebrile, sat well at [facility]. No leukocytosis, plt 201, K 3.2, Cr 0.72, phosphorus 2, hsTnT 26.8. UA not suggestive of UTI. CT head with involutional changes and volume loss. No acute pathology. CXR with no focal consolidation      Problem/Plan - 1:  ·  Problem: AMS (altered mental status).   ·  Plan: brought in with episodes of confusion with urinary incontinence  CT head  ( I personally review) with involutional changes and volume loss. No acute pathology. CXR with no focal consolidation  Prior EEG with evidence for increased risk for seizures from area of focal dysfunction in the left temporal region. Seen by neurology, prescribed Depakote but patient refused to take it at home  suspect due to seizure  Check EEG, prolactin, seizure precaution  Resume Depakote DR  Patient is following with neurology Dr Subha Medellin outpatient.  (4/3) restarted meds. If stable then will discharge home kira. Stressed compliance     Problem/Plan - 2:  ·  Problem: Disorder of electrolytes.   ·  Plan: Low K and low phosphorus  replace with oral KCL and potassium phosphate  Monitor and replace serum electrolytes as needed.    Problem/Plan - 3:  ·  Problem: Benign essential HTN.   ·  Plan: hypertensive, due to not taking BP med  Resume BP med  monitor BP and titrate BP med.    Problem/Plan - 4:  ·  Problem: Hyperlipidemia, unspecified.   ·  Plan: resume statin.    Problem/Plan - 5:  ·  Problem: Dementia.   ·  Plan: supportive care, at risk for delirium.

## 2024-04-03 NOTE — CONSULT NOTE ADULT - CONVERSATION DETAILS
Spoke with patient at bedside along with her daughter and son-in-law.  Patient has 3 children.  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  Patient forgetful, unable to recall why she came to the hospital.  Patient lives at home with small number of HHA hours that are for overnight hours.  Family helps supplement as able, but said she does need more help than what she currently has.  Family said patient just had an eval for an increase in HHA hours and they are waiting to hear back.  Patient has been living in her home for 20 years, she is blind and has fallen prior and is not always compliant with medications and has had seizures as a result prior.  When discussed seizure disorder and use of antiseizure meds to reduce seizure risk she said she was not made aware of this, but daughter said she has been told this by different neurologists and her PMD.  Patient's family has tried looking at other options such as rehab, but said patient is adamant about returning home.       Asked about who would help with medical decision making and patient would not say, said "I'm going to live forever".  Patient's daughter added that she does not like discussing these things, but she and her 2 siblings are all on the same page and regularly touch base in regard to their mother's care.  Spoke with patient's daughter separately regarding LST such as CPR and she said her mother does not discuss those things.  Explained importance of continuing to discuss these matters with her siblings as a time will come they need to know what they would want her care to look like.  Further, while no one can make her take her medications, failure to do so may increase her risk of requiring hospitalization and with each hospitalization it will take more out of her. No decisions at this time.

## 2024-04-04 LAB
ALBUMIN SERPL ELPH-MCNC: 2.8 G/DL — LOW (ref 3.3–5)
ALP SERPL-CCNC: 76 U/L — SIGNIFICANT CHANGE UP (ref 40–120)
ALT FLD-CCNC: 29 U/L — SIGNIFICANT CHANGE UP (ref 12–78)
ANION GAP SERPL CALC-SCNC: 9 MMOL/L — SIGNIFICANT CHANGE UP (ref 5–17)
AST SERPL-CCNC: 44 U/L — HIGH (ref 15–37)
BILIRUB SERPL-MCNC: 0.5 MG/DL — SIGNIFICANT CHANGE UP (ref 0.2–1.2)
BUN SERPL-MCNC: 22 MG/DL — SIGNIFICANT CHANGE UP (ref 7–23)
CALCIUM SERPL-MCNC: 8.7 MG/DL — SIGNIFICANT CHANGE UP (ref 8.5–10.1)
CHLORIDE SERPL-SCNC: 109 MMOL/L — HIGH (ref 96–108)
CO2 SERPL-SCNC: 23 MMOL/L — SIGNIFICANT CHANGE UP (ref 22–31)
CREAT SERPL-MCNC: 0.77 MG/DL — SIGNIFICANT CHANGE UP (ref 0.5–1.3)
CULTURE RESULTS: ABNORMAL
EGFR: 72 ML/MIN/1.73M2 — SIGNIFICANT CHANGE UP
GLUCOSE SERPL-MCNC: 121 MG/DL — HIGH (ref 70–99)
HCT VFR BLD CALC: 34.1 % — LOW (ref 34.5–45)
HGB BLD-MCNC: 11.3 G/DL — LOW (ref 11.5–15.5)
MAGNESIUM SERPL-MCNC: 2.1 MG/DL — SIGNIFICANT CHANGE UP (ref 1.6–2.6)
MCHC RBC-ENTMCNC: 30.9 PG — SIGNIFICANT CHANGE UP (ref 27–34)
MCHC RBC-ENTMCNC: 33.1 G/DL — SIGNIFICANT CHANGE UP (ref 32–36)
MCV RBC AUTO: 93.2 FL — SIGNIFICANT CHANGE UP (ref 80–100)
NRBC # BLD: 0 /100 WBCS — SIGNIFICANT CHANGE UP (ref 0–0)
PHOSPHATE SERPL-MCNC: 2.7 MG/DL — SIGNIFICANT CHANGE UP (ref 2.5–4.5)
PLATELET # BLD AUTO: 199 K/UL — SIGNIFICANT CHANGE UP (ref 150–400)
POTASSIUM SERPL-MCNC: 3.8 MMOL/L — SIGNIFICANT CHANGE UP (ref 3.5–5.3)
POTASSIUM SERPL-SCNC: 3.8 MMOL/L — SIGNIFICANT CHANGE UP (ref 3.5–5.3)
PROT SERPL-MCNC: 7.4 GM/DL — SIGNIFICANT CHANGE UP (ref 6–8.3)
RBC # BLD: 3.66 M/UL — LOW (ref 3.8–5.2)
RBC # FLD: 13.8 % — SIGNIFICANT CHANGE UP (ref 10.3–14.5)
SODIUM SERPL-SCNC: 141 MMOL/L — SIGNIFICANT CHANGE UP (ref 135–145)
SPECIMEN SOURCE: SIGNIFICANT CHANGE UP
WBC # BLD: 4.94 K/UL — SIGNIFICANT CHANGE UP (ref 3.8–10.5)
WBC # FLD AUTO: 4.94 K/UL — SIGNIFICANT CHANGE UP (ref 3.8–10.5)

## 2024-04-04 PROCEDURE — 99232 SBSQ HOSP IP/OBS MODERATE 35: CPT

## 2024-04-04 RX ADMIN — DIVALPROEX SODIUM 250 MILLIGRAM(S): 500 TABLET, DELAYED RELEASE ORAL at 05:32

## 2024-04-04 RX ADMIN — Medication 25 MILLIGRAM(S): at 17:13

## 2024-04-04 RX ADMIN — DORZOLAMIDE HYDROCHLORIDE 1 DROP(S): 20 SOLUTION/ DROPS OPHTHALMIC at 08:55

## 2024-04-04 RX ADMIN — DORZOLAMIDE HYDROCHLORIDE 1 DROP(S): 20 SOLUTION/ DROPS OPHTHALMIC at 20:58

## 2024-04-04 RX ADMIN — AMLODIPINE BESYLATE 10 MILLIGRAM(S): 2.5 TABLET ORAL at 05:32

## 2024-04-04 RX ADMIN — Medication 25 MILLIGRAM(S): at 05:31

## 2024-04-04 RX ADMIN — ATORVASTATIN CALCIUM 20 MILLIGRAM(S): 80 TABLET, FILM COATED ORAL at 21:03

## 2024-04-04 RX ADMIN — Medication 81 MILLIGRAM(S): at 12:01

## 2024-04-04 RX ADMIN — DIVALPROEX SODIUM 250 MILLIGRAM(S): 500 TABLET, DELAYED RELEASE ORAL at 17:13

## 2024-04-04 NOTE — PHYSICAL THERAPY INITIAL EVALUATION ADULT - GENERAL OBSERVATIONS, REHAB EVAL
Pt found seated OOB in chair in NAD, +hep lock, +primafit, friend at bedside, agreeable to PT Yodit and RN Michel aware.

## 2024-04-04 NOTE — PHYSICAL THERAPY INITIAL EVALUATION ADULT - ADDITIONAL COMMENTS
Pt states she lives in a , ~2 [address]ULIE with HRs and 1 flight of steps to 2nd floor where bedroom is located, however states she recently stays on main level of house. Pt states she is independent with functional mobility holding onto furniture t/o the house, uses a cane outdoors. Pt states she has a HHA ~8 hours day 7 days/week who assists with ADL's.

## 2024-04-04 NOTE — CONSULT NOTE ADULT - ASSESSMENT
Probable seizure disorder  Left frontal midline meningioma  HTN  Glaucoma    - had side effects to Keppra. Recommend Depakote 250mg BID if patient agrees to take medication.  - location of meningioma likely source of seizure disorder  - follow up with Dr. Mera, neurologist, at [facility] once discharged  - discussed with family at bedside.  - will sign off. Please re-consult if needed.
89 year old female PMH of seizures, HTN, glaucoma and legally blind presented to the ED for AMS, slurred speech and incontinence.  Patient not consistent with her home medications.  Palliative care consulted for assistance with GOC.

## 2024-04-04 NOTE — PHYSICAL THERAPY INITIAL EVALUATION ADULT - PERTINENT HX OF CURRENT PROBLEM, REHAB EVAL
89 years old female with h/o HTN, HLD, dementia (A&O x 1-2 baseline), focal seizure ( refused medications at home), was brought in to ED with increased confused with urinary incontinence. Patient had prior abnormal EEG in 2022 with evidence for increased risk for seizures from area of focal dysfunction in the left temporal region but she refused to take depakote at home. Has intermittent episodes of confusion but more frequent lately. No fever, chills, nausea, vomiting or diarrhea. No urinary symptoms.

## 2024-04-04 NOTE — PROGRESS NOTE ADULT - ASSESSMENT
89 years old female with h/o HTN, HLD, dementia ( A&O x 1-2 baseline), focal seizure ( refused medications at home), was brought in to ED with increased confused with urinary incontinence. Patient had prior abnormal EEG in 2022 with evidence for increased risk for seizures from area of focal dysfunction in the left temporal region but she refused to take depakote at home. Has intermittent episodes of confusion but more frequent lately. No fever, chills, nausea, vomiting or diarrhea. No urinary symptoms  Hypertensive, afebrile, sat well at [facility]. No leukocytosis, plt 201, K 3.2, Cr 0.72, phosphorus 2, hsTnT 26.8. UA not suggestive of UTI. CT head with involutional changes and volume loss. No acute pathology. CXR with no focal consolidation      Problem/Plan - 1:  ·  Problem: AMS (altered mental status).   ·  Plan: brought in with episodes of confusion with urinary incontinence  CT head  ( I personally review) with involutional changes and volume loss. No acute pathology. CXR with no focal consolidation  Prior EEG with evidence for increased risk for seizures from area of focal dysfunction in the left temporal region. Seen by neurology, prescribed Depakote but patient refused to take it at home  suspect due to seizure  Resume Depakote DR  Patient is following with neurology Dr Subha Velasco outpatient.  neuro c/s placed    Problem/Plan - 2:  ·  Problem: Disorder of electrolytes.   ·  Plan: Low K and low phosphorus  replace with oral KCL and potassium phosphate  Monitor and replace serum electrolytes as needed.    Problem/Plan - 3:  ·  Problem: Benign essential HTN.   ·  Plan: hypertensive, due to not taking BP med  Resume BP med  monitor BP and titrate BP med.    Problem/Plan - 4:  ·  Problem: Hyperlipidemia, unspecified.   ·  Plan: resume statin.    Problem/Plan - 5:  ·  Problem: Dementia.   ·  Plan: supportive care, at risk for delirium.    Dispo- Pending safe home discharge plan as patient lives home alone and is requesting more aide hours.

## 2024-04-04 NOTE — CONSULT NOTE ADULT - SUBJECTIVE AND OBJECTIVE BOX
HPI: 89 year old woman with hx of HTN, Glaucoma, seizure disorder, and blindness presenting with confusion and urinary incontinence. She had similar episodes in 10/2022, 3/2023, and 11/2023. She was diagnosed with seizure disorder in 10/2022 and started on Keppra 250mg BID. She followed up with Dr. Mera (neurologist in [facility]) and refused to take medications. She lives alone and has a HHA 4-5 days per week. She saw me in the office in February 2024. She says she does not believe in medication. She refused to take medication. No further follow up was made.    PMHx: HTN, Glaucoma, seizure disorder, blind  PSHx: none  PFHx: none  Social Hx: non-smoker, social etoh, no illicit drug use  Allergies: PCN  ROS: AMS  Medications: see EMR    Vitals: Temp 98.8F      RR 18       HR 72      /77  General: NAD  CV: regular rate and rhythm  Resp: no respiratory distress  Neck: supple  Neuro Exam: AOx2. States the month is April and date is 16th. Follows commands. No dysarthria. No aphasia. EOM intact. No facial droop. Blind. Tongue is midline. Palate elevate symmetrically. Shoulder shrug is intact. Finger to nose and heel to shin intact. Moving all four extremities. No focal weakness. Reflexes symmetric and toes down. Gait exam deferred. Sensory intact to touch.     NIHSS- 4    CT head and labs reviewed 
HPI:  talked to DaughterDaniel Triana [phone] over the phone    89 years old female with h/o HTN, HLD, dementia ( A&O x 1-2 baseline), focal seizure ( refused medications at home), was brought in to ED with increased confused with urinary incontinence. Patient had prior abnormal EEG in 2022 with evidence for increased risk for seizures from area of focal dysfunction in the left temporal region but she refused to take depakote at home. Has intermittent episodes of confusion but more frequent lately. No fever, chills, nausea, vomiting or diarrhea. No urinary symptoms  Hypertensive, afebrile, sat well at [facility]. No leukocytosis, plt 201, K 3.2, Cr 0.72, phosphorus 2, hsTnT 26.8. UA not suggestive of UTI. CT head with involutional changes and volume loss. No acute pathology. CXR with no focal consolidation'    SH: no toxic habits (02 Apr 2024 16:21)    PERTINENT PM/SXH:   Benign essential HTN    Acute glaucoma    Glaucoma    Legal blindness    HTN (hypertension)    Seizure      No significant past surgical history      FAMILY HISTORY:    ITEMS NOT CHECKED ARE NOT PRESENT    SOCIAL HISTORY:   Significant other/partner: no  [ ]  Children: yes  [ ]  Quaker/Spirituality: Spiritism  Substance hx:  [ ]   Tobacco hx:  [ ]   Alcohol hx: [ ]   Home Opioid hx:  [ ] I-Stop Reference No:  Reference #: [id number]  Living Situation: [x ]Home  [ ]Long term care  [ ]Rehab [ ]Other    ADVANCE DIRECTIVES:    DNR  MOLST  [ ]  Living Will  [ ]   DECISION MAKER(s):  [ ] Health Care Proxy(s)  [ x] Surrogate(s)  [ ] Guardian           Name(s): Phone Number(s): Suri (daughter) [phone]    BASELINE (I)ADL(s) (prior to admission):  [place]: [ ]Total  [x ] Moderate [ ]Dependent    Allergies    penicillin (Hives)    Intolerances    Tolerated CTX 11/2023 (Other)  MEDICATIONS  (STANDING):  amLODIPine   Tablet 10 milliGRAM(s) Oral daily  aspirin  chewable 81 milliGRAM(s) Oral daily  atorvastatin 20 milliGRAM(s) Oral at bedtime  divalproex  milliGRAM(s) Oral two times a day  dorzolamide 2% Ophthalmic Solution 1 Drop(s) Both EYES <User Schedule>  metoprolol tartrate 25 milliGRAM(s) Oral two times a day    MEDICATIONS  (PRN):  acetaminophen     Tablet .. 650 milliGRAM(s) Oral every 6 hours PRN Mild Pain (1 - 3), Moderate Pain (4 - 6)  melatonin 3 milliGRAM(s) Oral at bedtime PRN Insomnia    PRESENT SYMPTOMS: [ ]Unable to obtain due to poor mentation   Source if other than patient:  [ ]Family   [ ]Team     Pain: [ ] yes [x ] no  QOL impact -   Location -                    Aggravating factors -  Quality -  Radiation -  Timing-  Severity (0-10 scale):  Minimal acceptable level (0-10 scale):     PAIN AD Score:     http://geriatrictoolkit.[email or web address]/painad.pdf (press ctrl +  left click to view)    Dyspnea:                           [ ]Mild [ ]Moderate [ ]Severe  Anxiety:                             [ ]Mild [ ]Moderate [ ]Severe  Fatigue:                             [ ]Mild [ ]Moderate [ ]Severe  Nausea:                             [ ]Mild [ ]Moderate [ ]Severe  Loss of appetite:              [ ]Mild [ ]Moderate [ ]Severe  Constipation:                    [ ]Mild [ ]Moderate [ ]Severe    Other Symptoms:  [x ]All other review of systems negative     Karnofsky Performance Score/Palliative Performance Status Version 2:         60-70%    http://npcrc.org/files/news/palliative_performance_scale_ppsv2.pdf  PHYSICAL EXAM:  Vital Signs Last 24 Hrs  T(C): 36.9 (03 Apr 2024 11:30), Max: 37.7 (03 Apr 2024 00:53)  T(F): 98.5 (03 Apr 2024 11:30), Max: 99.8 (03 Apr 2024 00:53)  HR: 81 (03 Apr 2024 11:30) (67 - 101)  BP: 149/85 (03 Apr 2024 11:30) (145/72 - 160/92)  BP(mean): --  RR: 15 (03 Apr 2024 11:30) (13 - 18)  SpO2: 99% (03 Apr 2024 11:30) (99% - 100%)    Parameters below as of 03 Apr 2024 00:53  Patient On (Oxygen Delivery Method): room air     I&O's Summary    03 Apr 2024 07:01  -  03 Apr 2024 14:14  --------------------------------------------------------  IN: 0 mL / OUT: 650 mL / NET: -650 mL    GENERAL:  [ x]Alert  [x ]Oriented x2-3   [ ]Lethargic  [ ]Cachexia  [ ]Unarousable  [x ]Verbal  [ ]Non-Verbal  Behavioral:   [ ] Anxiety  [ ] Delirium [ ] Agitation [ ] Other  HEENT:  [x ]Normal   [ ]Dry mouth   [ ]ET Tube/Trach  [ ]Oral lesions  PULMONARY:   [x ]Clear diminished at bases bilaterally [ ]Tachypnea  [ ]Audible excessive secretions   [ ]Rhonchi        [ ]Right [ ]Left [ ]Bilateral  [ ]Crackles        [ ]Right [ ]Left [ ]Bilateral  [ ]Wheezing     [ ]Right [ ]Left [ ]Bilateral  CARDIOVASCULAR:    [x ]Regular [ ]Irregular [ ]Tachy  [ ]Davin [ ]Murmur [ ]Other  GASTROINTESTINAL:  [x ]Soft  [ ]Distended   [ ]+BS  [x ]Non tender [ ]Tender  [ ]PEG [ ]OGT/ NGT  Last BM: prior to admission GENITOURINARY:  [ ]Normal [ x] Incontinent   [ ]Oliguria/Anuria   [ ]Tam  MUSCULOSKELETAL:   [ ]Normal   [ x]Weakness  [ ]Bed/Wheelchair bound [ ]Edema  NEUROLOGIC:   [ ]No focal deficits  [x ] Cognitive impairment forgetful [ ] Dysphagia [ ]Dysarthria [ ] Paresis [ ]Other   SKIN:   [ ]Normal   [ ]Pressure ulcer(s)  [ ]Rash    CRITICAL CARE:  [ ] Shock Present  [ ]Septic [ ]Cardiogenic [ ]Neurologic [ ]Hypovolemic  [ ]  Vasopressors [ ]  Inotropes   [ ] Respiratory failure present [ ] mechanical ventilation [ ] non-invasive ventilatory support [ ] High flow  [ ] Acute  [ ] Chronic [ ] Hypoxic  [ ] Hypercarbic [ ] Other  [ ] Other organ failure     LABS:                        11.4   8.87  )-----------( 201      ( 02 Apr 2024 14:51 )             33.1   04-02    138  |  104  |  11  ----------------------------<  134<H>  3.2<L>   |  27  |  0.72    Ca    8.9      02 Apr 2024 14:00  Phos  2.0     04-02  Mg     1.8     04-02    TPro  7.8  /  Alb  3.1<L>  /  TBili  0.4  /  DBili  x   /  AST  34  /  ALT  27  /  AlkPhos  88  04-02  PTT - ( 02 Apr 2024 14:51 )  PTT:16.6 sec    RADIOLOGY & ADDITIONAL STUDIES:   < from: Xray Chest 1 View-PORTABLE IMMEDIATE (Xray Chest 1 View-PORTABLE IMMEDIATE .) (04.02.24 @ 15:11) >  IMPRESSION: Increasing left lower lobe atelectasis with no infiltrates,   pleural effusions or CHF.  --- End of Report ---  < end of copied text >    < from: CT Head No Cont (04.02.24 @ 14:57) >  IMPRESSION:  1)  involutional change and volume loss, commensurate with age. No acute   infarct, hemorrhage, or space-occupying lesion. No hydrocephalus or   collections noted.  2)  clear sinuses and mastoids..  --- End of Report ---  < end of copied text >    < from: TTE Echo Complete w/o Contrast w/ Doppler (10.04.22 @ 10:01) >  Summary:   1. Left ventricular ejection fraction, by visual estimation, is 60 to   65%.   2. Normal global left ventricular systolic function.   3. Mildly increased LV wall thickness.   4. Normal left ventricular internal cavity size.   5. Spectral Doppler shows impaired relaxation pattern of left   ventricular myocardial filling (Grade I diastolic dysfunction).   6. Normal right ventricular size andfunction.   7. Normal left atrial size.   8. Normal right atrial size.   9. There is no evidence of pericardial effusion.  10. Mild thickening and calcification of the anterior and posterior   mitral valve leaflets.  11. Trace mitral valve regurgitation.  12. Mild aortic regurgitation.  13. Normal trileaflet aortic valve with normal opening.  14. Increased relative wall thickness with normal mass index consistent   with left ventricular concentric remodeling.  [phone] Austin Rainey MD FACC, FASE, FACP  Electronically signed on 10/4/2022 at 4:18:00 PM  < end of copied text >    Clinical Impression:  Moderate diffuse/multi-focal cerebral dysfunction, not specific as to etiology.  There were no epileptiform abnormalities recorded.    Mildly technically limited, suspect lead swap error at T7.  Repeat study if clinically indicated.  -------------------------------------------------------------------------------------------------------  [facility] EEG Reading Room Ph#: [phone]  Epilepsy Answering Service after 5PM and before 8:30AM: Ph#: [phone]  Chalino Serrano M.D.   of Neurology, [facility]	    PROTEIN CALORIE MALNUTRITION PRESENT: [ ] Yes [ ] No  [ ] PPSV2 < or = to 30% [ ] significant weight loss  [ ] poor nutritional intake [ ] catabolic state [ ] anasarca     Artificial Nutrition [ ]     REFERRALS:   [ ]Chaplaincy  [ ] Hospice  [ ]Child Life  [ ]Social Work  [ ]Case management [ ]Holistic Therapy     Goals of Care Document:

## 2024-04-05 ENCOUNTER — TRANSCRIPTION ENCOUNTER (OUTPATIENT)
Age: 89
End: 2024-04-05

## 2024-04-05 VITALS
TEMPERATURE: 98 F | HEART RATE: 76 BPM | OXYGEN SATURATION: 99 % | SYSTOLIC BLOOD PRESSURE: 111 MMHG | RESPIRATION RATE: 18 BRPM | DIASTOLIC BLOOD PRESSURE: 75 MMHG

## 2024-04-05 PROCEDURE — 99239 HOSP IP/OBS DSCHRG MGMT >30: CPT

## 2024-04-05 RX ORDER — DIVALPROEX SODIUM 500 MG/1
1 TABLET, DELAYED RELEASE ORAL
Qty: 60 | Refills: 0
Start: 2024-04-05 | End: 2024-05-04

## 2024-04-05 RX ORDER — DORZOLAMIDE HYDROCHLORIDE 20 MG/ML
1 SOLUTION/ DROPS OPHTHALMIC
Qty: 0 | Refills: 0 | DISCHARGE

## 2024-04-05 RX ADMIN — DORZOLAMIDE HYDROCHLORIDE 1 DROP(S): 20 SOLUTION/ DROPS OPHTHALMIC at 09:55

## 2024-04-05 RX ADMIN — DIVALPROEX SODIUM 250 MILLIGRAM(S): 500 TABLET, DELAYED RELEASE ORAL at 06:15

## 2024-04-05 RX ADMIN — AMLODIPINE BESYLATE 10 MILLIGRAM(S): 2.5 TABLET ORAL at 06:15

## 2024-04-05 RX ADMIN — Medication 25 MILLIGRAM(S): at 06:15

## 2024-04-05 RX ADMIN — DIVALPROEX SODIUM 250 MILLIGRAM(S): 500 TABLET, DELAYED RELEASE ORAL at 17:35

## 2024-04-05 RX ADMIN — Medication 81 MILLIGRAM(S): at 12:03

## 2024-04-05 RX ADMIN — Medication 25 MILLIGRAM(S): at 17:36

## 2024-04-05 NOTE — DISCHARGE NOTE PROVIDER - HOSPITAL COURSE
89 years old female with h/o HTN, HLD, dementia ( A&O x 1-2 baseline), focal seizure ( refused medications at home), was brought in to ED with increased confused with urinary incontinence. Patient had prior abnormal EEG in 2022 with evidence for increased risk for seizures from area of focal dysfunction in the left temporal region but she refused to take depakote at home. Has intermittent episodes of confusion but more frequent lately. No fever, chills, nausea, vomiting or diarrhea. No urinary symptoms  Hypertensive, afebrile, sat well at [facility]. No leukocytosis, plt 201, K 3.2, Cr 0.72, phosphorus 2, hsTnT 26.8. UA not suggestive of UTI. CT head with involutional changes and volume loss. No acute pathology. CXR with no focal consolidation      Problem/Plan - 1:  ·  Problem: AMS (altered mental status).   ·  Plan: brought in with episodes of confusion with urinary incontinence  CT head  ( I personally review) with involutional changes and volume loss. No acute pathology. CXR with no focal consolidation  Prior EEG with evidence for increased risk for seizures from area of focal dysfunction in the left temporal region. Seen by neurology, prescribed Depakote but patient refused to take it at home  suspect due to seizure  Resume Depakote DR  Patient is following with neurology Dr Subha Velasco outpatient.  neuro c/s appreciated can follow up as outpt     Problem/Plan - 2:  ·  Problem: Disorder of electrolytes.   ·  Plan: hypokalemia/hypophosphatemia   replace with oral KCL and potassium phosphate  Monitor and replace serum electrolytes as needed.    Problem/Plan - 3:  ·  Problem: Benign essential HTN.   ·  Plan: hypertensive, due to not taking BP med  Resume BP med  monitor BP and titrate BP med.    Problem/Plan - 4:  ·  Problem: Hyperlipidemia, unspecified.   ·  Plan: resume statin.    Problem/Plan - 5:  ·  Problem: Dementia.   ·  Plan: supportive care, at risk for delirium.    Dispo- patient refused MEGAN, has family support from children and 8hr HHA      pt seen and examined 45 min spent on dc planning     Lab test review, Radiology Review, Vitals review, Consultant review and discussion, Physical examination, IDR, Assessment and plan; Plan discussion with patient and family

## 2024-04-05 NOTE — DISCHARGE NOTE NURSING/CASE MANAGEMENT/SOCIAL WORK - PATIENT PORTAL LINK FT
You can access the FollowMyHealth Patient Portal offered by [facility] by registering at the following website: [email or web address]. By joining [employer]’s FollowMyHealth portal, you will also be able to view your health information using other applications (apps) compatible with our system.

## 2024-04-05 NOTE — DISCHARGE NOTE PROVIDER - NSDCMRMEDTOKEN_GEN_ALL_CORE_FT
amLODIPine 10 mg oral tablet: 1 tab(s) orally once a day  aspirin 81 mg oral tablet, chewable: 1 tab(s) orally once a day  atorvastatin 20 mg oral tablet: 1 tab(s) orally once a day (at bedtime)  divalproex sodium 250 mg oral delayed release tablet: 1 tab(s) orally 2 times a day  dorzolamide 2% ophthalmic solution: 1 drop(s) in each eye 2 times a day  famotidine 20 mg oral tablet: 1 tab(s) orally once a day  metoprolol tartrate 25 mg oral tablet: 1 tab(s) orally 2 times a day

## 2024-04-05 NOTE — DISCHARGE NOTE PROVIDER - NSDCCPCAREPLAN_GEN_ALL_CORE_FT
PRINCIPAL DISCHARGE DIAGNOSIS  Diagnosis: Confusion  Assessment and Plan of Treatment: continue with seizure medications as prescribed, follow up w neurology

## 2024-04-05 NOTE — DISCHARGE NOTE PROVIDER - CARE PROVIDER_API CALL
Subha Velasco  Neurology  [address] [place] [address]  Phone: [phone]  Fax: [phone]  Follow Up Time:

## 2024-04-11 DIAGNOSIS — G40.109 LOCALIZATION-RELATED (FOCAL) (PARTIAL) SYMPTOMATIC EPILEPSY AND EPILEPTIC SYNDROMES WITH SIMPLE PARTIAL SEIZURES, NOT INTRACTABLE, WITHOUT STATUS EPILEPTICUS: ICD-10-CM

## 2024-04-11 DIAGNOSIS — I10 ESSENTIAL (PRIMARY) HYPERTENSION: ICD-10-CM

## 2024-04-11 DIAGNOSIS — E78.00 PURE HYPERCHOLESTEROLEMIA, UNSPECIFIED: ICD-10-CM

## 2024-04-11 DIAGNOSIS — Z88.0 ALLERGY STATUS TO PENICILLIN: ICD-10-CM

## 2024-04-11 DIAGNOSIS — H40.9 UNSPECIFIED GLAUCOMA: ICD-10-CM

## 2024-04-11 DIAGNOSIS — Z79.82 LONG TERM (CURRENT) USE OF ASPIRIN: ICD-10-CM

## 2024-04-11 DIAGNOSIS — D32.0 BENIGN NEOPLASM OF CEREBRAL MENINGES: ICD-10-CM

## 2024-04-11 DIAGNOSIS — E83.39 OTHER DISORDERS OF PHOSPHORUS METABOLISM: ICD-10-CM

## 2024-04-11 DIAGNOSIS — E87.6 HYPOKALEMIA: ICD-10-CM

## 2024-04-11 DIAGNOSIS — R32 UNSPECIFIED URINARY INCONTINENCE: ICD-10-CM

## 2024-04-11 DIAGNOSIS — F03.90 UNSPECIFIED DEMENTIA WITHOUT BEHAVIORAL DISTURBANCE: ICD-10-CM

## 2024-04-11 DIAGNOSIS — Z91.148 PATIENT'S OTHER NONCOMPLIANCE WITH MEDICATION REGIMEN FOR OTHER REASON: ICD-10-CM

## 2024-04-11 DIAGNOSIS — R41.82 ALTERED MENTAL STATUS, UNSPECIFIED: ICD-10-CM

## 2024-04-11 DIAGNOSIS — H54.8 LEGAL BLINDNESS, AS DEFINED IN USA: ICD-10-CM

## 2024-05-28 ENCOUNTER — INPATIENT (INPATIENT)
Facility: HOSPITAL | Age: 89
LOS: 2 days | Discharge: HOME HEALTH | End: 2024-05-31
Attending: INTERNAL MEDICINE | Admitting: INTERNAL MEDICINE
Payer: MEDICARE

## 2024-05-28 VITALS
TEMPERATURE: 98 F | DIASTOLIC BLOOD PRESSURE: 89 MMHG | SYSTOLIC BLOOD PRESSURE: 156 MMHG | WEIGHT: 179.9 LBS | RESPIRATION RATE: 19 BRPM | OXYGEN SATURATION: 97 % | HEIGHT: 63 IN | HEART RATE: 103 BPM

## 2024-05-28 DIAGNOSIS — R41.82 ALTERED MENTAL STATUS, UNSPECIFIED: ICD-10-CM

## 2024-05-28 DIAGNOSIS — R56.9 UNSPECIFIED CONVULSIONS: ICD-10-CM

## 2024-05-28 DIAGNOSIS — I10 ESSENTIAL (PRIMARY) HYPERTENSION: ICD-10-CM

## 2024-05-28 DIAGNOSIS — E83.39 OTHER DISORDERS OF PHOSPHORUS METABOLISM: ICD-10-CM

## 2024-05-28 DIAGNOSIS — E78.5 HYPERLIPIDEMIA, UNSPECIFIED: ICD-10-CM

## 2024-05-28 DIAGNOSIS — R35.89 OTHER POLYURIA: ICD-10-CM

## 2024-05-28 DIAGNOSIS — F03.90 UNSPECIFIED DEMENTIA, UNSPECIFIED SEVERITY, WITHOUT BEHAVIORAL DISTURBANCE, PSYCHOTIC DISTURBANCE, MOOD DISTURBANCE, AND ANXIETY: ICD-10-CM

## 2024-05-28 LAB
ALBUMIN SERPL ELPH-MCNC: 3.3 G/DL — SIGNIFICANT CHANGE UP (ref 3.3–5)
ALP SERPL-CCNC: 64 U/L — SIGNIFICANT CHANGE UP (ref 40–120)
ALT FLD-CCNC: 23 U/L — SIGNIFICANT CHANGE UP (ref 12–78)
ANION GAP SERPL CALC-SCNC: 11 MMOL/L — SIGNIFICANT CHANGE UP (ref 5–17)
ANION GAP SERPL CALC-SCNC: 8 MMOL/L — SIGNIFICANT CHANGE UP (ref 5–17)
APPEARANCE UR: CLEAR — SIGNIFICANT CHANGE UP
AST SERPL-CCNC: 23 U/L — SIGNIFICANT CHANGE UP (ref 15–37)
BACTERIA # UR AUTO: ABNORMAL /HPF
BASE EXCESS BLDV CALC-SCNC: -1.5 MMOL/L — SIGNIFICANT CHANGE UP (ref -2–3)
BASOPHILS # BLD AUTO: 0.03 K/UL — SIGNIFICANT CHANGE UP (ref 0–0.2)
BASOPHILS NFR BLD AUTO: 0.5 % — SIGNIFICANT CHANGE UP (ref 0–2)
BILIRUB SERPL-MCNC: 0.5 MG/DL — SIGNIFICANT CHANGE UP (ref 0.2–1.2)
BILIRUB UR-MCNC: NEGATIVE — SIGNIFICANT CHANGE UP
BLOOD GAS COMMENTS, VENOUS: SIGNIFICANT CHANGE UP
BUN SERPL-MCNC: 10 MG/DL — SIGNIFICANT CHANGE UP (ref 7–23)
BUN SERPL-MCNC: 12 MG/DL — SIGNIFICANT CHANGE UP (ref 7–23)
CALCIUM SERPL-MCNC: 8.8 MG/DL — SIGNIFICANT CHANGE UP (ref 8.5–10.1)
CALCIUM SERPL-MCNC: 8.9 MG/DL — SIGNIFICANT CHANGE UP (ref 8.5–10.1)
CHLORIDE BLDV-SCNC: 105 MMOL/L — SIGNIFICANT CHANGE UP (ref 98–107)
CHLORIDE SERPL-SCNC: 108 MMOL/L — SIGNIFICANT CHANGE UP (ref 96–108)
CHLORIDE SERPL-SCNC: 109 MMOL/L — HIGH (ref 96–108)
CK SERPL-CCNC: 216 U/L — HIGH (ref 26–192)
CO2 BLDV-SCNC: 24 MMOL/L — SIGNIFICANT CHANGE UP (ref 22–26)
CO2 SERPL-SCNC: 22 MMOL/L — SIGNIFICANT CHANGE UP (ref 22–31)
CO2 SERPL-SCNC: 23 MMOL/L — SIGNIFICANT CHANGE UP (ref 22–31)
COLOR SPEC: YELLOW — SIGNIFICANT CHANGE UP
COMMENT - URINE: SIGNIFICANT CHANGE UP
CREAT SERPL-MCNC: 0.87 MG/DL — SIGNIFICANT CHANGE UP (ref 0.5–1.3)
CREAT SERPL-MCNC: 1.05 MG/DL — SIGNIFICANT CHANGE UP (ref 0.5–1.3)
DIFF PNL FLD: NEGATIVE — SIGNIFICANT CHANGE UP
EGFR: 50 ML/MIN/1.73M2 — LOW
EGFR: 62 ML/MIN/1.73M2 — SIGNIFICANT CHANGE UP
EOSINOPHIL # BLD AUTO: 0.13 K/UL — SIGNIFICANT CHANGE UP (ref 0–0.5)
EOSINOPHIL NFR BLD AUTO: 2.1 % — SIGNIFICANT CHANGE UP (ref 0–6)
EPI CELLS # UR: PRESENT
FLUAV AG NPH QL: SIGNIFICANT CHANGE UP
FLUBV AG NPH QL: SIGNIFICANT CHANGE UP
GAS PNL BLDV: 136 MMOL/L — SIGNIFICANT CHANGE UP (ref 136–145)
GAS PNL BLDV: SIGNIFICANT CHANGE UP
GAS PNL BLDV: SIGNIFICANT CHANGE UP
GLUCOSE BLDV-MCNC: 189 MG/DL — HIGH (ref 65–95)
GLUCOSE SERPL-MCNC: 139 MG/DL — HIGH (ref 70–99)
GLUCOSE SERPL-MCNC: 183 MG/DL — HIGH (ref 70–99)
GLUCOSE UR QL: NEGATIVE MG/DL — SIGNIFICANT CHANGE UP
HCO3 BLDV-SCNC: 23 MMOL/L — SIGNIFICANT CHANGE UP (ref 22–28)
HCT VFR BLD CALC: 33.8 % — LOW (ref 34.5–45)
HCT VFR BLDA CALC: 47 % — SIGNIFICANT CHANGE UP (ref 37–47)
HGB BLD CALC-MCNC: 15.8 G/DL — SIGNIFICANT CHANGE UP (ref 11.7–16.1)
HGB BLD-MCNC: 11.3 G/DL — LOW (ref 11.5–15.5)
HOROWITZ INDEX BLDV+IHG-RTO: SIGNIFICANT CHANGE UP
IMM GRANULOCYTES NFR BLD AUTO: 0.6 % — SIGNIFICANT CHANGE UP (ref 0–0.9)
INR BLD: 0.97 RATIO — SIGNIFICANT CHANGE UP (ref 0.85–1.18)
KETONES UR-MCNC: NEGATIVE MG/DL — SIGNIFICANT CHANGE UP
LACTATE BLDV-MCNC: 4 MMOL/L — HIGH (ref 0.56–1.39)
LACTATE SERPL-SCNC: 2.2 MMOL/L — HIGH (ref 0.7–2)
LACTATE SERPL-SCNC: 4 MMOL/L — CRITICAL HIGH (ref 0.7–2)
LACTATE SERPL-SCNC: 5.1 MMOL/L — CRITICAL HIGH (ref 0.7–2)
LEUKOCYTE ESTERASE UR-ACNC: ABNORMAL
LYMPHOCYTES # BLD AUTO: 2.64 K/UL — SIGNIFICANT CHANGE UP (ref 1–3.3)
LYMPHOCYTES # BLD AUTO: 42.2 % — SIGNIFICANT CHANGE UP (ref 13–44)
MAGNESIUM SERPL-MCNC: 1.8 MG/DL — SIGNIFICANT CHANGE UP (ref 1.6–2.6)
MAGNESIUM SERPL-MCNC: 2 MG/DL — SIGNIFICANT CHANGE UP (ref 1.6–2.6)
MCHC RBC-ENTMCNC: 30.9 PG — SIGNIFICANT CHANGE UP (ref 27–34)
MCHC RBC-ENTMCNC: 33.4 G/DL — SIGNIFICANT CHANGE UP (ref 32–36)
MCV RBC AUTO: 92.3 FL — SIGNIFICANT CHANGE UP (ref 80–100)
MONOCYTES # BLD AUTO: 0.73 K/UL — SIGNIFICANT CHANGE UP (ref 0–0.9)
MONOCYTES NFR BLD AUTO: 11.7 % — SIGNIFICANT CHANGE UP (ref 2–14)
NEUTROPHILS # BLD AUTO: 2.68 K/UL — SIGNIFICANT CHANGE UP (ref 1.8–7.4)
NEUTROPHILS NFR BLD AUTO: 42.9 % — LOW (ref 43–77)
NITRITE UR-MCNC: NEGATIVE — SIGNIFICANT CHANGE UP
NRBC # BLD: 0 /100 WBCS — SIGNIFICANT CHANGE UP (ref 0–0)
PCO2 BLDV: 37 MMHG — LOW (ref 42–55)
PH BLDV: 7.4 — SIGNIFICANT CHANGE UP (ref 7.32–7.43)
PH UR: 7.5 — SIGNIFICANT CHANGE UP (ref 5–8)
PHOSPHATE SERPL-MCNC: 2.2 MG/DL — LOW (ref 2.5–4.5)
PHOSPHATE SERPL-MCNC: 2.5 MG/DL — SIGNIFICANT CHANGE UP (ref 2.5–4.5)
PLATELET # BLD AUTO: 207 K/UL — SIGNIFICANT CHANGE UP (ref 150–400)
PO2 BLDV: 44 MMHG — SIGNIFICANT CHANGE UP (ref 25–45)
POTASSIUM BLDV-SCNC: 3.5 MMOL/L — SIGNIFICANT CHANGE UP (ref 3.5–5.1)
POTASSIUM SERPL-MCNC: 3.2 MMOL/L — LOW (ref 3.5–5.3)
POTASSIUM SERPL-MCNC: 3.4 MMOL/L — LOW (ref 3.5–5.3)
POTASSIUM SERPL-SCNC: 3.2 MMOL/L — LOW (ref 3.5–5.3)
POTASSIUM SERPL-SCNC: 3.4 MMOL/L — LOW (ref 3.5–5.3)
PROT SERPL-MCNC: 7.4 GM/DL — SIGNIFICANT CHANGE UP (ref 6–8.3)
PROT UR-MCNC: NEGATIVE MG/DL — SIGNIFICANT CHANGE UP
PROTHROM AB SERPL-ACNC: 11.5 SEC — SIGNIFICANT CHANGE UP (ref 9.5–13)
RBC # BLD: 3.66 M/UL — LOW (ref 3.8–5.2)
RBC # FLD: 14.7 % — HIGH (ref 10.3–14.5)
RBC CASTS # UR COMP ASSIST: 2 /HPF — SIGNIFICANT CHANGE UP (ref 0–4)
SAO2 % BLDV: 67.2 % — LOW (ref 94–98)
SARS-COV-2 RNA SPEC QL NAA+PROBE: SIGNIFICANT CHANGE UP
SODIUM SERPL-SCNC: 140 MMOL/L — SIGNIFICANT CHANGE UP (ref 135–145)
SODIUM SERPL-SCNC: 141 MMOL/L — SIGNIFICANT CHANGE UP (ref 135–145)
SP GR SPEC: 1.01 — SIGNIFICANT CHANGE UP (ref 1–1.03)
TSH SERPL-MCNC: 4.19 UU/ML — HIGH (ref 0.36–3.74)
UROBILINOGEN FLD QL: 0.2 MG/DL — SIGNIFICANT CHANGE UP (ref 0.2–1)
WBC # BLD: 6.25 K/UL — SIGNIFICANT CHANGE UP (ref 3.8–10.5)
WBC # FLD AUTO: 6.25 K/UL — SIGNIFICANT CHANGE UP (ref 3.8–10.5)
WBC UR QL: 6 /HPF — HIGH (ref 0–5)

## 2024-05-28 PROCEDURE — 99222 1ST HOSP IP/OBS MODERATE 55: CPT

## 2024-05-28 PROCEDURE — 70450 CT HEAD/BRAIN W/O DYE: CPT | Mod: 26,MC

## 2024-05-28 PROCEDURE — 99285 EMERGENCY DEPT VISIT HI MDM: CPT

## 2024-05-28 PROCEDURE — 71045 X-RAY EXAM CHEST 1 VIEW: CPT | Mod: 26

## 2024-05-28 PROCEDURE — 93010 ELECTROCARDIOGRAM REPORT: CPT

## 2024-05-28 RX ORDER — CEFTRIAXONE 500 MG/1
1000 INJECTION, POWDER, FOR SOLUTION INTRAMUSCULAR; INTRAVENOUS ONCE
Refills: 0 | Status: COMPLETED | OUTPATIENT
Start: 2024-05-28 | End: 2024-05-28

## 2024-05-28 RX ORDER — ACETAMINOPHEN 500 MG
1000 TABLET ORAL ONCE
Refills: 0 | Status: COMPLETED | OUTPATIENT
Start: 2024-05-28 | End: 2024-05-28

## 2024-05-28 RX ORDER — DORZOLAMIDE HYDROCHLORIDE 20 MG/ML
1 SOLUTION/ DROPS OPHTHALMIC
Refills: 0 | Status: DISCONTINUED | OUTPATIENT
Start: 2024-05-28 | End: 2024-05-31

## 2024-05-28 RX ORDER — ASPIRIN/CALCIUM CARB/MAGNESIUM 324 MG
81 TABLET ORAL DAILY
Refills: 0 | Status: DISCONTINUED | OUTPATIENT
Start: 2024-05-28 | End: 2024-05-31

## 2024-05-28 RX ORDER — ACETAMINOPHEN 500 MG
650 TABLET ORAL EVERY 6 HOURS
Refills: 0 | Status: DISCONTINUED | OUTPATIENT
Start: 2024-05-28 | End: 2024-05-31

## 2024-05-28 RX ORDER — POTASSIUM CHLORIDE 20 MEQ
40 PACKET (EA) ORAL ONCE
Refills: 0 | Status: COMPLETED | OUTPATIENT
Start: 2024-05-28 | End: 2024-05-28

## 2024-05-28 RX ORDER — LABETALOL HCL 100 MG
10 TABLET ORAL ONCE
Refills: 0 | Status: COMPLETED | OUTPATIENT
Start: 2024-05-28 | End: 2024-05-28

## 2024-05-28 RX ORDER — METOPROLOL TARTRATE 50 MG
25 TABLET ORAL ONCE
Refills: 0 | Status: COMPLETED | OUTPATIENT
Start: 2024-05-28 | End: 2024-05-28

## 2024-05-28 RX ORDER — POTASSIUM PHOSPHATE, MONOBASIC POTASSIUM PHOSPHATE, DIBASIC 236; 224 MG/ML; MG/ML
15 INJECTION, SOLUTION INTRAVENOUS ONCE
Refills: 0 | Status: COMPLETED | OUTPATIENT
Start: 2024-05-28 | End: 2024-05-28

## 2024-05-28 RX ORDER — SODIUM CHLORIDE 9 MG/ML
1000 INJECTION, SOLUTION INTRAVENOUS
Refills: 0 | Status: DISCONTINUED | OUTPATIENT
Start: 2024-05-28 | End: 2024-05-30

## 2024-05-28 RX ORDER — AMLODIPINE BESYLATE 2.5 MG/1
10 TABLET ORAL DAILY
Refills: 0 | Status: DISCONTINUED | OUTPATIENT
Start: 2024-05-28 | End: 2024-05-31

## 2024-05-28 RX ORDER — ATORVASTATIN CALCIUM 80 MG/1
20 TABLET, FILM COATED ORAL AT BEDTIME
Refills: 0 | Status: DISCONTINUED | OUTPATIENT
Start: 2024-05-28 | End: 2024-05-31

## 2024-05-28 RX ORDER — ENOXAPARIN SODIUM 100 MG/ML
40 INJECTION SUBCUTANEOUS EVERY 24 HOURS
Refills: 0 | Status: DISCONTINUED | OUTPATIENT
Start: 2024-05-28 | End: 2024-05-31

## 2024-05-28 RX ORDER — DIVALPROEX SODIUM 500 MG/1
250 TABLET, DELAYED RELEASE ORAL
Refills: 0 | Status: DISCONTINUED | OUTPATIENT
Start: 2024-05-28 | End: 2024-05-31

## 2024-05-28 RX ORDER — LANOLIN ALCOHOL/MO/W.PET/CERES
3 CREAM (GRAM) TOPICAL AT BEDTIME
Refills: 0 | Status: DISCONTINUED | OUTPATIENT
Start: 2024-05-28 | End: 2024-05-31

## 2024-05-28 RX ORDER — METOPROLOL TARTRATE 50 MG
25 TABLET ORAL
Refills: 0 | Status: DISCONTINUED | OUTPATIENT
Start: 2024-05-28 | End: 2024-05-31

## 2024-05-28 RX ORDER — SODIUM CHLORIDE 9 MG/ML
2500 INJECTION, SOLUTION INTRAVENOUS ONCE
Refills: 0 | Status: COMPLETED | OUTPATIENT
Start: 2024-05-28 | End: 2024-05-28

## 2024-05-28 RX ADMIN — Medication 400 MILLIGRAM(S): at 07:56

## 2024-05-28 RX ADMIN — ENOXAPARIN SODIUM 40 MILLIGRAM(S): 100 INJECTION SUBCUTANEOUS at 12:40

## 2024-05-28 RX ADMIN — Medication 650 MILLIGRAM(S): at 15:30

## 2024-05-28 RX ADMIN — SODIUM CHLORIDE 150 MILLILITER(S): 9 INJECTION, SOLUTION INTRAVENOUS at 15:49

## 2024-05-28 RX ADMIN — Medication 10 MILLIGRAM(S): at 10:54

## 2024-05-28 RX ADMIN — Medication 81 MILLIGRAM(S): at 12:40

## 2024-05-28 RX ADMIN — Medication 25 MILLIGRAM(S): at 09:15

## 2024-05-28 RX ADMIN — Medication 25 MILLIGRAM(S): at 19:39

## 2024-05-28 RX ADMIN — SODIUM CHLORIDE 2500 MILLILITER(S): 9 INJECTION, SOLUTION INTRAVENOUS at 07:45

## 2024-05-28 RX ADMIN — Medication 40 MILLIEQUIVALENT(S): at 09:17

## 2024-05-28 RX ADMIN — DIVALPROEX SODIUM 250 MILLIGRAM(S): 500 TABLET, DELAYED RELEASE ORAL at 12:40

## 2024-05-28 RX ADMIN — Medication 650 MILLIGRAM(S): at 14:34

## 2024-05-28 RX ADMIN — AMLODIPINE BESYLATE 10 MILLIGRAM(S): 2.5 TABLET ORAL at 15:49

## 2024-05-28 RX ADMIN — DIVALPROEX SODIUM 250 MILLIGRAM(S): 500 TABLET, DELAYED RELEASE ORAL at 19:10

## 2024-05-28 RX ADMIN — POTASSIUM PHOSPHATE, MONOBASIC POTASSIUM PHOSPHATE, DIBASIC 62.5 MILLIMOLE(S): 236; 224 INJECTION, SOLUTION INTRAVENOUS at 12:35

## 2024-05-28 RX ADMIN — CEFTRIAXONE 100 MILLIGRAM(S): 500 INJECTION, POWDER, FOR SOLUTION INTRAMUSCULAR; INTRAVENOUS at 10:13

## 2024-05-28 NOTE — ED PROVIDER NOTE - PHYSICAL EXAMINATION
Tolu MOORE:  VITALS: Initial triage and subsequent vitals have been reviewed by me.  GEN APPEARANCE: Alert, cooperative. Non-toxic appearing. Well appearing. NAD.  HEAD: Atraumatic, normocephalic   EYES: PERRLa, EOMI, vision grossly intact.   EARS: Gross hearing intact.   NOSE: No nasal discharge, no external evidence of epistaxis.   NECK: Supple  CV: RRR, S1S2, no c/r/m/g. No cyanosis. Extremities warm, well perfused. Cap refill <2 seconds. No bruits.   LUNGS: CTAB. No wheezing. No rales. No rhonchi. No diminished breath sounds.   ABDOMEN: Soft, NTND. No guarding or rebound. No masses.   MSK/EXT: Spine appears normal, no spine point tenderness. No CVA ttp. Normal muscular development. Pelvis stable. No obvious joint or bony deformity, no peripheral edema.   NEURO: Alert, follows commands. Speech normal. Sensation and motor normal x4 extremities.   SKIN: Normal color for race, warm, dry and intact. No evidence of rash.  PSYCH: Normal mood and affect. A&Ox1

## 2024-05-28 NOTE — ED PROVIDER NOTE - CLINICAL SUMMARY MEDICAL DECISION MAKING FREE TEXT BOX
Tolu MOORE: Exam patient arrives tachycardic with what appears to be some eventual tachycardia versus sinus tachycardia to the 150s 160s blood pressure reassuring temperature 99.9 rectally, DDx concern for possible possible postictal state in setting of recent seizure?  Would also consider possible delirium event in the setting of provoking infection consider urinary tract infection given history, no signs to suggest traumatic injury, less concern for acute CVA however given overall constellation of symptoms we will check basic labs EKG check urine to evaluate infection get CT head EKG monitor closely.  Anticipate admission.  Patient is full code per family.

## 2024-05-28 NOTE — H&P ADULT - PROBLEM SELECTOR PLAN 2
due to not taking Depakote at home  EEG with no active seizure in 04/2024  Resume Depakote  ED will call neurology  Unclear the benefit of repeating EEG here since seizure is due to not taking medication. Will hold off pending neurology recommendation

## 2024-05-28 NOTE — H&P ADULT - REASON FOR ADMISSION
Mom states "he told teacher he thinks about killing himself and ways to do it".  Pt states he doesn't want to talk about it, admits to thinking of stabbing himself with a pen, as he has seen in movies. Denies self harm, or wanting to hurt others. Calm and cooperative in triage. AMS, seizure

## 2024-05-28 NOTE — ED PROVIDER NOTE - PROGRESS NOTE DETAILS
Tolu MOORE: Tolu MOORE: I discussed case with the hospitalist, see accepting physician. Will arrange for admission to their service.

## 2024-05-28 NOTE — ED ADULT NURSE NOTE - NSFALLHARMRISKINTERV_ED_ALL_ED

## 2024-05-28 NOTE — ED ADULT NURSE NOTE - OBJECTIVE STATEMENT
patient A+Ox1, pmh of dementia and seizures, presents post seizure. Daughter states she got a call from aid, she had a period of unresponsiveness, when she came to, more disoriented than usual. As per daughter, patient is not at normal baseline mental status, can usual recognize who she is, cannot now. As per daughter, patient has been refusing to take seizure medications for past two weeks. Upon arrival from EMS, Dr. Motley at bedside, HR was 170 on cardiac monitor, EKG done, IVP placed on 2500ML LR started, IVPB Tylenol administered, rectal temp of 99.9F.

## 2024-05-28 NOTE — H&P ADULT - PROBLEM SELECTOR PLAN 1
brought in with witness seizure with increased confusion  Patient is refusing depakote at home for 2 weeks  CT head  ( I personally review) with no acute pathology. Mild chronic microvascular changes  Metabolic encephalopathy due to seizure  resume depakote  Monitor mental status

## 2024-05-28 NOTE — ED ADULT TRIAGE NOTE - CHIEF COMPLAINT QUOTE
Pt biba from home with witness seizure for 2 minutes, ems reports, family stated pt refuse to take home medication for 2 weeks.  H/O: htn, seizures

## 2024-05-28 NOTE — H&P ADULT - PROBLEM SELECTOR PLAN 4
patient has urine output of 3.5L within 3-4 hours in ED  Repeat BMP, check serum and urine osmolality  IV LR  closely monitor serum electrolytes, intake and output  nephrology consulted

## 2024-05-28 NOTE — H&P ADULT - NSHPPHYSICALEXAM_GEN_ALL_CORE
CONSTITUTIONAL: alert and cooperative, no acute distress.  EYES: PERRL,  no scleral icterus  ENT: Mucosa moist, tongue normal.  NECK: Neck supple, trachea midline, non-tender,  CARDIAC: Normal S1 and S2. Regular rate and rhythms. No Pedal edema. Peripheral pulses intact  LUNGS: Equal air entry both lungs. No rales, rhonchi, wheezing. Normal respiratory effort.   ABDOMEN: Soft, nondistended, nontender. No guarding or rebound tenderness. No hepatomegaly or splenomegaly. Bowel sound normal.   MUSCULOSKELETAL: Normocephalic, atraumatic. No significant deformity or joint abnormality  NEUROLOGICAL: No gross motor or sensory deficits  SKIN: no lesions or eruptions. Normal turgor  PSYCHIATRIC: A&O x 2, appropriate mood and affect

## 2024-05-28 NOTE — ED ADULT NURSE NOTE - CHIEF COMPLAINT
I will review your studies via [employer] when they are available. If you have any questions or concerns please let me know via [employer], or call the clinic. I think a follow up with dermatology should be considered after we know some lab results and you do the GI work up.       
The patient is a 89y Female complaining of seizures.

## 2024-05-28 NOTE — ED PROVIDER NOTE - OBJECTIVE STATEMENT
Tolu MOORE: 89-year-old female history of seizure disorders high blood pressure hyperlipidemia on Depakote but possibly has not been taking for the last 2 weeks present with a chief complaint of concern for seizure episode earlier this morning, daughter at bedside providing history as patient has history of dementia and is a limited historian reports had an episode today of confusion possibly slurring words that appeared consistent with patient's prior seizure episodes, denies shaking, however daughter notes since this morning patient has been increasingly confused, some episode in the past and patient was diagnosed with a urinary tract infection.  No falls.  Patient has a history of dementia but would normally know names and places of family and patient cannot identify her daughter at this time.  There is no nausea no vomiting no reported fever no chest pain no trouble breathing patient offers no physical complaints but is a limited historian.  Daughter was not with the patient earlier this morning patient was with an aide who is not physically in the ER to provide history.

## 2024-05-28 NOTE — H&P ADULT - ASSESSMENT
89 years old female with h/o HTN, HLD, dementia ( A&O x 1-2 baseline), focal seizure ( refused medications at home) was brought in to ED for witness seizure for 2 minutes. Patient refused to take seizure med for 2 weeks. Denied any fever, chills, nausea, vomiting or diarrhea.  Hypertensive upon arrival, afebrile, sat well at RA. No leukocytosis, plt 207, lactate 5.1---> 4, Cr1.058, TSH 4.19, phosphorus 2.2. UA not suggestive of UTI. CT head with no acute pathology. Mild chronic microvascular changes

## 2024-05-28 NOTE — CONSULT NOTE ADULT - SUBJECTIVE AND OBJECTIVE BOX
Patient chart reviewed, full consult to follow.     Follow UO, serum Na  and urine osm trend.    Thank you for the courtesy of this consultation. [facility] NEPHROLOGY SERVICES, [facility]  NEPHROLOGY AND HYPERTENSION  [address]  [address]  [place] [address]  [phone]    MD TRACI PALOMINO MD YELENA ROSENBERG, MD BINNY KOSHY, MD CHRISTOPHER CAPUTO, MD EDWARD BOVER, MD      Information from chart:  "Patient is a 89y old  Female who presents with a chief complaint of AMS, seizure (28 May 2024 15:14)    HPI:  89 years old female with h/o HTN, HLD, dementia ( A&O x 1-2 baseline), focal seizure ( refused medications at home) was brought in to ED for witness seizure for 2 minutes. Patient refused to take seizure med for 2 weeks. Denied any fever, chills, nausea, vomiting or diarrhea.  Hypertensive upon arrival, afebrile, sat well at RA. No leukocytosis, plt 207, lactate 5.1---> 4, Cr1.058, TSH 4.19, phosphorus 2.2. UA not suggestive of UTI. CT head with no acute pathology. Mild chronic microvascular changes (28 May 2024 12:52)   "  Brief brisk diuresis     PAST MEDICAL & SURGICAL HISTORY:  Glaucoma      Legal blindness      HTN (hypertension)      Seizure      No significant past surgical history        FAMILY HISTORY:    Allergies    penicillin (Hives)    Intolerances    Tolerated CTX 11/2023 (Other)    Home Medications:  aspirin 81 mg oral tablet, chewable: 1 tab(s) orally once a day (05 Apr 2024 19:52)  dorzolamide 2% ophthalmic solution: 1 drop(s) in each eye 2 times a day (05 Apr 2024 19:56)    MEDICATIONS  (STANDING):  amLODIPine   Tablet 10 milliGRAM(s) Oral daily  aspirin  chewable 81 milliGRAM(s) Oral daily  atorvastatin 20 milliGRAM(s) Oral at bedtime  divalproex  milliGRAM(s) Oral two times a day  dorzolamide 2% Ophthalmic Solution 1 Drop(s) Both EYES <User Schedule>  enoxaparin Injectable 40 milliGRAM(s) SubCutaneous every 24 hours  lactated ringers. 1000 milliLiter(s) (150 mL/Hr) IV Continuous <Continuous>  metoprolol tartrate 25 milliGRAM(s) Oral two times a day    MEDICATIONS  (PRN):  acetaminophen     Tablet .. 650 milliGRAM(s) Oral every 6 hours PRN Mild Pain (1 - 3), Moderate Pain (4 - 6)  melatonin 3 milliGRAM(s) Oral at bedtime PRN Insomnia    Vital Signs Last 24 Hrs  T(C): 36.9 (28 May 2024 17:19), Max: 38 (28 May 2024 14:23)  T(F): 98.5 (28 May 2024 17:19), Max: 100.4 (28 May 2024 14:23)  HR: 86 (28 May 2024 17:19) (82 - 103)  BP: 168/75 (28 May 2024 17:19) (148/76 - 206/102)  BP(mean): --  RR: 18 (28 May 2024 17:19) (18 - 20)  SpO2: 96% (28 May 2024 17:19) (96% - 100%)    Parameters below as of 28 May 2024 17:19  Patient On (Oxygen Delivery Method): room air        Daily Height in cm: 160.02 (28 May 2024 07:13)    Daily     05-28-24 @ 07:01  -  05-28-24 @ 22:51  --------------------------------------------------------  IN: 0 mL / OUT: 3500 mL / NET: -3500 mL      CAPILLARY BLOOD GLUCOSE      POCT Blood Glucose.: 186 mg/dL (28 May 2024 07:44)    PHYSICAL EXAM:      T(C): 36.9 (05-28-24 @ 17:19), Max: 38 (05-28-24 @ 14:23)  HR: 86 (05-28-24 @ 17:19) (82 - 103)  BP: 168/75 (05-28-24 @ 17:19) (148/76 - 206/102)  RR: 18 (05-28-24 @ 17:19) (18 - 20)  SpO2: 96% (05-28-24 @ 17:19) (96% - 100%)  Wt(kg): --  Lungs clear  Heart S1S2  Abd soft NT ND  Extremities:   tr edema              05-28    140  |  109<H>  |  10  ----------------------------<  139<H>  3.4<L>   |  23  |  0.87    Ca    8.8      28 May 2024 12:25  Phos  2.5     05-28  Mg     1.8     05-28    TPro  7.4  /  Alb  3.3  /  TBili  0.5  /  DBili  x   /  AST  23  /  ALT  23  /  AlkPhos  64  05-28                          11.3   6.25  )-----------( 207      ( 28 May 2024 07:40 )             33.8     Creatinine Trend: 0.87<--, 1.05<--  Urinalysis Basic - ( 28 May 2024 12:25 )    Color: x / Appearance: x / SG: x / pH: x  Gluc: 139 mg/dL / Ketone: x  / Bili: x / Urobili: x   Blood: x / Protein: x / Nitrite: x   Leuk Esterase: x / RBC: x / WBC x   Sq Epi: x / Non Sq Epi: x / Bacteria: x            Assessment   Transient, brisk diuresis  Stabilized    Plan  Follow UO, serum Na  and urine osm trend.  Further recommendations pending course     Krish Jackson MD          Thank you for the courtesy of this consultation.

## 2024-05-28 NOTE — ED ADULT NURSE NOTE - CHPI ED NUR SYMPTOMS NEG
no blurred vision/no dizziness/no loss of consciousness/no nausea/no numbness/no vomiting/no weakness

## 2024-05-28 NOTE — H&P ADULT - NSHPADDITIONALINFOADULT_GEN_ALL_CORE
Had episode of SVT in ED, resolved with IV fluid. Continue metoprolol 25mg bid. TSH reviewed. Outpatient cardiology follow up

## 2024-05-29 LAB
ALBUMIN SERPL ELPH-MCNC: 2.9 G/DL — LOW (ref 3.3–5)
ALP SERPL-CCNC: 59 U/L — SIGNIFICANT CHANGE UP (ref 40–120)
ALT FLD-CCNC: 18 U/L — SIGNIFICANT CHANGE UP (ref 12–78)
ANION GAP SERPL CALC-SCNC: 8 MMOL/L — SIGNIFICANT CHANGE UP (ref 5–17)
ANION GAP SERPL CALC-SCNC: 8 MMOL/L — SIGNIFICANT CHANGE UP (ref 5–17)
AST SERPL-CCNC: 21 U/L — SIGNIFICANT CHANGE UP (ref 15–37)
BILIRUB SERPL-MCNC: 0.7 MG/DL — SIGNIFICANT CHANGE UP (ref 0.2–1.2)
BUN SERPL-MCNC: 12 MG/DL — SIGNIFICANT CHANGE UP (ref 7–23)
BUN SERPL-MCNC: 8 MG/DL — SIGNIFICANT CHANGE UP (ref 7–23)
CALCIUM SERPL-MCNC: 9.1 MG/DL — SIGNIFICANT CHANGE UP (ref 8.5–10.1)
CALCIUM SERPL-MCNC: 9.2 MG/DL — SIGNIFICANT CHANGE UP (ref 8.5–10.1)
CHLORIDE SERPL-SCNC: 106 MMOL/L — SIGNIFICANT CHANGE UP (ref 96–108)
CHLORIDE SERPL-SCNC: 108 MMOL/L — SIGNIFICANT CHANGE UP (ref 96–108)
CO2 SERPL-SCNC: 24 MMOL/L — SIGNIFICANT CHANGE UP (ref 22–31)
CO2 SERPL-SCNC: 25 MMOL/L — SIGNIFICANT CHANGE UP (ref 22–31)
CREAT SERPL-MCNC: 0.78 MG/DL — SIGNIFICANT CHANGE UP (ref 0.5–1.3)
CREAT SERPL-MCNC: 0.89 MG/DL — SIGNIFICANT CHANGE UP (ref 0.5–1.3)
EGFR: 61 ML/MIN/1.73M2 — SIGNIFICANT CHANGE UP
EGFR: 71 ML/MIN/1.73M2 — SIGNIFICANT CHANGE UP
GLUCOSE SERPL-MCNC: 102 MG/DL — HIGH (ref 70–99)
GLUCOSE SERPL-MCNC: 87 MG/DL — SIGNIFICANT CHANGE UP (ref 70–99)
HCT VFR BLD CALC: 32.9 % — LOW (ref 34.5–45)
HGB BLD-MCNC: 10.9 G/DL — LOW (ref 11.5–15.5)
MAGNESIUM SERPL-MCNC: 1.9 MG/DL — SIGNIFICANT CHANGE UP (ref 1.6–2.6)
MAGNESIUM SERPL-MCNC: 1.9 MG/DL — SIGNIFICANT CHANGE UP (ref 1.6–2.6)
MCHC RBC-ENTMCNC: 30.9 PG — SIGNIFICANT CHANGE UP (ref 27–34)
MCHC RBC-ENTMCNC: 33.1 G/DL — SIGNIFICANT CHANGE UP (ref 32–36)
MCV RBC AUTO: 93.2 FL — SIGNIFICANT CHANGE UP (ref 80–100)
NRBC # BLD: 0 /100 WBCS — SIGNIFICANT CHANGE UP (ref 0–0)
OSMOLALITY SERPL: 286 MOSMOL/KG — SIGNIFICANT CHANGE UP (ref 280–301)
OSMOLALITY UR: 448 MOSM/KG — SIGNIFICANT CHANGE UP (ref 50–1200)
PHOSPHATE SERPL-MCNC: 2.6 MG/DL — SIGNIFICANT CHANGE UP (ref 2.5–4.5)
PHOSPHATE SERPL-MCNC: 2.9 MG/DL — SIGNIFICANT CHANGE UP (ref 2.5–4.5)
PLATELET # BLD AUTO: 191 K/UL — SIGNIFICANT CHANGE UP (ref 150–400)
POTASSIUM SERPL-MCNC: 3.2 MMOL/L — LOW (ref 3.5–5.3)
POTASSIUM SERPL-MCNC: 3.3 MMOL/L — LOW (ref 3.5–5.3)
POTASSIUM SERPL-SCNC: 3.2 MMOL/L — LOW (ref 3.5–5.3)
POTASSIUM SERPL-SCNC: 3.3 MMOL/L — LOW (ref 3.5–5.3)
PROT SERPL-MCNC: 7.1 GM/DL — SIGNIFICANT CHANGE UP (ref 6–8.3)
RBC # BLD: 3.53 M/UL — LOW (ref 3.8–5.2)
RBC # FLD: 14.8 % — HIGH (ref 10.3–14.5)
SODIUM SERPL-SCNC: 139 MMOL/L — SIGNIFICANT CHANGE UP (ref 135–145)
SODIUM SERPL-SCNC: 140 MMOL/L — SIGNIFICANT CHANGE UP (ref 135–145)
WBC # BLD: 6.39 K/UL — SIGNIFICANT CHANGE UP (ref 3.8–10.5)
WBC # FLD AUTO: 6.39 K/UL — SIGNIFICANT CHANGE UP (ref 3.8–10.5)

## 2024-05-29 PROCEDURE — 99232 SBSQ HOSP IP/OBS MODERATE 35: CPT

## 2024-05-29 RX ORDER — POTASSIUM CHLORIDE 20 MEQ
40 PACKET (EA) ORAL ONCE
Refills: 0 | Status: COMPLETED | OUTPATIENT
Start: 2024-05-29 | End: 2024-05-29

## 2024-05-29 RX ADMIN — Medication 25 MILLIGRAM(S): at 19:03

## 2024-05-29 RX ADMIN — Medication 81 MILLIGRAM(S): at 12:43

## 2024-05-29 RX ADMIN — AMLODIPINE BESYLATE 10 MILLIGRAM(S): 2.5 TABLET ORAL at 05:45

## 2024-05-29 RX ADMIN — SODIUM CHLORIDE 150 MILLILITER(S): 9 INJECTION, SOLUTION INTRAVENOUS at 10:44

## 2024-05-29 RX ADMIN — SODIUM CHLORIDE 150 MILLILITER(S): 9 INJECTION, SOLUTION INTRAVENOUS at 19:01

## 2024-05-29 RX ADMIN — DIVALPROEX SODIUM 250 MILLIGRAM(S): 500 TABLET, DELAYED RELEASE ORAL at 19:03

## 2024-05-29 RX ADMIN — Medication 3 MILLIGRAM(S): at 21:41

## 2024-05-29 RX ADMIN — DORZOLAMIDE HYDROCHLORIDE 1 DROP(S): 20 SOLUTION/ DROPS OPHTHALMIC at 21:41

## 2024-05-29 RX ADMIN — ENOXAPARIN SODIUM 40 MILLIGRAM(S): 100 INJECTION SUBCUTANEOUS at 12:43

## 2024-05-29 RX ADMIN — Medication 40 MILLIEQUIVALENT(S): at 12:42

## 2024-05-29 RX ADMIN — ATORVASTATIN CALCIUM 20 MILLIGRAM(S): 80 TABLET, FILM COATED ORAL at 21:41

## 2024-05-29 RX ADMIN — DIVALPROEX SODIUM 250 MILLIGRAM(S): 500 TABLET, DELAYED RELEASE ORAL at 05:45

## 2024-05-29 RX ADMIN — DORZOLAMIDE HYDROCHLORIDE 1 DROP(S): 20 SOLUTION/ DROPS OPHTHALMIC at 10:44

## 2024-05-29 RX ADMIN — Medication 25 MILLIGRAM(S): at 05:45

## 2024-05-29 NOTE — CONSULT NOTE ADULT - SUBJECTIVE AND OBJECTIVE BOX
Neurology consult    ORTIZ [id number]     Patient is a 89y old  Female who presents with a chief complaint of AMS, seizure (28 May 2024 15:14)      HPI:  89 years old female with h/o HTN, HLD, dementia ( A&O x 1-2 baseline), focal seizure ( refused medications at home) was brought in to ED for witness seizure for 2 minutes. Patient refused to take seizure med for 2 weeks. Denied any fever, chills, nausea, vomiting or diarrhea.  Hypertensive upon arrival, afebrile, sat well at RA. No leukocytosis, plt 207, lactate 5.1---> 4, Cr1.058, TSH 4.19, phosphorus 2.2. UA not suggestive of UTI. CT head with no acute pathology. Mild chronic microvascular changes (28 May 2024 12:52)    MEDICATIONS    acetaminophen     Tablet .. 650 milliGRAM(s) Oral every 6 hours PRN  amLODIPine   Tablet 10 milliGRAM(s) Oral daily  aspirin  chewable 81 milliGRAM(s) Oral daily  atorvastatin 20 milliGRAM(s) Oral at bedtime  divalproex  milliGRAM(s) Oral two times a day  dorzolamide 2% Ophthalmic Solution 1 Drop(s) Both EYES <User Schedule>  enoxaparin Injectable 40 milliGRAM(s) SubCutaneous every 24 hours  lactated ringers. 1000 milliLiter(s) IV Continuous <Continuous>  melatonin 3 milliGRAM(s) Oral at bedtime PRN  metoprolol tartrate 25 milliGRAM(s) Oral two times a day  potassium chloride    Tablet ER 40 milliEquivalent(s) Oral once      PMH: Benign essential HTN    Acute glaucoma    Glaucoma    Legal blindness    HTN (hypertension)    Seizure         PSH: No significant past surgical history        Family history: No history of dementia, strokes, or seizures   FAMILY HISTORY:      SOCIAL HISTORY:  No history of tobacco or alcohol use     Allergies    penicillin (Hives)    Intolerances    Tolerated CTX 11/2023 (Other)          Vital Signs Last 24 Hrs  T(C): 36.9 (29 May 2024 11:43), Max: 38 (28 May 2024 14:23)  T(F): 98.4 (29 May 2024 11:43), Max: 100.4 (28 May 2024 14:23)  HR: 72 (29 May 2024 11:43) (72 - 97)  BP: 118/68 (29 May 2024 11:43) (115/62 - 168/75)  BP(mean): --  RR: 17 (29 May 2024 11:43) (17 - 20)  SpO2: 96% (29 May 2024 11:43) (95% - 99%)    Parameters below as of 29 May 2024 11:43  Patient On (Oxygen Delivery Method): room air          On Neurological Examination:    Mental Status - Patient is alert, awake, oriented X2 follows simple commands  Cranial Nerves - b;ind , EOMI, , V1-V3 intact, no gross facial asymmetry, tongue/uvula midline    Motor Exam -   no drift arthritic    Sensory    Intact to light touch and pinprick bilaterally    Coord: FTN intact bilaterally     Gait -  deferred    GENERAL Exam:     Nontoxic , No Acute Distress   	  HEENT:  normocephalic, atraumatic  		  LUNGS:	Clear bilaterally  No Wheeze    	  HEART:	 Normal S1S2   No murmur RRR        	  GI/ ABDOMEN:  Soft  Non tender    EXTREMITIES:   No Edema  No Clubbing  No Cyanosis No Edema    MUSCULOSKELETAL: Normal Range of Motion  	   SKIN:      Normal   No Ecchymosis               LABS:  CBC Full  -  ( 29 May 2024 08:02 )  WBC Count : 6.39 K/uL  RBC Count : 3.53 M/uL  Hemoglobin : 10.9 g/dL  Hematocrit : 32.9 %  Platelet Count - Automated : 191 K/uL  Mean Cell Volume : 93.2 fl  Mean Cell Hemoglobin : 30.9 pg  Mean Cell Hemoglobin Concentration : 33.1 g/dL  Auto Neutrophil # : x  Auto Lymphocyte # : x  Auto Monocyte # : x  Auto Eosinophil # : x  Auto Basophil # : x  Auto Neutrophil % : x  Auto Lymphocyte % : x  Auto Monocyte % : x  Auto Eosinophil % : x  Auto Basophil % : x    Urinalysis Basic - ( 29 May 2024 08:02 )    Color: x / Appearance: x / SG: x / pH: x  Gluc: 87 mg/dL / Ketone: x  / Bili: x / Urobili: x   Blood: x / Protein: x / Nitrite: x   Leuk Esterase: x / RBC: x / WBC x   Sq Epi: x / Non Sq Epi: x / Bacteria: x      05-29    140  |  108  |  8   ----------------------------<  87  3.3<L>   |  24  |  0.78    Ca    9.1      29 May 2024 08:02  Phos  2.6     05-29  Mg     1.9     05-29    TPro  7.1  /  Alb  2.9<L>  /  TBili  0.7  /  DBili  x   /  AST  21  /  ALT  18  /  AlkPhos  59  05-29    LIVER FUNCTIONS - ( 29 May 2024 08:02 )  Alb: 2.9 g/dL / Pro: 7.1 gm/dL / ALK PHOS: 59 U/L / ALT: 18 U/L / AST: 21 U/L / GGT: x           Hemoglobin A1C:       PT/INR - ( 28 May 2024 09:06 )   PT: 11.5 sec;   INR: 0.97 ratio               RADIOLOGY  < from: CT Head No Cont (05.28.24 @ 09:41) >      IMPRESSION:  Mild chronic microvascular changes without evidence of an   acute transcortical infarction or hemorrhage.    --- End of Report ---      < end of copied text >  < from: MR Head No Cont (11.03.23 @ 16:06) >  IMPRESSION: mild to moderate periventricular, deep and scattered   subcortical white matter ischemia. 1.7 cm extra-axial probable meningioma   along the medial inferior LEFT frontal region, larger than prior MRI. IV   contrast can be utilized for further evaluation.    --- End of Report ---    < end of copied text >

## 2024-05-29 NOTE — PHYSICAL THERAPY INITIAL EVALUATION ADULT - GENERAL OBSERVATIONS, REHAB EVAL
Chart (EMR) reviewed. Received sitting at bedside chair c lap belt donned, NAD. +heplock, +primafit. Alert. Legally blind. Ox1. Periods of confusion noted. Able to follow simple commands/directions.

## 2024-05-29 NOTE — CONSULT NOTE ADULT - ASSESSMENT
89 years old female with h/o HTN, HLD, dementia ( A&O x 1-2 baseline), focal seizure ( refused medications at home) was brought in to ED for witness seizure for 2 minutes. Patient refused to take seizure med for 2 weeks. Now back on Depakote 250 mg BID. Neuro exam cognitive defiicts, blind   [facility] chronic changes      Impression: seizure in setting of not taking mediciation    Depakote 250 mg BID restarted  routine EEG  -B12, folate, TSH   -In order to enhance patient's overall well-being and clinical course, please try avoiding benzodiazepines, anticholinergics, and antihistamines (Can cause worsening confusion/delirium). Additionally, continue reorientation, supportive care, maintaining regular sleep/wake cycle, and optimizing nutritional/medical factors.

## 2024-05-29 NOTE — PHYSICAL THERAPY INITIAL EVALUATION ADULT - ADDITIONAL COMMENTS
As per daughter (Kamilah), pt lives alone in a PH c ~2 PAULIE with HRs and 1 flight of steps to 2nd floor where bedroom is located, however pt stays on main level of house. Pt has a HHA ~8 hours/7 days/week and family comes when HHA leaves. Pt needs assistance c ADL's, and handheld assist c ambulation for directions due to blindness.

## 2024-05-30 PROCEDURE — 99232 SBSQ HOSP IP/OBS MODERATE 35: CPT

## 2024-05-30 RX ADMIN — DIVALPROEX SODIUM 250 MILLIGRAM(S): 500 TABLET, DELAYED RELEASE ORAL at 17:46

## 2024-05-30 RX ADMIN — Medication 25 MILLIGRAM(S): at 05:45

## 2024-05-30 RX ADMIN — DORZOLAMIDE HYDROCHLORIDE 1 DROP(S): 20 SOLUTION/ DROPS OPHTHALMIC at 21:12

## 2024-05-30 RX ADMIN — Medication 25 MILLIGRAM(S): at 17:46

## 2024-05-30 RX ADMIN — ENOXAPARIN SODIUM 40 MILLIGRAM(S): 100 INJECTION SUBCUTANEOUS at 13:52

## 2024-05-30 RX ADMIN — DIVALPROEX SODIUM 250 MILLIGRAM(S): 500 TABLET, DELAYED RELEASE ORAL at 05:45

## 2024-05-30 RX ADMIN — Medication 81 MILLIGRAM(S): at 13:52

## 2024-05-30 RX ADMIN — Medication 3 MILLIGRAM(S): at 21:13

## 2024-05-30 RX ADMIN — AMLODIPINE BESYLATE 10 MILLIGRAM(S): 2.5 TABLET ORAL at 05:45

## 2024-05-30 RX ADMIN — ATORVASTATIN CALCIUM 20 MILLIGRAM(S): 80 TABLET, FILM COATED ORAL at 21:11

## 2024-05-31 ENCOUNTER — TRANSCRIPTION ENCOUNTER (OUTPATIENT)
Age: 89
End: 2024-05-31

## 2024-05-31 VITALS
HEART RATE: 66 BPM | TEMPERATURE: 98 F | RESPIRATION RATE: 16 BRPM | OXYGEN SATURATION: 99 % | DIASTOLIC BLOOD PRESSURE: 88 MMHG | SYSTOLIC BLOOD PRESSURE: 127 MMHG

## 2024-05-31 PROCEDURE — 99239 HOSP IP/OBS DSCHRG MGMT >30: CPT

## 2024-05-31 RX ORDER — AMLODIPINE BESYLATE 2.5 MG/1
1 TABLET ORAL
Qty: 0 | Refills: 0 | DISCHARGE
Start: 2024-05-31

## 2024-05-31 RX ORDER — DIVALPROEX SODIUM 500 MG/1
1 TABLET, DELAYED RELEASE ORAL
Qty: 0 | Refills: 0 | DISCHARGE
Start: 2024-05-31

## 2024-05-31 RX ORDER — DIVALPROEX SODIUM 500 MG/1
2 TABLET, DELAYED RELEASE ORAL
Qty: 120 | Refills: 0
Start: 2024-05-31 | End: 2024-06-29

## 2024-05-31 RX ORDER — METOPROLOL TARTRATE 50 MG
1 TABLET ORAL
Qty: 0 | Refills: 0 | DISCHARGE
Start: 2024-05-31

## 2024-05-31 RX ORDER — ASPIRIN/CALCIUM CARB/MAGNESIUM 324 MG
1 TABLET ORAL
Qty: 0 | Refills: 0 | DISCHARGE
Start: 2024-05-31

## 2024-05-31 RX ORDER — ATORVASTATIN CALCIUM 80 MG/1
1 TABLET, FILM COATED ORAL
Qty: 0 | Refills: 0 | DISCHARGE
Start: 2024-05-31

## 2024-05-31 RX ADMIN — DORZOLAMIDE HYDROCHLORIDE 1 DROP(S): 20 SOLUTION/ DROPS OPHTHALMIC at 08:29

## 2024-05-31 RX ADMIN — ENOXAPARIN SODIUM 40 MILLIGRAM(S): 100 INJECTION SUBCUTANEOUS at 11:45

## 2024-05-31 RX ADMIN — AMLODIPINE BESYLATE 10 MILLIGRAM(S): 2.5 TABLET ORAL at 05:52

## 2024-05-31 RX ADMIN — DIVALPROEX SODIUM 250 MILLIGRAM(S): 500 TABLET, DELAYED RELEASE ORAL at 05:51

## 2024-05-31 RX ADMIN — Medication 25 MILLIGRAM(S): at 05:52

## 2024-05-31 RX ADMIN — Medication 81 MILLIGRAM(S): at 11:45

## 2024-05-31 NOTE — DISCHARGE NOTE PROVIDER - HOSPITAL COURSE
89 years old female with h/o HTN, HLD, dementia ( A&O x 1-2 baseline), focal seizure ( refused medications at home) was brought in to ED for witness seizure for 2 minutes. Patient refused to take seizure med for 2 weeks. Denied any fever, chills, nausea, vomiting or diarrhea.  Hypertensive upon arrival, afebrile, sat well at RA. No leukocytosis, plt 207, lactate 5.1---> 4, Cr1.058, TSH 4.19, phosphorus 2.2. UA not suggestive of UTI. CT head with no acute pathology. Mild chronic microvascular changes only noted. Patients hospital course has remained unremarkable since re-starting anti seizure medication and maintaining compliance. Patient seen and examined by Dr. Franco who agrees patient is stable for discharge at this time with instructions to continue with medications as prescribed and follow up with specialists outpatient. 89 years old female with h/o HTN, HLD, dementia ( A&O x 1-2 baseline), focal seizure ( refused medications at home) was brought in to ED for witness seizure for 2 minutes. Patient refused to take seizure med for 2 weeks. Denied any fever, chills, nausea, vomiting or diarrhea.  Hypertensive upon arrival, afebrile, sat well at RA. No leukocytosis, plt 207, lactate 5.1---> 4, Cr1.058, TSH 4.19, phosphorus 2.2. UA not suggestive of UTI. CT head with no acute pathology. Mild chronic microvascular changes only noted. Patients hospital course has remained unremarkable since re-starting anti seizure medication and maintaining compliance. lalita change to depakote sprinkles as they are easier for patient to take.  Patient seen and examined by Dr. Franco who agrees patient is stable for discharge at this time with instructions to continue with medications as prescribed and follow up with specialists outpatient.       pt seen and examined 45 min spent on dc planning     Lab test review, Radiology Review, Vitals review, Consultant review and discussion, Physical examination, IDR, Assessment and plan; Plan discussion with patient and family

## 2024-05-31 NOTE — DISCHARGE NOTE PROVIDER - NSDCMRMEDTOKEN_GEN_ALL_CORE_FT
amLODIPine 10 mg oral tablet: 1 tab(s) orally once a day  aspirin 81 mg oral tablet, chewable: 1 tab(s) orally once a day  atorvastatin 20 mg oral tablet: 1 tab(s) orally once a day (at bedtime)  divalproex sodium 250 mg oral delayed release tablet: 1 tab(s) orally 2 times a day  dorzolamide 2% ophthalmic solution: 1 drop(s) in each eye 2 times a day  famotidine 20 mg oral tablet: 1 tab(s) orally once a day  metoprolol tartrate 25 mg oral tablet: 1 tab(s) orally 2 times a day   amLODIPine 10 mg oral tablet: 1 tab(s) orally once a day  aspirin 81 mg oral tablet, chewable: 1 tab(s) orally once a day  atorvastatin 20 mg oral tablet: 1 tab(s) orally once a day (at bedtime)  Depakote Sprinkles 125 mg oral delayed release capsule: 2 cap(s) orally 2 times a day  dorzolamide 2% ophthalmic solution: 1 drop(s) in each eye 2 times a day  famotidine 20 mg oral tablet: 1 tab(s) orally once a day  metoprolol tartrate 25 mg oral tablet: 1 tab(s) orally 2 times a day

## 2024-05-31 NOTE — PROGRESS NOTE ADULT - ASSESSMENT
89 years old female with h/o HTN, HLD, dementia ( A&O x 1-2 baseline), focal seizure ( refused medications at home) was brought in to ED for witness seizure for 2 minutes. Patient refused to take seizure med for 2 weeks. Denied any fever, chills, nausea, vomiting or diarrhea.  Hypertensive upon arrival, afebrile, sat well at RA. No leukocytosis, plt 207, lactate 5.1---> 4, Cr1.058, TSH 4.19, phosphorus 2.2. UA not suggestive of UTI. CT head with no acute pathology. Mild chronic microvascular changes       Problem/Plan - 1:  ·  Problem: AMS (altered mental status).   ·  Plan: brought in with witness seizure with increased confusion  Patient is refusing depakote at home for 2 weeks  CT head  ( I personally review) with no acute pathology. Mild chronic microvascular changes  Metabolic encephalopathy due to seizure  resume depakote  Monitor mental status.     Problem/Plan - 2:  ·  Problem: Seizure.   ·  Plan: due to not taking Depakote at home  EEG with no active seizure in 04/2024  Resume Depakote  ED will call neurology  Unclear the benefit of repeating EEG here since seizure is due to not taking medication. Will hold off pending neurology recommendation.     Problem/Plan - 3:  ·  Problem: Hypophosphatemia.   ·  Plan: replace with IV potassium phosphate  monitor and replace serum electrolytes as needed.     Problem/Plan - 4:  ·  Problem: Polyuria.   ·  Plan: patient has urine output of 3.5L within 3-4 hours in ED  Repeat BMP, check serum and urine osmolality  IV LR  closely monitor serum electrolytes, intake and output  nephrology consulted.     Problem/Plan - 5:  ·  Problem: Benign essential HTN.   ·  Plan: monitor BP and titrate BP med.     Problem/Plan - 6:  ·  Problem: Hyperlipidemia, unspecified.   ·  Plan: on staitn.     Problem/Plan - 7:  ·  Problem: Dementia.   ·  Plan: supportive care, at risk for delirium.
89 years old female with h/o HTN, HLD, dementia ( A&O x 1-2 baseline), focal seizure ( refused medications at home) was brought in to ED for witness seizure for 2 minutes. Patient refused to take seizure med for 2 weeks. Now back on Depakote 250 mg BID. Neuro exam cognitive defiicts, blind   [facility] chronic changes      Impression: seizure in setting of not taking mediciation    Depakote 250 mg BID restarted  routine EEG  -B12, folate, TSH   -In order to enhance patient's overall well-being and clinical course, please try avoiding benzodiazepines, anticholinergics, and antihistamines (Can cause worsening confusion/delirium). Additionally, continue reorientation, supportive care, maintaining regular sleep/wake cycle, and optimizing nutritional/medical factors.   
89 years old female with h/o HTN, HLD, dementia ( A&O x 1-2 baseline), focal seizure ( refused medications at home) was brought in to ED for witness seizure for 2 minutes. Patient refused to take seizure med for 2 weeks. Now back on Depakote 250 mg BID. Neuro exam cognitive defiicts, blind   [facility] chronic changes      Impression: seizure in setting of not taking mediciation    Depakote 250 mg BID restarted  routine EEG  -B12, folate, TSH   -In order to enhance patient's overall well-being and clinical course, please try avoiding benzodiazepines, anticholinergics, and antihistamines (Can cause worsening confusion/delirium). Additionally, continue reorientation, supportive care, maintaining regular sleep/wake cycle, and optimizing nutritional/medical factors.   
89 years old female with h/o HTN, HLD, dementia ( A&O x 1-2 baseline), focal seizure ( refused medications at home) was brought in to ED for witness seizure for 2 minutes. Patient refused to take seizure med for 2 weeks. Denied any fever, chills, nausea, vomiting or diarrhea.  Hypertensive upon arrival, afebrile, sat well at RA. No leukocytosis, plt 207, lactate 5.1---> 4, Cr1.058, TSH 4.19, phosphorus 2.2. UA not suggestive of UTI. CT head with no acute pathology. Mild chronic microvascular changes       Problem/Plan - 1:  ·  Problem: AMS (altered mental status).   ·  Plan: brought in with witness seizure with increased confusion  Patient is refusing depakote at home for 2 weeks  CT head  ( I personally review) with no acute pathology. Mild chronic microvascular changes  Metabolic encephalopathy due to seizure  resume depakote  Monitor mental status.    DC PLAN HOME WHEN HHA reinstated      Problem/Plan - 2:  ·  Problem: Seizure.   ·  Plan: due to not taking Depakote at home  EEG with no active seizure in 04/2024  Resume Depakote  ED will call neurology  Unclear the benefit of repeating EEG here since seizure is due to not taking medication. Will hold off pending neurology recommendation.     Problem/Plan - 3:  ·  Problem: Hypophosphatemia.   ·  Plan: replace with IV potassium phosphate  monitor and replace serum electrolytes as needed.     Problem/Plan - 4:  ·  Problem: Polyuria.   ·  Plan: patient has urine output of 3.5L within 3-4 hours in ED  Repeat BMP, check serum and urine osmolality  IV LR  closely monitor serum electrolytes, intake and output  nephrology consulted.     Problem/Plan - 5:  ·  Problem: Benign essential HTN.   ·  Plan: monitor BP and titrate BP med.     Problem/Plan - 6:  ·  Problem: Hyperlipidemia, unspecified.   ·  Plan: on staitn.     Problem/Plan - 7:  ·  Problem: Dementia.   ·  Plan: supportive care, at risk for delirium.

## 2024-05-31 NOTE — PROGRESS NOTE ADULT - SUBJECTIVE AND OBJECTIVE BOX
Patient seen and examined this am. No new events    MEDICATIONS:    acetaminophen     Tablet .. 650 milliGRAM(s) Oral every 6 hours PRN  amLODIPine   Tablet 10 milliGRAM(s) Oral daily  aspirin  chewable 81 milliGRAM(s) Oral daily  atorvastatin 20 milliGRAM(s) Oral at bedtime  divalproex  milliGRAM(s) Oral two times a day  dorzolamide 2% Ophthalmic Solution 1 Drop(s) Both EYES <User Schedule>  enoxaparin Injectable 40 milliGRAM(s) SubCutaneous every 24 hours  melatonin 3 milliGRAM(s) Oral at bedtime PRN  metoprolol tartrate 25 milliGRAM(s) Oral two times a day      LABS:                          10.9   6.39  )-----------( 191      ( 29 May 2024 08:02 )             32.9     05-29    139  |  106  |  12  ----------------------------<  102<H>  3.2<L>   |  25  |  0.89    Ca    9.2      29 May 2024 15:38  Phos  2.9     05-29  Mg     1.9     05-29    TPro  7.1  /  Alb  2.9<L>  /  TBili  0.7  /  DBili  x   /  AST  21  /  ALT  18  /  AlkPhos  59  05-29    CAPILLARY BLOOD GLUCOSE          Urinalysis Basic - ( 29 May 2024 15:38 )    Color: x / Appearance: x / SG: x / pH: x  Gluc: 102 mg/dL / Ketone: x  / Bili: x / Urobili: x   Blood: x / Protein: x / Nitrite: x   Leuk Esterase: x / RBC: x / WBC x   Sq Epi: x / Non Sq Epi: x / Bacteria: x      I&O's Summary    29 May 2024 07:01  -  30 May 2024 07:00  --------------------------------------------------------  IN: 2100 mL / OUT: 1850 mL / NET: 250 mL      Vital Signs Last 24 Hrs  T(C): 36.7 (30 May 2024 11:06), Max: 37.5 (29 May 2024 23:20)  T(F): 98 (30 May 2024 11:06), Max: 99.5 (29 May 2024 23:20)  HR: 63 (30 May 2024 13:15) (63 - 86)  BP: 144/82 (30 May 2024 13:15) (115/65 - 157/79)  BP(mean): --  RR: 17 (30 May 2024 11:06) (17 - 18)  SpO2: 97% (30 May 2024 13:15) (96% - 98%)    Parameters below as of 30 May 2024 13:15  Patient On (Oxygen Delivery Method): room air            On Neurological Examination:    Mental Status - Patient is alert, awake, oriented X2 follows simple commands  Cranial Nerves - b;ind , EOMI, , V1-V3 intact, no gross facial asymmetry, tongue/uvula midline    Motor Exam -   no drift arthritic    Sensory    Intact to light touch and pinprick bilaterally    Coord: FTN intact bilaterally     Gait -  deferred    GENERAL Exam:     Nontoxic , No Acute Distress   	  HEENT:  normocephalic, atraumatic  		  LUNGS:	Clear bilaterally  No Wheeze    	  HEART:	 Normal S1S2   No murmur RRR        	  GI/ ABDOMEN:  Soft  Non tender    EXTREMITIES:   No Edema  No Clubbing  No Cyanosis No Edema    MUSCULOSKELETAL: Normal Range of Motion  	   SKIN:      Normal   No Ecchymosis     RADIOLOGY  < from: CT Head No Cont (05.28.24 @ 09:41) >      IMPRESSION:  Mild chronic microvascular changes without evidence of an   acute transcortical infarction or hemorrhage.    --- End of Report ---      < end of copied text >  < from: MR Head No Cont (11.03.23 @ 16:06) >  IMPRESSION: mild to moderate periventricular, deep and scattered   subcortical white matter ischemia. 1.7 cm extra-axial probable meningioma   along the medial inferior LEFT frontal region, larger than prior MRI. IV   contrast can be utilized for further evaluation.    --- End of Report ---    < end of copied text >                
Patient seen and examined this am. No new events    MEDICATIONS:    acetaminophen     Tablet .. 650 milliGRAM(s) Oral every 6 hours PRN  amLODIPine   Tablet 10 milliGRAM(s) Oral daily  aspirin  chewable 81 milliGRAM(s) Oral daily  atorvastatin 20 milliGRAM(s) Oral at bedtime  divalproex  milliGRAM(s) Oral two times a day  dorzolamide 2% Ophthalmic Solution 1 Drop(s) Both EYES <User Schedule>  enoxaparin Injectable 40 milliGRAM(s) SubCutaneous every 24 hours  melatonin 3 milliGRAM(s) Oral at bedtime PRN  metoprolol tartrate 25 milliGRAM(s) Oral two times a day      LABS:      05-29    139  |  106  |  12  ----------------------------<  102<H>  3.2<L>   |  25  |  0.89    Ca    9.2      29 May 2024 15:38  Phos  2.9     05-29  Mg     1.9     05-29      CAPILLARY BLOOD GLUCOSE          Urinalysis Basic - ( 29 May 2024 15:38 )    Color: x / Appearance: x / SG: x / pH: x  Gluc: 102 mg/dL / Ketone: x  / Bili: x / Urobili: x   Blood: x / Protein: x / Nitrite: x   Leuk Esterase: x / RBC: x / WBC x   Sq Epi: x / Non Sq Epi: x / Bacteria: x      I&O's Summary    30 May 2024 07:01  -  31 May 2024 07:00  --------------------------------------------------------  IN: 0 mL / OUT: 800 mL / NET: -800 mL    31 May 2024 07:01  -  31 May 2024 14:23  --------------------------------------------------------  IN: 0 mL / OUT: 400 mL / NET: -400 mL      Vital Signs Last 24 Hrs  T(C): 36.7 (31 May 2024 11:16), Max: 36.7 (31 May 2024 11:16)  T(F): 98 (31 May 2024 11:16), Max: 98 (31 May 2024 11:16)  HR: 66 (31 May 2024 11:16) (66 - 68)  BP: 127/88 (31 May 2024 11:16) (127/88 - 149/84)  BP(mean): --  RR: 16 (31 May 2024 11:16) (16 - 18)  SpO2: 99% (31 May 2024 11:16) (97% - 99%)    Parameters below as of 31 May 2024 11:16  Patient On (Oxygen Delivery Method): room air        On Neurological Examination:    Mental Status - Patient is alert, awake, oriented X2 follows simple commands  Cranial Nerves - b;ind , EOMI, , V1-V3 intact, no gross facial asymmetry, tongue/uvula midline    Motor Exam -   no drift arthritic    Sensory    Intact to light touch and pinprick bilaterally    Coord: FTN intact bilaterally     Gait -  deferred    GENERAL Exam:     Nontoxic , No Acute Distress   	  HEENT:  normocephalic, atraumatic  		  LUNGS:	Clear bilaterally  No Wheeze    	  HEART:	 Normal S1S2   No murmur RRR        	  GI/ ABDOMEN:  Soft  Non tender    EXTREMITIES:   No Edema  No Clubbing  No Cyanosis No Edema    MUSCULOSKELETAL: Normal Range of Motion  	   SKIN:      Normal   No Ecchymosis     RADIOLOGY  < from: CT Head No Cont (05.28.24 @ 09:41) >      IMPRESSION:  Mild chronic microvascular changes without evidence of an   acute transcortical infarction or hemorrhage.    --- End of Report ---      < end of copied text >  < from: MR Head No Cont (11.03.23 @ 16:06) >  IMPRESSION: mild to moderate periventricular, deep and scattered   subcortical white matter ischemia. 1.7 cm extra-axial probable meningioma   along the medial inferior LEFT frontal region, larger than prior MRI. IV   contrast can be utilized for further evaluation.    --- End of Report ---    < end of copied text >                
Patient is a 89y old  Female who presents with a chief complaint of AMS, seizure (29 May 2024 12:14)    INTERVAL HPI/OVERNIGHT EVENTS:    MEDICATIONS  (STANDING):  amLODIPine   Tablet 10 milliGRAM(s) Oral daily  aspirin  chewable 81 milliGRAM(s) Oral daily  atorvastatin 20 milliGRAM(s) Oral at bedtime  divalproex  milliGRAM(s) Oral two times a day  dorzolamide 2% Ophthalmic Solution 1 Drop(s) Both EYES <User Schedule>  enoxaparin Injectable 40 milliGRAM(s) SubCutaneous every 24 hours  metoprolol tartrate 25 milliGRAM(s) Oral two times a day    MEDICATIONS  (PRN):  acetaminophen     Tablet .. 650 milliGRAM(s) Oral every 6 hours PRN Mild Pain (1 - 3), Moderate Pain (4 - 6)  melatonin 3 milliGRAM(s) Oral at bedtime PRN Insomnia    Allergies    penicillin (Hives)    Intolerances    Tolerated CTX 2023 (Other)    REVIEW OF SYSTEMS:  All other systems reviewed and are negative    Vital Signs Last 24 Hrs  T(C): 36.7 (30 May 2024 11:06), Max: 37.5 (29 May 2024 23:20)  T(F): 98 (30 May 2024 11:06), Max: 99.5 (29 May 2024 23:20)  HR: 63 (30 May 2024 13:15) (63 - 86)  BP: 144/82 (30 May 2024 13:15) (115/65 - 157/79)  BP(mean): --  RR: 17 (30 May 2024 11:06) (17 - 18)  SpO2: 97% (30 May 2024 13:15) (96% - 98%)    Parameters below as of 30 May 2024 13:15  Patient On (Oxygen Delivery Method): room air      Daily     Daily Weight in k.6 (30 May 2024 05:53)  I&O's Summary    29 May 2024 07:01  -  30 May 2024 07:00  --------------------------------------------------------  IN: 2100 mL / OUT: 1850 mL / NET: 250 mL      CAPILLARY BLOOD GLUCOSE        PHYSICAL EXAM:  GENERAL: NAD,    HEAD:  Atraumatic, Normocephalic  EYES: EOMI, PERRLA, conjunctiva and sclera clear  ENMT: No tonsillar erythema, exudates, or enlargement; Moist mucous membranes, Good dentition, No lesions  NECK: Supple, No JVD, Normal thyroid  NERVOUS SYSTEM:  Alert & Oriented X3, Good concentration; Motor Strength 5/5 B/L upper and lower extremities; DTRs 2+ intact and symmetric  CHEST/LUNG: Clear to percussion bilaterally; No rales, rhonchi, wheezing, or rubs  HEART: Regular rate and rhythm; No murmurs, rubs, or gallops  ABDOMEN: Soft, Nontender, Nondistended; Bowel sounds present  EXTREMITIES:  2+ Peripheral Pulses, No clubbing, cyanosis, or edema  LYMPH: No lymphadenopathy noted  SKIN: No rashes or lesions    Labs                          10.9   6.39  )-----------( 191      ( 29 May 2024 08:02 )             32.9         139  |  106  |  12  ----------------------------<  102<H>  3.2<L>   |  25  |  0.89    Ca    9.2      29 May 2024 15:38  Phos  2.9       Mg     1.9         TPro  7.1  /  Alb  2.9<L>  /  TBili  0.7  /  DBili  x   /  AST  21  /  ALT  18  /  AlkPhos  59            Urinalysis Basic - ( 29 May 2024 15:38 )    Color: x / Appearance: x / SG: x / pH: x  Gluc: 102 mg/dL / Ketone: x  / Bili: x / Urobili: x   Blood: x / Protein: x / Nitrite: x   Leuk Esterase: x / RBC: x / WBC x   Sq Epi: x / Non Sq Epi: x / Bacteria: x        Culture - Blood (collected 28 May 2024 07:40)  Source: .Blood Blood-Peripheral  Preliminary Report (30 May 2024 11:01):    No growth at 48 Hours    Culture - Blood (collected 28 May 2024 07:40)  Source: .Blood Blood-Peripheral  Preliminary Report (30 May 2024 11:01):    No growth at 48 Hours                DVT prophylaxis: > Lovenox 40mg SQ daily  > Heparin   > SCD's
Patient is a 89y old  Female who presents with a chief complaint of AMS, seizure (29 May 2024 12:01)    INTERVAL HPI/OVERNIGHT EVENTS:    MEDICATIONS  (STANDING):  amLODIPine   Tablet 10 milliGRAM(s) Oral daily  aspirin  chewable 81 milliGRAM(s) Oral daily  atorvastatin 20 milliGRAM(s) Oral at bedtime  divalproex  milliGRAM(s) Oral two times a day  dorzolamide 2% Ophthalmic Solution 1 Drop(s) Both EYES <User Schedule>  enoxaparin Injectable 40 milliGRAM(s) SubCutaneous every 24 hours  lactated ringers. 1000 milliLiter(s) (150 mL/Hr) IV Continuous <Continuous>  metoprolol tartrate 25 milliGRAM(s) Oral two times a day  potassium chloride    Tablet ER 40 milliEquivalent(s) Oral once    MEDICATIONS  (PRN):  acetaminophen     Tablet .. 650 milliGRAM(s) Oral every 6 hours PRN Mild Pain (1 - 3), Moderate Pain (4 - 6)  melatonin 3 milliGRAM(s) Oral at bedtime PRN Insomnia    Allergies    penicillin (Hives)    Intolerances    Tolerated CTX 11/2023 (Other)    REVIEW OF SYSTEMS:  All other systems reviewed and are negative    Vital Signs Last 24 Hrs  T(C): 36.9 (29 May 2024 11:43), Max: 38 (28 May 2024 14:23)  T(F): 98.4 (29 May 2024 11:43), Max: 100.4 (28 May 2024 14:23)  HR: 72 (29 May 2024 11:43) (72 - 97)  BP: 118/68 (29 May 2024 11:43) (115/62 - 168/75)  BP(mean): --  RR: 17 (29 May 2024 11:43) (17 - 20)  SpO2: 96% (29 May 2024 11:43) (95% - 99%)    Parameters below as of 29 May 2024 11:43  Patient On (Oxygen Delivery Method): room air      Daily     Daily   I&O's Summary    28 May 2024 07:01  -  29 May 2024 07:00  --------------------------------------------------------  IN: 1750 mL / OUT: 5150 mL / NET: -3400 mL      CAPILLARY BLOOD GLUCOSE        PHYSICAL EXAM:  GENERAL: NAD,    HEAD:  Atraumatic, Normocephalic  EYES: EOMI, PERRLA, conjunctiva and sclera clear  ENMT: No tonsillar erythema, exudates, or enlargement; Moist mucous membranes, Good dentition, No lesions  NECK: Supple, No JVD, Normal thyroid  NERVOUS SYSTEM:  Alert & Oriented X1 Good concentration; Motor Strength 5/5 B/L upper and lower extremities; DTRs 2+ intact and symmetric  CHEST/LUNG: Clear to percussion bilaterally; No rales, rhonchi, wheezing, or rubs  HEART: Regular rate and rhythm; No murmurs, rubs, or gallops  ABDOMEN: Soft, Nontender, Nondistended; Bowel sounds present  EXTREMITIES:  2+ Peripheral Pulses, No clubbing, cyanosis, or edema  LYMPH: No lymphadenopathy noted  SKIN: No rashes or lesions    Labs                          10.9   6.39  )-----------( 191      ( 29 May 2024 08:02 )             32.9     05-29    140  |  108  |  8   ----------------------------<  87  3.3<L>   |  24  |  0.78    Ca    9.1      29 May 2024 08:02  Phos  2.6     05-29  Mg     1.9     05-29    TPro  7.1  /  Alb  2.9<L>  /  TBili  0.7  /  DBili  x   /  AST  21  /  ALT  18  /  AlkPhos  59  05-29    PT/INR - ( 28 May 2024 09:06 )   PT: 11.5 sec;   INR: 0.97 ratio           CARDIAC MARKERS ( 28 May 2024 07:40 )  x     / x     / 216 U/L / x     / x          Urinalysis Basic - ( 29 May 2024 08:02 )    Color: x / Appearance: x / SG: x / pH: x  Gluc: 87 mg/dL / Ketone: x  / Bili: x / Urobili: x   Blood: x / Protein: x / Nitrite: x   Leuk Esterase: x / RBC: x / WBC x   Sq Epi: x / Non Sq Epi: x / Bacteria: x        Culture - Blood (collected 28 May 2024 07:40)  Source: .Blood Blood-Peripheral  Preliminary Report (29 May 2024 11:02):    No growth at 24 hours    Culture - Blood (collected 28 May 2024 07:40)  Source: .Blood Blood-Peripheral  Preliminary Report (29 May 2024 11:02):    No growth at 24 hours                DVT prophylaxis: > Lovenox 40mg SQ daily  > Heparin   > SCD's

## 2024-05-31 NOTE — DISCHARGE NOTE NURSING/CASE MANAGEMENT/SOCIAL WORK - NSDCCRTYPESERV_GEN_ALL_CORE_FT
[FreeTextEntry1] : He will stay on current meds. + the Losartan increased to  50mg QD I will see him again in 6 months to check his BP No angina  Renewed all his meds including Chantix I have restarted atorvastatin 40 mg daily. EKG today unchanged  [EKG obtained to assist in diagnosis and management of assessed problem(s)] : EKG obtained to assist in diagnosis and management of assessed problem(s) MLTC

## 2024-05-31 NOTE — DISCHARGE NOTE PROVIDER - CARE PROVIDER_API CALL
Remington Guerrier)  Neurology  [address] [facility] [address]  [place] [address]  Phone: [phone]  Fax: [phone]  Follow Up Time: 1 week

## 2024-05-31 NOTE — DISCHARGE NOTE NURSING/CASE MANAGEMENT/SOCIAL WORK - PATIENT PORTAL LINK FT
You can access the FollowMyHealth Patient Portal offered by [facility] by registering at the following website: [email or web address]. By joining [facility]’s FollowMyHealth portal, you will also be able to view your health information using other applications (apps) compatible with our system.

## 2024-05-31 NOTE — DISCHARGE NOTE PROVIDER - NSDCCPCAREPLAN_GEN_ALL_CORE_FT
PRINCIPAL DISCHARGE DIAGNOSIS  Diagnosis: Seizures  Assessment and Plan of Treatment: Secondary to medication non compliance at home. Patient has not had episodes of seizures while admitted and on her appropriate dose. Seen by Neurology inpatient who agrees with plan to continue with medications and follow up outpatient for further management of seizure disorder

## 2024-06-04 DIAGNOSIS — G93.41 METABOLIC ENCEPHALOPATHY: ICD-10-CM

## 2024-06-04 DIAGNOSIS — Z74.1 NEED FOR ASSISTANCE WITH PERSONAL CARE: ICD-10-CM

## 2024-06-04 DIAGNOSIS — F03.90 UNSPECIFIED DEMENTIA, UNSPECIFIED SEVERITY, WITHOUT BEHAVIORAL DISTURBANCE, PSYCHOTIC DISTURBANCE, MOOD DISTURBANCE, AND ANXIETY: ICD-10-CM

## 2024-06-04 DIAGNOSIS — E78.5 HYPERLIPIDEMIA, UNSPECIFIED: ICD-10-CM

## 2024-06-04 DIAGNOSIS — Z11.52 ENCOUNTER FOR SCREENING FOR COVID-19: ICD-10-CM

## 2024-06-04 DIAGNOSIS — Z91.148 PATIENT'S OTHER NONCOMPLIANCE WITH MEDICATION REGIMEN FOR OTHER REASON: ICD-10-CM

## 2024-06-04 DIAGNOSIS — I10 ESSENTIAL (PRIMARY) HYPERTENSION: ICD-10-CM

## 2024-06-04 DIAGNOSIS — I47.10 SUPRAVENTRICULAR TACHYCARDIA, UNSPECIFIED: ICD-10-CM

## 2024-06-04 DIAGNOSIS — G40.909 EPILEPSY, UNSPECIFIED, NOT INTRACTABLE, WITHOUT STATUS EPILEPTICUS: ICD-10-CM

## 2024-06-04 DIAGNOSIS — H40.9 UNSPECIFIED GLAUCOMA: ICD-10-CM

## 2024-06-04 DIAGNOSIS — R56.9 UNSPECIFIED CONVULSIONS: ICD-10-CM

## 2024-06-04 DIAGNOSIS — E83.39 OTHER DISORDERS OF PHOSPHORUS METABOLISM: ICD-10-CM

## 2024-06-04 DIAGNOSIS — H54.8 LEGAL BLINDNESS, AS DEFINED IN USA: ICD-10-CM

## 2024-06-04 DIAGNOSIS — Z79.82 LONG TERM (CURRENT) USE OF ASPIRIN: ICD-10-CM

## 2024-06-04 DIAGNOSIS — Z88.0 ALLERGY STATUS TO PENICILLIN: ICD-10-CM

## 2024-09-11 NOTE — ED ADULT TRIAGE NOTE - BEFAST ARM SIDE DRIFT
[FreeTextEntry1] : 68 -year-old  female patient with history of dyslipidemia, osteopenia, history of vertigo came for preop cardiac evaluation prior to her probably cone biopsy and surgery, she was found to have abnormal cells on colposcopy.  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  Short of breath usual exertion on more then usual exertion but denies any chest pain, PND, orthopnea, diaphoresis, dizziness, palpitations, pedal edema.  No new symptoms.  Lipid panel on December 11, 2023 confirm LDL 75, significant improvement on Crestor and she is tolerating very well    April 21, 2023    CT angio of coronaries confirmed calcium score 38, she does have plaquing less than 30%, no myocardial hypoperfusion. March 13, 2023    echocardiogram showed normal LV size, LV thickness, and wall motion, LVEF 65% without signal valvular abnormality.   March 13, 2023    a nuclear stress was done using [place] protocol; she exercised for 5 minutes with peak heart rate 164 bpm, peak blood pressure 1 7 2/100 mmHg; perfusion scan showed apical septal as well as apical anterior wall ischemia  No prior CHF, MI, syncope No

## 2024-11-21 ENCOUNTER — INPATIENT (INPATIENT)
Facility: HOSPITAL | Age: 88
LOS: 5 days | Discharge: HOME HEALTH | End: 2024-11-27
Attending: STUDENT IN AN ORGANIZED HEALTH CARE EDUCATION/TRAINING PROGRAM | Admitting: STUDENT IN AN ORGANIZED HEALTH CARE EDUCATION/TRAINING PROGRAM
Payer: MEDICARE

## 2024-11-21 VITALS
HEART RATE: 93 BPM | WEIGHT: 141.98 LBS | TEMPERATURE: 99 F | HEIGHT: 64 IN | DIASTOLIC BLOOD PRESSURE: 91 MMHG | OXYGEN SATURATION: 99 % | SYSTOLIC BLOOD PRESSURE: 149 MMHG | RESPIRATION RATE: 18 BRPM

## 2024-11-21 DIAGNOSIS — I10 ESSENTIAL (PRIMARY) HYPERTENSION: ICD-10-CM

## 2024-11-21 DIAGNOSIS — F03.918 UNSPECIFIED DEMENTIA, UNSPECIFIED SEVERITY, WITH OTHER BEHAVIORAL DISTURBANCE: ICD-10-CM

## 2024-11-21 DIAGNOSIS — E78.5 HYPERLIPIDEMIA, UNSPECIFIED: ICD-10-CM

## 2024-11-21 DIAGNOSIS — G40.909 EPILEPSY, UNSPECIFIED, NOT INTRACTABLE, WITHOUT STATUS EPILEPTICUS: ICD-10-CM

## 2024-11-21 LAB
ALBUMIN SERPL ELPH-MCNC: 2.8 G/DL — LOW (ref 3.3–5)
ALP SERPL-CCNC: 59 U/L — SIGNIFICANT CHANGE UP (ref 40–120)
ALT FLD-CCNC: 17 U/L — SIGNIFICANT CHANGE UP (ref 12–78)
AMPHET UR-MCNC: NEGATIVE — SIGNIFICANT CHANGE UP
ANION GAP SERPL CALC-SCNC: 7 MMOL/L — SIGNIFICANT CHANGE UP (ref 5–17)
APPEARANCE UR: ABNORMAL
AST SERPL-CCNC: 24 U/L — SIGNIFICANT CHANGE UP (ref 15–37)
BACTERIA # UR AUTO: ABNORMAL /HPF
BARBITURATES UR SCN-MCNC: NEGATIVE — SIGNIFICANT CHANGE UP
BASOPHILS # BLD AUTO: 0.03 K/UL — SIGNIFICANT CHANGE UP (ref 0–0.2)
BASOPHILS NFR BLD AUTO: 0.7 % — SIGNIFICANT CHANGE UP (ref 0–2)
BENZODIAZ UR-MCNC: NEGATIVE — SIGNIFICANT CHANGE UP
BILIRUB SERPL-MCNC: 0.3 MG/DL — SIGNIFICANT CHANGE UP (ref 0.2–1.2)
BILIRUB UR-MCNC: NEGATIVE — SIGNIFICANT CHANGE UP
BUN SERPL-MCNC: 21 MG/DL — SIGNIFICANT CHANGE UP (ref 7–23)
CALCIUM SERPL-MCNC: 8.7 MG/DL — SIGNIFICANT CHANGE UP (ref 8.5–10.1)
CHLORIDE SERPL-SCNC: 109 MMOL/L — HIGH (ref 96–108)
CO2 SERPL-SCNC: 26 MMOL/L — SIGNIFICANT CHANGE UP (ref 22–31)
COCAINE METAB.OTHER UR-MCNC: NEGATIVE — SIGNIFICANT CHANGE UP
COLOR SPEC: YELLOW — SIGNIFICANT CHANGE UP
CREAT SERPL-MCNC: 0.96 MG/DL — SIGNIFICANT CHANGE UP (ref 0.5–1.3)
DIFF PNL FLD: NEGATIVE — SIGNIFICANT CHANGE UP
EGFR: 56 ML/MIN/1.73M2 — LOW
EOSINOPHIL # BLD AUTO: 0.25 K/UL — SIGNIFICANT CHANGE UP (ref 0–0.5)
EOSINOPHIL NFR BLD AUTO: 5.6 % — SIGNIFICANT CHANGE UP (ref 0–6)
EPI CELLS # UR: PRESENT
GLUCOSE SERPL-MCNC: 120 MG/DL — HIGH (ref 70–99)
GLUCOSE UR QL: NEGATIVE MG/DL — SIGNIFICANT CHANGE UP
HCT VFR BLD CALC: 31.7 % — LOW (ref 34.5–45)
HGB BLD-MCNC: 10.5 G/DL — LOW (ref 11.5–15.5)
IMM GRANULOCYTES NFR BLD AUTO: 0.2 % — SIGNIFICANT CHANGE UP (ref 0–0.9)
KETONES UR-MCNC: NEGATIVE MG/DL — SIGNIFICANT CHANGE UP
LEUKOCYTE ESTERASE UR-ACNC: ABNORMAL
LYMPHOCYTES # BLD AUTO: 1.45 K/UL — SIGNIFICANT CHANGE UP (ref 1–3.3)
LYMPHOCYTES # BLD AUTO: 32.5 % — SIGNIFICANT CHANGE UP (ref 13–44)
MAGNESIUM SERPL-MCNC: 1.9 MG/DL — SIGNIFICANT CHANGE UP (ref 1.6–2.6)
MCHC RBC-ENTMCNC: 31.4 PG — SIGNIFICANT CHANGE UP (ref 27–34)
MCHC RBC-ENTMCNC: 33.1 G/DL — SIGNIFICANT CHANGE UP (ref 32–36)
MCV RBC AUTO: 94.9 FL — SIGNIFICANT CHANGE UP (ref 80–100)
METHADONE UR-MCNC: NEGATIVE — SIGNIFICANT CHANGE UP
MONOCYTES # BLD AUTO: 0.85 K/UL — SIGNIFICANT CHANGE UP (ref 0–0.9)
MONOCYTES NFR BLD AUTO: 19.1 % — HIGH (ref 2–14)
NEUTROPHILS # BLD AUTO: 1.87 K/UL — SIGNIFICANT CHANGE UP (ref 1.8–7.4)
NEUTROPHILS NFR BLD AUTO: 41.9 % — LOW (ref 43–77)
NITRITE UR-MCNC: NEGATIVE — SIGNIFICANT CHANGE UP
NRBC # BLD: 0 /100 WBCS — SIGNIFICANT CHANGE UP (ref 0–0)
OPIATES UR-MCNC: NEGATIVE — SIGNIFICANT CHANGE UP
PCP SPEC-MCNC: SIGNIFICANT CHANGE UP
PCP UR-MCNC: NEGATIVE — SIGNIFICANT CHANGE UP
PH UR: 7 — SIGNIFICANT CHANGE UP (ref 5–8)
PLATELET # BLD AUTO: 112 K/UL — LOW (ref 150–400)
POTASSIUM SERPL-MCNC: 3.8 MMOL/L — SIGNIFICANT CHANGE UP (ref 3.5–5.3)
POTASSIUM SERPL-SCNC: 3.8 MMOL/L — SIGNIFICANT CHANGE UP (ref 3.5–5.3)
PROT SERPL-MCNC: 7 GM/DL — SIGNIFICANT CHANGE UP (ref 6–8.3)
PROT UR-MCNC: NEGATIVE MG/DL — SIGNIFICANT CHANGE UP
RBC # BLD: 3.34 M/UL — LOW (ref 3.8–5.2)
RBC # FLD: 15.4 % — HIGH (ref 10.3–14.5)
RBC CASTS # UR COMP ASSIST: 3 /HPF — SIGNIFICANT CHANGE UP (ref 0–4)
SODIUM SERPL-SCNC: 142 MMOL/L — SIGNIFICANT CHANGE UP (ref 135–145)
SP GR SPEC: 1.01 — SIGNIFICANT CHANGE UP (ref 1–1.03)
THC UR QL: NEGATIVE — SIGNIFICANT CHANGE UP
UROBILINOGEN FLD QL: 1 MG/DL — SIGNIFICANT CHANGE UP (ref 0.2–1)
VALPROATE SERPL-MCNC: 84 UG/ML — SIGNIFICANT CHANGE UP (ref 50–100)
WBC # BLD: 4.46 K/UL — SIGNIFICANT CHANGE UP (ref 3.8–10.5)
WBC # FLD AUTO: 4.46 K/UL — SIGNIFICANT CHANGE UP (ref 3.8–10.5)
WBC UR QL: 4 /HPF — SIGNIFICANT CHANGE UP (ref 0–5)

## 2024-11-21 PROCEDURE — 99285 EMERGENCY DEPT VISIT HI MDM: CPT

## 2024-11-21 PROCEDURE — 71045 X-RAY EXAM CHEST 1 VIEW: CPT | Mod: 26

## 2024-11-21 PROCEDURE — 99222 1ST HOSP IP/OBS MODERATE 55: CPT

## 2024-11-21 PROCEDURE — 93010 ELECTROCARDIOGRAM REPORT: CPT

## 2024-11-21 RX ORDER — INFLUENZA VIRUS VACCINE 15; 15; 15; 15 UG/.5ML; UG/.5ML; UG/.5ML; UG/.5ML
0.5 SUSPENSION INTRAMUSCULAR ONCE
Refills: 0 | Status: DISCONTINUED | OUTPATIENT
Start: 2024-11-21 | End: 2024-11-27

## 2024-11-21 RX ORDER — DIVALPROEX SODIUM 500 MG
250 TABLET, DELAYED RELEASE (ENTERIC COATED) ORAL
Refills: 0 | Status: DISCONTINUED | OUTPATIENT
Start: 2024-11-21 | End: 2024-11-27

## 2024-11-21 RX ORDER — ACETAMINOPHEN, DIPHENHYDRAMINE HCL, PHENYLEPHRINE HCL 325; 25; 5 MG/1; MG/1; MG/1
3 TABLET ORAL AT BEDTIME
Refills: 0 | Status: DISCONTINUED | OUTPATIENT
Start: 2024-11-21 | End: 2024-11-27

## 2024-11-21 RX ORDER — CEFTRIAXONE SODIUM 1 G
1000 VIAL (EA) INJECTION ONCE
Refills: 0 | Status: COMPLETED | OUTPATIENT
Start: 2024-11-21 | End: 2024-11-21

## 2024-11-21 RX ORDER — METOPROLOL TARTRATE 100 MG/1
25 TABLET, FILM COATED ORAL
Refills: 0 | Status: DISCONTINUED | OUTPATIENT
Start: 2024-11-21 | End: 2024-11-27

## 2024-11-21 RX ORDER — HALOPERIDOL 2 MG
2.5 TABLET ORAL ONCE
Refills: 0 | Status: COMPLETED | OUTPATIENT
Start: 2024-11-21 | End: 2024-11-21

## 2024-11-21 RX ORDER — AMLODIPINE BESYLATE 10 MG/1
10 TABLET ORAL DAILY
Refills: 0 | Status: DISCONTINUED | OUTPATIENT
Start: 2024-11-21 | End: 2024-11-27

## 2024-11-21 RX ORDER — ACETAMINOPHEN 500MG 500 MG/1
650 TABLET, COATED ORAL EVERY 6 HOURS
Refills: 0 | Status: DISCONTINUED | OUTPATIENT
Start: 2024-11-21 | End: 2024-11-27

## 2024-11-21 RX ORDER — HEPARIN SODIUM,PORCINE 1000/ML
5000 VIAL (ML) INJECTION EVERY 8 HOURS
Refills: 0 | Status: DISCONTINUED | OUTPATIENT
Start: 2024-11-21 | End: 2024-11-27

## 2024-11-21 RX ORDER — DORZOLAMIDE HYDROCHLORIDE 20 MG/ML
1 SOLUTION OPHTHALMIC
Refills: 0 | Status: DISCONTINUED | OUTPATIENT
Start: 2024-11-21 | End: 2024-11-27

## 2024-11-21 RX ORDER — OLANZAPINE 20 MG/1
2.5 TABLET ORAL THREE TIMES A DAY
Refills: 0 | Status: DISCONTINUED | OUTPATIENT
Start: 2024-11-21 | End: 2024-11-23

## 2024-11-21 RX ADMIN — Medication 250 MILLIGRAM(S): at 18:54

## 2024-11-21 RX ADMIN — Medication 100 MILLIGRAM(S): at 02:57

## 2024-11-21 RX ADMIN — METOPROLOL TARTRATE 25 MILLIGRAM(S): 100 TABLET, FILM COATED ORAL at 18:54

## 2024-11-21 RX ADMIN — ACETAMINOPHEN, DIPHENHYDRAMINE HCL, PHENYLEPHRINE HCL 3 MILLIGRAM(S): 325; 25; 5 TABLET ORAL at 22:03

## 2024-11-21 RX ADMIN — Medication 20 MILLIGRAM(S): at 22:03

## 2024-11-21 RX ADMIN — DORZOLAMIDE HYDROCHLORIDE 1 DROP(S): 20 SOLUTION OPHTHALMIC at 20:25

## 2024-11-21 RX ADMIN — Medication 81 MILLIGRAM(S): at 11:57

## 2024-11-21 RX ADMIN — Medication 5000 UNIT(S): at 22:03

## 2024-11-21 RX ADMIN — Medication 25 MILLIGRAM(S): at 22:03

## 2024-11-21 NOTE — PATIENT PROFILE ADULT - FALL HARM RISK - HARM RISK INTERVENTIONS
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
## 2024-11-21 NOTE — BH CONSULTATION LIAISON ASSESSMENT NOTE - NSBHMSEATTEN_PSY_A_CORE
Sheila [place] [address] [facility]  : 1945  [place]  Secondary: Luca Jewell at [facility]  [address], [place] [address]  [phone]   [phone]        OUTPATIENT PHYSICAL THERAPY: Daily Treatment Note 2021  Visit Count:  7       ICD-10: Treatment Diagnosis: other lymphedema I 89.0  Precautions/Allergies:   Fenofibrate,micronized; Niacin preparations; Omega-3 acid ethyl esters; Pravastatin; Rosuvastatin; Humalog [insulin lispro]; Naproxen sodium; Simvastatin; and Statins-hmg-coa reductase inhibitors    TREATMENT PLAN:  Effective Dates: 2021 TO 10/12/2021 (90 days). Frequency/Duration: 2 times a week for 90 Day(s)       Pre-treatment Symptoms/Complaints:  The patient reports she spoke with her physician and he agrees with a trial without compression. Pain: Initial:   0/10 Post Session:  0/10   Medications Last Reviewed:  2021  Updated Objective Findings:  as below   Edema/Girth:  non-pitting    Left Right    Initial Most Recent Initial Most Recent   Upper  Extremity           Lower  Extremity               TREATMENT:     MANUAL THERAPY: (53 minutes): Complex decongestive physiotherapy was utilized and necessary because of the patient's edema. MLD for the left upper extremity. We will assess her girth Monday and if the girth is stable, we will forego the compression. [employer] Portal  Treatment/Session Summary:    · Response to Treatment:  tolerated the treatment well. Will assess girth next visit and decide if compression is warranted. · Communication/Consultation:    · Equipment provided today:  None today  · Recommendations/Intent for next treatment session: Next visit will focus on CDT.     Total Treatment Billable Duration:  53 minutes  PT Patient Time In/Time Out  Time In: 9719  Time Out: [address] [facility], PT    Future Appointments   Date Time Provider Cecilia Frias   2021 10:00 AM Evy Coronado, PT [facility]   8/12/2021 10:00 [facility]

## 2024-11-21 NOTE — CONSULT NOTE ADULT - SUBJECTIVE AND OBJECTIVE BOX
HPI: 89 year old woman with hx of dementia with behavioral disturbance, HTN, Glaucoma, seizure disorder, and blindness presenting with worsening behavioral disturbance. She has not been sleeping well for a few days and has been more aggressive. She was seen in my office in September 2024. She takes Depakote 250mg BID for seizures and Seroquel 25-50mg nightly. Patient refuses to take her medications at times. She lives alone and has a HHA 4-5 days per week. Patient noted to have UTI.    PMHx: HTN, Glaucoma, seizure disorder, blind, Dementia with behavioral disturbance  PSHx: none  PFHx: none  Social Hx: non-smoker, social etoh, no illicit drug use  Allergies: PCN  ROS: AMS  Medications: see EMR    Vitals: Temp 98.6F      RR 18       HR 93      /91  General: NAD  CV: regular rate and rhythm  Resp: no respiratory distress  Neck: supple  Neuro Exam: AOx2. States she is [place] the [place]. Follows commands. No dysarthria. No aphasia. EOM intact. No facial droop. Blind. Tongue is midline. Palate elevate symmetrically. Shoulder shrug is intact. Finger to nose and heel to shin intact. Moving all four extremities. No focal weakness. Reflexes symmetric and toes down. Gait exam deferred. Sensory intact to touch.     NIHSS- 4    CT head and labs reviewed

## 2024-11-21 NOTE — BH CONSULTATION LIAISON ASSESSMENT NOTE - NSSUICPROTFACT_PSY_ALL_CORE
Supportive social network of family or friends/Cultural, spiritual and/or moral attitudes against suicide/Positive therapeutic relationships

## 2024-11-21 NOTE — BH CONSULTATION LIAISON ASSESSMENT NOTE - NSBHCHARTREVIEWLAB_PSY_A_CORE FT
11-22    142  |  109[H]  |  13  ----------------------------<  79  3.6   |  27  |  0.86    Ca    8.9      22 Nov 2024 06:09  Phos  3.3     11-22  Mg     1.9     11-22    TPro  7.0  /  Alb  2.9[L]  /  TBili  0.4  /  DBili  x   /  AST  25  /  ALT  17  /  AlkPhos  62  11-22

## 2024-11-21 NOTE — H&P ADULT - PROBLEM SELECTOR PLAN 1
No
aggressive behavior at home  Denied any respiratory/GI/ symptoms  CXR  ( I personally review) with no focal consolidation  Continue quetiapine 25mg hs  Zyprexa 2.5mg prn for agitation  Psych consulted  Palliative care consult for GOC discussion

## 2024-11-21 NOTE — BH CONSULTATION LIAISON ASSESSMENT NOTE - NSBHCONSULTRECOMMENDOTHER_PSY_A_CORE FT
- continue depakote sprinkles 250mg PO bid with theraputic range serum level of 84; at lowest effective dose. Targets both seizure and mood lability/impulsivity   - "aggression towards caretaker" ....Pt is 89 years old, legally blind and 64 kg...not likely to be able to cause serious physical harm/injury to others.   - poor vision leads to more likelihood of confusion, disorientation, and agitation given that this is a significant sensory deficit which often results in a patient's being scared, unsure of their surroundings. There are specific caretaking recommendations when dealing with dementia patients with poor vision (available via Internet search)
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
## 2024-11-21 NOTE — BH CONSULTATION LIAISON ASSESSMENT NOTE - NSBHCHARTREVIEWVS_PSY_A_CORE FT
Vital Signs Last 24 Hrs  T(C): 36.7 (21 Nov 2024 11:07), Max: 37 (21 Nov 2024 00:31)  T(F): 98 (21 Nov 2024 11:07), Max: 98.6 (21 Nov 2024 00:31)  HR: 100 (21 Nov 2024 11:07) (75 - 100)  BP: 126/82 (21 Nov 2024 11:07) (126/82 - 149/91)  BP(mean): --  RR: 18 (21 Nov 2024 11:07) (17 - 18)  SpO2: 98% (21 Nov 2024 11:07) (97% - 99%)    Parameters below as of 21 Nov 2024 04:03  Patient On (Oxygen Delivery Method): room air

## 2024-11-21 NOTE — PATIENT PROFILE ADULT - NSPROGENSOURCEINFO_GEN_A_NUR
The patient has been examined and the H&P has been reviewed:    I concur with the findings and no changes have occurred since H&P was written.    Surgery risks, benefits and alternative options discussed and understood by patient/family.          There are no hospital problems to display for this patient.     patient
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
## 2024-11-21 NOTE — BH CONSULTATION LIAISON ASSESSMENT NOTE - RISK ASSESSMENT
low risk for intentional, planned out and executed harm to self or others given highly advanced age, physical frailty and multi-domain cognitive deficits. More likely to unintentionally/accidentally lash out at caretakers during ADL assistance or hurt self by trying to climb out of bed/pulls lines etc secondary to her chronically confused state. Chronic risk factors include Advancing Major Neurocognitive Disorder; major sensory deficit. Protective factors: female gender; no formal past psychiatric history; no known suicide attempts; no self-injurious behavior; no known premorbid hx of aggression/violence; no legal issues; no known substance use; stable domicile with 24/7 supervision via [facility] services; medication compliant; access to health services. No acute risk factors identified - baseline presentation

## 2024-11-21 NOTE — ED ADULT NURSE REASSESSMENT NOTE - NS ED NURSE REASSESS COMMENT FT1
received pt from previous RN, pt AAOX1  at this time breathing w/ ease on RA in no acute distress. 1:1 at bedside, safety precautions maintained, spoke to daughter and updated on care. Admited to floor, nursing care continues.

## 2024-11-21 NOTE — ED PROVIDER NOTE - OBJECTIVE STATEMENT
89 F pmh HTN, meningioma, seizure, legal blindness, dementia presenting to the ED for aggressive behavior. P 89 F pmh HTN, meningioma, seizure, legal blindness, dementia presenting to the ED for aggressive behavior.
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
## 2024-11-21 NOTE — ED PROVIDER NOTE - PHYSICAL EXAMINATION
General: Well appearing female in no acute distress  HEENT: Normocephalic, atraumatic. Moist mucous membranes. Oropharynx clear. No lymphadenopathy.  Eyes: No scleral icterus. EOMI. MARILOU.  Neck:. Soft and supple. Full ROM without pain. No midline tenderness  Cardiac: Regular rate and regular rhythm. No murmurs, rubs, gallops. Peripheral pulses 2+ and symmetric. No LE edema.  Resp: Lungs CTAB. Speaking in full sentences. No wheezes, rales or rhonchi.  Abd: Soft, non-tender, non-distended. No guarding or rebound. No scars, masses, or lesions.  Back: Spine midline and non-tender. No CVA tenderness.    Skin: No rashes, abrasions, or lacerations.  Neuro: AO x 1. Moves all extremities symmetrically. Motor strength and sensation grossly intact.

## 2024-11-21 NOTE — BH CONSULTATION LIAISON ASSESSMENT NOTE - CURRENT MEDICATION
MEDICATIONS  (STANDING):  amLODIPine   Tablet 10 milliGRAM(s) Oral daily  aspirin  chewable 81 milliGRAM(s) Oral daily  atorvastatin 20 milliGRAM(s) Oral at bedtime  divalproex Sprinkle 250 milliGRAM(s) Oral two times a day  dorzolamide 2% Ophthalmic Solution 1 Drop(s) Both EYES <User Schedule>  heparin   Injectable 5000 Unit(s) SubCutaneous every 8 hours  metoprolol tartrate 25 milliGRAM(s) Oral two times a day  QUEtiapine 25 milliGRAM(s) Oral at bedtime    MEDICATIONS  (PRN):  acetaminophen     Tablet .. 650 milliGRAM(s) Oral every 6 hours PRN Mild Pain (1 - 3), Moderate Pain (4 - 6)  melatonin 3 milliGRAM(s) Oral at bedtime PRN Insomnia  OLANZapine 2.5 milliGRAM(s) Oral three times a day PRN agitation
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
## 2024-11-21 NOTE — BH CONSULTATION LIAISON ASSESSMENT NOTE - SUMMARY
Advancing Major Neurocognitive Disorder with heavy vascular burden with most recent MRI showing mild to moderate periventricular, deep and scattered subcortical white matter ischemia. 1.7 cm extra-axial probable meningioma along the medial inferior LEFT frontal region, LARGER than prior MRI as of 11/3/24. Patient's poor vision also makes her mental status more susceptible to confusion, disorientation, and agitation given that this is a significant sensory deficit which often results in Patient's being scared, unsure of their surroundings.

## 2024-11-21 NOTE — H&P ADULT - NSHPPHYSICALEXAM_GEN_ALL_CORE
CONSTITUTIONAL: alert and cooperative, no acute distress.   EYES: PERRL,  no scleral icterus  ENT: Mucosa moist, tongue normal  NECK: Neck supple, trachea midline, non-tender  CARDIAC: Normal S1 and S2. Regular rate and rhythms. No Pedal edema  LUNGS: Equal air entry both lungs. No rales, rhonchi, wheezing. Normal respiratory effort.   ABDOMEN: Soft, nondistended, nontender. No guarding or rebound tenderness. No hepatomegaly or splenomegaly. Bowel sound normal.  MUSCULOSKELETAL: Normocephalic, atraumatic. No significant deformity or joint abnormality  NEUROLOGICAL: No gross motor or sensory deficits  SKIN: no lesions or eruptions. Normal turgor  PSYCHIATRIC: A&O x 1-2, appropriate mood and affect

## 2024-11-21 NOTE — BH CONSULTATION LIAISON ASSESSMENT NOTE - OTHER
24/7 A  lower end of fair  neurodegenerative illness / dementia  "fine"  limited given poor vision  appropriate  deferred  confabulation  tenuous

## 2024-11-21 NOTE — BH CONSULTATION LIAISON ASSESSMENT NOTE - NSBHCHARTREVIEWINVESTIGATE_PSY_A_CORE FT
CT Head No Cont (05.28.24 @ 09:41) Mild chronic microvascular changes without evidence of an acute transcortical infarction or hemorrhage.

## 2024-11-22 DIAGNOSIS — Z51.5 ENCOUNTER FOR PALLIATIVE CARE: ICD-10-CM

## 2024-11-22 DIAGNOSIS — H54.8 LEGAL BLINDNESS, AS DEFINED IN USA: ICD-10-CM

## 2024-11-22 LAB
ALBUMIN SERPL ELPH-MCNC: 2.9 G/DL — LOW (ref 3.3–5)
ALP SERPL-CCNC: 62 U/L — SIGNIFICANT CHANGE UP (ref 40–120)
ALT FLD-CCNC: 17 U/L — SIGNIFICANT CHANGE UP (ref 12–78)
ANION GAP SERPL CALC-SCNC: 6 MMOL/L — SIGNIFICANT CHANGE UP (ref 5–17)
AST SERPL-CCNC: 25 U/L — SIGNIFICANT CHANGE UP (ref 15–37)
BILIRUB SERPL-MCNC: 0.4 MG/DL — SIGNIFICANT CHANGE UP (ref 0.2–1.2)
BUN SERPL-MCNC: 13 MG/DL — SIGNIFICANT CHANGE UP (ref 7–23)
CALCIUM SERPL-MCNC: 8.9 MG/DL — SIGNIFICANT CHANGE UP (ref 8.5–10.1)
CHLORIDE SERPL-SCNC: 109 MMOL/L — HIGH (ref 96–108)
CO2 SERPL-SCNC: 27 MMOL/L — SIGNIFICANT CHANGE UP (ref 22–31)
CREAT SERPL-MCNC: 0.86 MG/DL — SIGNIFICANT CHANGE UP (ref 0.5–1.3)
CULTURE RESULTS: SIGNIFICANT CHANGE UP
EGFR: 63 ML/MIN/1.73M2 — SIGNIFICANT CHANGE UP
GLUCOSE SERPL-MCNC: 79 MG/DL — SIGNIFICANT CHANGE UP (ref 70–99)
HCT VFR BLD CALC: 32.9 % — LOW (ref 34.5–45)
HGB BLD-MCNC: 10.9 G/DL — LOW (ref 11.5–15.5)
MAGNESIUM SERPL-MCNC: 1.9 MG/DL — SIGNIFICANT CHANGE UP (ref 1.6–2.6)
MCHC RBC-ENTMCNC: 30.8 PG — SIGNIFICANT CHANGE UP (ref 27–34)
MCHC RBC-ENTMCNC: 33.1 G/DL — SIGNIFICANT CHANGE UP (ref 32–36)
MCV RBC AUTO: 92.9 FL — SIGNIFICANT CHANGE UP (ref 80–100)
NRBC # BLD: 0 /100 WBCS — SIGNIFICANT CHANGE UP (ref 0–0)
PHOSPHATE SERPL-MCNC: 3.3 MG/DL — SIGNIFICANT CHANGE UP (ref 2.5–4.5)
PLATELET # BLD AUTO: SIGNIFICANT CHANGE UP K/UL (ref 150–400)
POTASSIUM SERPL-MCNC: 3.6 MMOL/L — SIGNIFICANT CHANGE UP (ref 3.5–5.3)
POTASSIUM SERPL-SCNC: 3.6 MMOL/L — SIGNIFICANT CHANGE UP (ref 3.5–5.3)
PROT SERPL-MCNC: 7 GM/DL — SIGNIFICANT CHANGE UP (ref 6–8.3)
RBC # BLD: 3.54 M/UL — LOW (ref 3.8–5.2)
RBC # FLD: 14.6 % — HIGH (ref 10.3–14.5)
SODIUM SERPL-SCNC: 142 MMOL/L — SIGNIFICANT CHANGE UP (ref 135–145)
SPECIMEN SOURCE: SIGNIFICANT CHANGE UP
WBC # BLD: 4.42 K/UL — SIGNIFICANT CHANGE UP (ref 3.8–10.5)
WBC # FLD AUTO: 4.42 K/UL — SIGNIFICANT CHANGE UP (ref 3.8–10.5)

## 2024-11-22 PROCEDURE — 99223 1ST HOSP IP/OBS HIGH 75: CPT

## 2024-11-22 PROCEDURE — 99232 SBSQ HOSP IP/OBS MODERATE 35: CPT

## 2024-11-22 PROCEDURE — 99497 ADVNCD CARE PLAN 30 MIN: CPT | Mod: 25

## 2024-11-22 RX ORDER — CEFTRIAXONE SODIUM 1 G
1000 VIAL (EA) INJECTION EVERY 24 HOURS
Refills: 0 | Status: DISCONTINUED | OUTPATIENT
Start: 2024-11-22 | End: 2024-11-22

## 2024-11-22 RX ADMIN — Medication 5000 UNIT(S): at 21:13

## 2024-11-22 RX ADMIN — Medication 25 MILLIGRAM(S): at 21:13

## 2024-11-22 RX ADMIN — Medication 20 MILLIGRAM(S): at 21:13

## 2024-11-22 RX ADMIN — Medication 5000 UNIT(S): at 14:30

## 2024-11-22 RX ADMIN — Medication 81 MILLIGRAM(S): at 12:45

## 2024-11-22 RX ADMIN — Medication 250 MILLIGRAM(S): at 18:40

## 2024-11-22 RX ADMIN — DORZOLAMIDE HYDROCHLORIDE 1 DROP(S): 20 SOLUTION OPHTHALMIC at 20:06

## 2024-11-22 RX ADMIN — AMLODIPINE BESYLATE 10 MILLIGRAM(S): 10 TABLET ORAL at 06:34

## 2024-11-22 RX ADMIN — METOPROLOL TARTRATE 25 MILLIGRAM(S): 100 TABLET, FILM COATED ORAL at 18:31

## 2024-11-22 RX ADMIN — METOPROLOL TARTRATE 25 MILLIGRAM(S): 100 TABLET, FILM COATED ORAL at 06:34

## 2024-11-22 RX ADMIN — Medication 5000 UNIT(S): at 06:35

## 2024-11-22 RX ADMIN — DORZOLAMIDE HYDROCHLORIDE 1 DROP(S): 20 SOLUTION OPHTHALMIC at 08:19

## 2024-11-22 RX ADMIN — Medication 1 TABLET(S): at 18:44

## 2024-11-22 RX ADMIN — Medication 250 MILLIGRAM(S): at 06:34

## 2024-11-22 RX ADMIN — ACETAMINOPHEN, DIPHENHYDRAMINE HCL, PHENYLEPHRINE HCL 3 MILLIGRAM(S): 325; 25; 5 TABLET ORAL at 21:13
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
## 2024-11-22 NOTE — CONSULT NOTE ADULT - PROBLEM SELECTOR RECOMMENDATION 2
on depakote sprinkles, valproic acid level WNL  seizure precautions on depakote sprinkles, valproic acid level WNL  followed by neuro outpatient and had imaging done in the past with left frontal midline meningioma, family concerned about increasing size, no new imaging, last MR head on file from Nov 2023 which showed increase in size from prior imaging  seizure precautions

## 2024-11-22 NOTE — CONSULT NOTE ADULT - NS ATTEND AMEND GEN_ALL_CORE FT
89 y F hx of advancing dementia, HTN, seizures, legally blind adm for behavioral disturbance. Evidence of UTI, on antibiotics. Psychiatry and neurology eval noted and appreciated, on depakote bid, seroquel qhs. Min ambulatory with assist, has 24h HHA. Has 3 children who make decisions together. GOC discussion as above, family not ready to make any decisions about advance directives at this time. They wish to continue w current care. To return home w services once medically stable. Palliative team remains available as needed, reconsult prn.

## 2024-11-22 NOTE — CONSULT NOTE ADULT - PROBLEM SELECTOR RECOMMENDATION 4
See [facility] conversation above.  Patient has 3 children who would be surrogate decision makers.  Goal for patient to return home with current supports.  No decisions about LST, wish to continue with current medical management.      Discussed with Dr. Rajan

## 2024-11-22 NOTE — CONSULT NOTE ADULT - SUBJECTIVE AND OBJECTIVE BOX
HPI:  89 years old female with h/o HTN, HLD, focal seizure, dementia ( A&O x 1-2 ) was brought in to ED for aggressive behavior. Per daughter, patient has not been sleeping since Sunday night with worsening of aggressive behavior. Patient was hitting HHA at home and daughter has to call police. Patient denied any cough, SOB, nausea, vomiting or urinary symptoms. No fever or chills.   Hemodynamically stable, afebrile, sat well at RA. No leukocytosis, plt 112, Cr 0.96, glucose 120. UA with 4 WBC, many bacteria and has squamous cells. CXR with no focal consolidation. (21 Nov 2024 12:07)    PERTINENT PM/SXH:   Benign essential HTN    Acute glaucoma    Glaucoma    Legal blindness    HTN (hypertension)    Seizure      No significant past surgical history      FAMILY HISTORY:    ITEMS NOT CHECKED ARE NOT PRESENT    SOCIAL HISTORY:   Significant other/partner: no [ ]  Children: yes [ ]  Anglican/Spirituality: Religious  Substance hx:  [ ]   Tobacco hx:  [ ]   Alcohol hx: [ ]   Home Opioid hx:  [ ] I-Stop Reference No: Reference #: [id number]  Living Situation: [x ]Home  [ ]Long term care  [ ]Rehab [ ]Other    ADVANCE DIRECTIVES:    DNR  MOLST  [ ]  Living Will  [ ]   DECISION MAKER(s):  [ ] Health Care Proxy(s)  [x ] Surrogate(s)  [ ] Guardian           Name(s): Phone Number(s): Vinny [phone]/ [phone]    BASELINE (I)ADL(s) (prior to admission):  [place]: [ ]Total  [ ] Moderate [ ]Dependent    Allergies    penicillin (Hives)    Intolerances    Tolerated CTX 11/2023 (Other)  MEDICATIONS  (STANDING):  amLODIPine   Tablet 10 milliGRAM(s) Oral daily  aspirin  chewable 81 milliGRAM(s) Oral daily  atorvastatin 20 milliGRAM(s) Oral at bedtime  divalproex Sprinkle 250 milliGRAM(s) Oral two times a day  dorzolamide 2% Ophthalmic Solution 1 Drop(s) Both EYES <User Schedule>  heparin   Injectable 5000 Unit(s) SubCutaneous every 8 hours  influenza  Vaccine (HIGH DOSE) 0.5 milliLiter(s) IntraMuscular once  metoprolol tartrate 25 milliGRAM(s) Oral two times a day  QUEtiapine 25 milliGRAM(s) Oral at bedtime    MEDICATIONS  (PRN):  acetaminophen     Tablet .. 650 milliGRAM(s) Oral every 6 hours PRN Mild Pain (1 - 3), Moderate Pain (4 - 6)  melatonin 3 milliGRAM(s) Oral at bedtime PRN Insomnia  OLANZapine 2.5 milliGRAM(s) Oral three times a day PRN agitation    PRESENT SYMPTOMS: [ ]Unable to obtain due to poor mentation   Source if other than patient:  [ ]Family   [ ]Team     Pain: [ ] yes [x ] no  QOL impact -   Location -                    Aggravating factors -  Quality -  Radiation -  Timing-  Severity (0-10 scale):  Minimal acceptable level (0-10 scale):     PAIN AD Score: 0    [email or web address] (press ctrl +  left click to view)    Dyspnea:                           [ ]Mild [ ]Moderate [ ]Severe  Anxiety:                             [ ]Mild [ ]Moderate [ ]Severe  Fatigue:                             [ ]Mild [ ]Moderate [ ]Severe  Nausea:                             [ ]Mild [ ]Moderate [ ]Severe  Loss of appetite:              [ ]Mild [ ]Moderate [ ]Severe  Constipation:                    [ ]Mild [ ]Moderate [ ]Severe    Other Symptoms:  [ ]All other review of systems negative     Karnofsky Performance Score/Palliative Performance Status Version 2:         %    http://npcrc.org/files/news/palliative_performance_scale_ppsv2.pdf  PHYSICAL EXAM:  Vital Signs Last 24 Hrs  T(C): 36.4 (22 Nov 2024 11:10), Max: 36.6 (21 Nov 2024 23:47)  T(F): 97.5 (22 Nov 2024 11:10), Max: 97.9 (21 Nov 2024 23:47)  HR: 64 (22 Nov 2024 11:10) (64 - 100)  BP: 146/87 (22 Nov 2024 11:10) (130/92 - 158/85)  BP(mean): 12 (21 Nov 2024 15:30) (12 - 12)  RR: 17 (22 Nov 2024 11:10) (16 - 20)  SpO2: 99% (22 Nov 2024 11:10) (95% - 99%)    Parameters below as of 22 Nov 2024 11:10  Patient On (Oxygen Delivery Method): room air     I&O's Summary    21 Nov 2024 07:01 - 22 Nov 2024 07:00  --------------------------------------------------------  IN: 200 mL / OUT: 600 mL / NET: -400 mL    GENERAL:  [ ]Alert  [ ]Oriented x   [x ]Lethargic  [ ]Cachexia  [ ]Unarousable  [ x]Verbal  [ ]Non-Verbal  Behavioral:   [ ] Anxiety  [ ] Delirium [ ] Agitation [ ] Other  HEENT:  [ ]Normal   [ ]Dry mouth   [ ]ET Tube/Trach  [ ]Oral lesions  PULMONARY:   [x ]Clear  diminished bilateral lower lobes [ ]Tachypnea  [ ]Audible excessive secretions   [ ]Rhonchi        [ ]Right [ ]Left [ ]Bilateral  [ ]Crackles        [ ]Right [ ]Left [ ]Bilateral  [ ]Wheezing     [ ]Right [ ]Left [ ]Bilateral  CARDIOVASCULAR:    [x ]Regular [ ]Irregular [ ]Tachy  [ ]Davin [ ]Murmur [ ]Other  GASTROINTESTINAL:  [ x]Soft  [ ]Distended   [ ]+BS  [ x]Non tender [ ]Tender  [ ]PEG [ ]OGT/ NGT  Last BM: GENITOURINARY:  [ ]Normal [ x] Incontinent   [ ]Oliguria/Anuria   [ ]Tam  MUSCULOSKELETAL:   [ ]Normal   [ ]Weakness  [ ]Bed/Wheelchair bound [ ]Edema  NEUROLOGIC:   [ ]No focal deficits  [x] Cognitive impairment  [ ] Dysphagia [ ]Dysarthria [ ] Paresis [ ]Other   SKIN:   [ ]Normal   [ ]Pressure ulcer(s)  [ ]Rash    CRITICAL CARE:  [ ] Shock Present  [ ]Septic [ ]Cardiogenic [ ]Neurologic [ ]Hypovolemic  [ ]  Vasopressors [ ]  Inotropes   [ ] Respiratory failure present [ ] mechanical ventilation [ ] non-invasive ventilatory support [ ] High flow  [ ] Acute  [ ] Chronic [ ] Hypoxic  [ ] Hypercarbic [ ] Other  [ ] Other organ failure     LABS:                        10.9   4.42  )-----------( CLUMPED    ( 22 Nov 2024 06:09 )             32.9   11-22    142  |  109[H]  |  13  ----------------------------<  79  3.6   |  27  |  0.86    Ca    8.9      22 Nov 2024 06:09  Phos  3.3     11-22  Mg     1.9     11-22    TPro  7.0  /  Alb  2.9[L]  /  TBili  0.4  /  DBili  x   /  AST  25  /  ALT  17  /  AlkPhos  62  11-22    Urinalysis with Rflx Culture (11.21.24 @ 01:50)    Urine Appearance: Cloudy   Color: Yellow   Specific Gravity: 1.010   pH Urine: 7.0   Protein, Urine: Negative mg/dL   Glucose Qualitative, Urine: Negative mg/dL   Ketone - Urine: Negative mg/dL   Blood, Urine: Negative   Bilirubin: Negative   Urobilinogen: 1.0 mg/dL   Leukocyte Esterase Concentration: Large   Nitrite: Negative    RADIOLOGY & ADDITIONAL STUDIES:  < from: Xray Chest 1 View- PORTABLE-Urgent (Xray Chest 1 View- PORTABLE-Urgent .) (11.21.24 @ 01:51) >  IMPRESSION:  Study limited due to rotation. No gross consolidation or pleural   effusion. Atelectasis left lung base.  Heart size cannot be accurately assessed in this projection.  --- End of Report ---  < end of copied text >        PROTEIN CALORIE MALNUTRITION PRESENT: [ ] Yes [ ] No  [ ] PPSV2 < or = to 30% [ ] significant weight loss  [ ] poor nutritional intake [ ] catabolic state [ ] anasarca     Artificial Nutrition [ ]     REFERRALS:   [ ]Chaplaincy  [ ] Hospice  [ ]Child Life  [ ]Social Work  [ ]Case management [ ]Holistic Therapy     Goals of Care Document:     HPI:  89 years old female with h/o HTN, HLD, focal seizure, dementia ( A&O x 1-2 ) was brought in to ED for aggressive behavior. Per daughter, patient has not been sleeping since Sunday night with worsening of aggressive behavior. Patient was hitting HHA at home and daughter has to call police. Patient denied any cough, SOB, nausea, vomiting or urinary symptoms. No fever or chills.   Hemodynamically stable, afebrile, sat well at RA. No leukocytosis, plt 112, Cr 0.96, glucose 120. UA with 4 WBC, many bacteria and has squamous cells. CXR with no focal consolidation. (21 Nov 2024 12:07)    PERTINENT PM/SXH:   Benign essential HTN    Acute glaucoma    Glaucoma    Legal blindness    HTN (hypertension)    Seizure      No significant past surgical history      FAMILY HISTORY:    ITEMS NOT CHECKED ARE NOT PRESENT    SOCIAL HISTORY:   Significant other/partner: no [ ]  Children: yes [ ]  Sabianism/Spirituality: Presybeterian  Substance hx:  [ ]   Tobacco hx:  [ ]   Alcohol hx: [ ]   Home Opioid hx:  [ ] I-Stop Reference No: Reference #: [id number]  Living Situation: [x ]Home  [ ]Long term care  [ ]Rehab [ ]Other    ADVANCE DIRECTIVES:    DNR  MOLST  [ ]  Living Will  [ ]   DECISION MAKER(s):  [ ] Health Care Proxy(s)  [x ] Surrogate(s)  [ ] Guardian           Name(s): Phone Number(s): Vinny [phone]/ [phone]    BASELINE (I)ADL(s) (prior to admission):  [place]: [ ]Total  [x ] Moderate [ ]Dependent    Allergies    penicillin (Hives)    Intolerances    Tolerated CTX 11/2023 (Other)  MEDICATIONS  (STANDING):  amLODIPine   Tablet 10 milliGRAM(s) Oral daily  aspirin  chewable 81 milliGRAM(s) Oral daily  atorvastatin 20 milliGRAM(s) Oral at bedtime  divalproex Sprinkle 250 milliGRAM(s) Oral two times a day  dorzolamide 2% Ophthalmic Solution 1 Drop(s) Both EYES <User Schedule>  heparin   Injectable 5000 Unit(s) SubCutaneous every 8 hours  influenza  Vaccine (HIGH DOSE) 0.5 milliLiter(s) IntraMuscular once  metoprolol tartrate 25 milliGRAM(s) Oral two times a day  QUEtiapine 25 milliGRAM(s) Oral at bedtime    MEDICATIONS  (PRN):  acetaminophen     Tablet .. 650 milliGRAM(s) Oral every 6 hours PRN Mild Pain (1 - 3), Moderate Pain (4 - 6)  melatonin 3 milliGRAM(s) Oral at bedtime PRN Insomnia  OLANZapine 2.5 milliGRAM(s) Oral three times a day PRN agitation    PRESENT SYMPTOMS: [ ]Unable to obtain due to poor mentation   Source if other than patient:  [ ]Family   [ ]Team     Pain: [ ] yes [x ] no  QOL impact -   Location -                    Aggravating factors -  Quality -  Radiation -  Timing-  Severity (0-10 scale):  Minimal acceptable level (0-10 scale):     PAIN AD Score: 0    [email or web address] (press ctrl +  left click to view)    Dyspnea:                           [ ]Mild [ ]Moderate [ ]Severe  Anxiety:                             [ ]Mild [ ]Moderate [ ]Severe  Fatigue:                             [ ]Mild [ ]Moderate [ ]Severe  Nausea:                             [ ]Mild [ ]Moderate [ ]Severe  Loss of appetite:              [ ]Mild [ ]Moderate [ ]Severe  Constipation:                    [ ]Mild [ ]Moderate [ ]Severe    Other Symptoms:  [ ]All other review of systems negative     Karnofsky Performance Score/Palliative Performance Status Version 2:   50-   60   %    http://npcrc.org/files/news/palliative_performance_scale_ppsv2.pdf  PHYSICAL EXAM:  Vital Signs Last 24 Hrs  T(C): 36.4 (22 Nov 2024 11:10), Max: 36.6 (21 Nov 2024 23:47)  T(F): 97.5 (22 Nov 2024 11:10), Max: 97.9 (21 Nov 2024 23:47)  HR: 64 (22 Nov 2024 11:10) (64 - 100)  BP: 146/87 (22 Nov 2024 11:10) (130/92 - 158/85)  BP(mean): 12 (21 Nov 2024 15:30) (12 - 12)  RR: 17 (22 Nov 2024 11:10) (16 - 20)  SpO2: 99% (22 Nov 2024 11:10) (95% - 99%)    Parameters below as of 22 Nov 2024 11:10  Patient On (Oxygen Delivery Method): room air     I&O's Summary    21 Nov 2024 07:01  -  22 Nov 2024 07:00  --------------------------------------------------------  IN: 200 mL / OUT: 600 mL / NET: -400 mL    GENERAL:  [ ]Alert  [ ]Oriented x   [x ]Lethargic  [ ]Cachexia  [ ]Unarousable  [ x]Verbal  [ ]Non-Verbal  Behavioral:   [ ] Anxiety  [ ] Delirium [ ] Agitation [ ] Other  HEENT:  [ ]Normal   [ ]Dry mouth   [ ]ET Tube/Trach  [ ]Oral lesions  PULMONARY:   [x ]Clear  diminished bilateral lower lobes [ ]Tachypnea  [ ]Audible excessive secretions   [ ]Rhonchi        [ ]Right [ ]Left [ ]Bilateral  [ ]Crackles        [ ]Right [ ]Left [ ]Bilateral  [ ]Wheezing     [ ]Right [ ]Left [ ]Bilateral  CARDIOVASCULAR:    [x ]Regular [ ]Irregular [ ]Tachy  [ ]Davin [ ]Murmur [ ]Other  GASTROINTESTINAL:  [ x]Soft  [ ]Distended   [ ]+BS  [ x]Non tender [ ]Tender  [ ]PEG [ ]OGT/ NGT  Last BM: GENITOURINARY:  [ ]Normal [ x] Incontinent   [ ]Oliguria/Anuria   [ ]Tam  MUSCULOSKELETAL:   [ ]Normal   [ ]Weakness  [ ]Bed/Wheelchair bound [ ]Edema  NEUROLOGIC:   [ ]No focal deficits  [x] Cognitive impairment  [ ] Dysphagia [ ]Dysarthria [ ] Paresis [ ]Other   SKIN:   [ ]Normal   [ ]Pressure ulcer(s)  [ ]Rash    CRITICAL CARE:  [ ] Shock Present  [ ]Septic [ ]Cardiogenic [ ]Neurologic [ ]Hypovolemic  [ ]  Vasopressors [ ]  Inotropes   [ ] Respiratory failure present [ ] mechanical ventilation [ ] non-invasive ventilatory support [ ] High flow  [ ] Acute  [ ] Chronic [ ] Hypoxic  [ ] Hypercarbic [ ] Other  [ ] Other organ failure     LABS:                        10.9   4.42  )-----------( CLUMPED    ( 22 Nov 2024 06:09 )             32.9   11-22    142  |  109[H]  |  13  ----------------------------<  79  3.6   |  27  |  0.86    Ca    8.9      22 Nov 2024 06:09  Phos  3.3     11-22  Mg     1.9     11-22    TPro  7.0  /  Alb  2.9[L]  /  TBili  0.4  /  DBili  x   /  AST  25  /  ALT  17  /  AlkPhos  62  11-22    Urinalysis with Rflx Culture (11.21.24 @ 01:50)    Urine Appearance: Cloudy   Color: Yellow   Specific Gravity: 1.010   pH Urine: 7.0   Protein, Urine: Negative mg/dL   Glucose Qualitative, Urine: Negative mg/dL   Ketone - Urine: Negative mg/dL   Blood, Urine: Negative   Bilirubin: Negative   Urobilinogen: 1.0 mg/dL   Leukocyte Esterase Concentration: Large   Nitrite: Negative    RADIOLOGY & ADDITIONAL STUDIES:  < from: Xray Chest 1 View- PORTABLE-Urgent (Xray Chest 1 View- PORTABLE-Urgent .) (11.21.24 @ 01:51) >  IMPRESSION:  Study limited due to rotation. No gross consolidation or pleural   effusion. Atelectasis left lung base.  Heart size cannot be accurately assessed in this projection.  --- End of Report ---  < end of copied text >      PROTEIN CALORIE MALNUTRITION PRESENT: [ ] Yes [ ] No  [ ] PPSV2 < or = to 30% [ ] significant weight loss  [ ] poor nutritional intake [ ] catabolic state [ ] anasarca     Artificial Nutrition [ ]     REFERRALS:   [ ]Chaplaincy  [ ] Hospice  [ ]Child Life  [ ]Social Work  [ ]Case management [ ]Holistic Therapy     Goals of Care Document:     HPI:  89 years old female with h/o HTN, HLD, focal seizure, dementia ( A&O x 1-2 ) was brought in to ED for aggressive behavior. Per daughter, patient has not been sleeping since Sunday night with worsening of aggressive behavior. Patient was hitting HHA at home and daughter has to call police. Patient denied any cough, SOB, nausea, vomiting or urinary symptoms. No fever or chills.   Hemodynamically stable, afebrile, sat well at RA. No leukocytosis, plt 112, Cr 0.96, glucose 120. UA with 4 WBC, many bacteria and has squamous cells. CXR with no focal consolidation. (21 Nov 2024 12:07)    PERTINENT PM/SXH:   Benign essential HTN    Acute glaucoma    Glaucoma    Legal blindness    HTN (hypertension)    Seizure      No significant past surgical history      FAMILY HISTORY:    ITEMS NOT CHECKED ARE NOT PRESENT    SOCIAL HISTORY:   Significant other/partner: no [ ]  Children: yes [ ]  Shinto/Spirituality: Sikh  Substance hx:  [ ]   Tobacco hx:  [ ]   Alcohol hx: [ ]   Home Opioid hx:  [ ] I-Stop Reference No: Reference #: [id number]  Living Situation: [x ]Home  [ ]Long term care  [ ]Rehab [ ]Other    ADVANCE DIRECTIVES:    DNR  MOLST  [ ]  Living Will  [ ]   DECISION MAKER(s):  [ ] Health Care Proxy(s)  [x ] Surrogate(s)  [ ] Guardian           Name(s): Phone Number(s): Vinny [phone]/ [phone]    BASELINE (I)ADL(s) (prior to admission):  [place]: [ ]Total  [x ] Moderate [ ]Dependent    Allergies    penicillin (Hives)    Intolerances    Tolerated CTX 11/2023 (Other)  MEDICATIONS  (STANDING):  amLODIPine   Tablet 10 milliGRAM(s) Oral daily  aspirin  chewable 81 milliGRAM(s) Oral daily  atorvastatin 20 milliGRAM(s) Oral at bedtime  divalproex Sprinkle 250 milliGRAM(s) Oral two times a day  dorzolamide 2% Ophthalmic Solution 1 Drop(s) Both EYES <User Schedule>  heparin   Injectable 5000 Unit(s) SubCutaneous every 8 hours  influenza  Vaccine (HIGH DOSE) 0.5 milliLiter(s) IntraMuscular once  metoprolol tartrate 25 milliGRAM(s) Oral two times a day  QUEtiapine 25 milliGRAM(s) Oral at bedtime    MEDICATIONS  (PRN):  acetaminophen     Tablet .. 650 milliGRAM(s) Oral every 6 hours PRN Mild Pain (1 - 3), Moderate Pain (4 - 6)  melatonin 3 milliGRAM(s) Oral at bedtime PRN Insomnia  OLANZapine 2.5 milliGRAM(s) Oral three times a day PRN agitation    PRESENT SYMPTOMS: [ ]Unable to obtain due to poor mentation   Source if other than patient:  [ ]Family   [ ]Team     Pain: [ ] yes [x ] no  QOL impact -   Location -                    Aggravating factors -  Quality -  Radiation -  Timing-  Severity (0-10 scale):  Minimal acceptable level (0-10 scale):     PAIN AD Score: 0    [email or web address] (press ctrl +  left click to view)    Dyspnea:                           [ ]Mild [ ]Moderate [ ]Severe  Anxiety:                             [ ]Mild [ ]Moderate [ ]Severe  Fatigue:                             [ ]Mild [ ]Moderate [ ]Severe  Nausea:                             [ ]Mild [ ]Moderate [ ]Severe  Loss of appetite:              [ ]Mild [ ]Moderate [ ]Severe  Constipation:                    [ ]Mild [ ]Moderate [ ]Severe    Other Symptoms:  [ ]All other review of systems negative     Karnofsky Performance Score/Palliative Performance Status Version 2:   50-   60   %    http://npcrc.org/files/news/palliative_performance_scale_ppsv2.pdf  PHYSICAL EXAM:  Vital Signs Last 24 Hrs  T(C): 36.4 (22 Nov 2024 11:10), Max: 36.6 (21 Nov 2024 23:47)  T(F): 97.5 (22 Nov 2024 11:10), Max: 97.9 (21 Nov 2024 23:47)  HR: 64 (22 Nov 2024 11:10) (64 - 100)  BP: 146/87 (22 Nov 2024 11:10) (130/92 - 158/85)  BP(mean): 12 (21 Nov 2024 15:30) (12 - 12)  RR: 17 (22 Nov 2024 11:10) (16 - 20)  SpO2: 99% (22 Nov 2024 11:10) (95% - 99%)    Parameters below as of 22 Nov 2024 11:10  Patient On (Oxygen Delivery Method): room air     I&O's Summary    21 Nov 2024 07:01  -  22 Nov 2024 07:00  --------------------------------------------------------  IN: 200 mL / OUT: 600 mL / NET: -400 mL    GENERAL:  [ ]Alert  [ x]Oriented x1   [x ]Lethargic  [ ]Cachexia  [ ]Unarousable  [ x]Verbal  [ ]Non-Verbal  Behavioral:   [ ] Anxiety  [ ] Delirium [ ] Agitation [ ] Other  HEENT:  [ ]Normal   [ ]Dry mouth   [ ]ET Tube/Trach  [ ]Oral lesions  PULMONARY:   [x ]Clear  diminished bilateral lower lobes [ ]Tachypnea  [ ]Audible excessive secretions   [ ]Rhonchi        [ ]Right [ ]Left [ ]Bilateral  [ ]Crackles        [ ]Right [ ]Left [ ]Bilateral  [ ]Wheezing     [ ]Right [ ]Left [ ]Bilateral  CARDIOVASCULAR:    [x ]Regular [ ]Irregular [ ]Tachy  [ ]Davin [ ]Murmur [ ]Other  GASTROINTESTINAL:  [ x]Soft  [ ]Distended   [ ]+BS  [ x]Non tender [ ]Tender  [ ]PEG [ ]OGT/ NGT  Last BM: GENITOURINARY:  [ ]Normal [ x] Incontinent   [ ]Oliguria/Anuria   [ ]Tam  MUSCULOSKELETAL:   [ ]Normal   [ ]Weakness  [ ]Bed/Wheelchair bound [ ]Edema  NEUROLOGIC:   [ ]No focal deficits  [x] Cognitive impairment  [ ] Dysphagia [ ]Dysarthria [ ] Paresis [ ]Other   SKIN:   [ ]Normal   [ ]Pressure ulcer(s)  [ ]Rash    CRITICAL CARE:  [ ] Shock Present  [ ]Septic [ ]Cardiogenic [ ]Neurologic [ ]Hypovolemic  [ ]  Vasopressors [ ]  Inotropes   [ ] Respiratory failure present [ ] mechanical ventilation [ ] non-invasive ventilatory support [ ] High flow  [ ] Acute  [ ] Chronic [ ] Hypoxic  [ ] Hypercarbic [ ] Other  [ ] Other organ failure     LABS:                        10.9   4.42  )-----------( CLUMPED    ( 22 Nov 2024 06:09 )             32.9   11-22    142  |  109[H]  |  13  ----------------------------<  79  3.6   |  27  |  0.86    Ca    8.9      22 Nov 2024 06:09  Phos  3.3     11-22  Mg     1.9     11-22    TPro  7.0  /  Alb  2.9[L]  /  TBili  0.4  /  DBili  x   /  AST  25  /  ALT  17  /  AlkPhos  62  11-22    Urinalysis with Rflx Culture (11.21.24 @ 01:50)    Urine Appearance: Cloudy   Color: Yellow   Specific Gravity: 1.010   pH Urine: 7.0   Protein, Urine: Negative mg/dL   Glucose Qualitative, Urine: Negative mg/dL   Ketone - Urine: Negative mg/dL   Blood, Urine: Negative   Bilirubin: Negative   Urobilinogen: 1.0 mg/dL   Leukocyte Esterase Concentration: Large   Nitrite: Negative    RADIOLOGY & ADDITIONAL STUDIES:  < from: Xray Chest 1 View- PORTABLE-Urgent (Xray Chest 1 View- PORTABLE-Urgent .) (11.21.24 @ 01:51) >  IMPRESSION:  Study limited due to rotation. No gross consolidation or pleural   effusion. Atelectasis left lung base.  Heart size cannot be accurately assessed in this projection.  --- End of Report ---  < end of copied text >      PROTEIN CALORIE MALNUTRITION PRESENT: [ ] Yes [ ] No  [ ] PPSV2 < or = to 30% [ ] significant weight loss  [ ] poor nutritional intake [ ] catabolic state [ ] anasarca     Artificial Nutrition [ ]     REFERRALS:   [ ]Chaplaincy  [ ] Hospice  [ ]Child Life  [ ]Social Work  [ ]Case management [ ]Holistic Therapy     Goals of Care Document:

## 2024-11-22 NOTE — CONSULT NOTE ADULT - CONVERSATION DETAILS
Spoke with patient's daughters, Irais and Kamilah via phone as they were not available to speak in person.  Patient lives at home with 24 hour care since about September.  She is legally blind so she does require assistance with getting around, but is able to ambulate with assistance and get to her doctors appointments.  She is dependent for her ADLs.  They said over the past 2 months her behavior totally changed and she is more easily irritated and gets combative at times.  She said she has been through 17 aides in 14 months.  They shared their understanding that dementia is a chronic and progressive issue and that infections can cause acute changes.  They were also concerned about her meningioma and that it may have grown, but said they were told in the past she was not a surgical candidate.  They would like her to return home with current supports, but said they have discussed facility placement if needed down the line.      Asked about wishes pertaining to LST such as CPR and she said, "I'm not god" and said she would want to pursue resuscitation if needed.  Explained that interventions such as CPR and intubation may cause more harm than benefit as patient has dementia which is a terminal illness and may be difficult to get her off those supports.  She said they would make that decision when the time comes and explained that usually those are emergent situations and they would likely intervene.  She verbalized understanding.

## 2024-11-23 PROCEDURE — 99232 SBSQ HOSP IP/OBS MODERATE 35: CPT

## 2024-11-23 RX ORDER — OLANZAPINE 20 MG/1
2.5 TABLET ORAL EVERY 6 HOURS
Refills: 0 | Status: DISCONTINUED | OUTPATIENT
Start: 2024-11-23 | End: 2024-11-27

## 2024-11-23 RX ORDER — OLANZAPINE 20 MG/1
2.5 TABLET ORAL ONCE
Refills: 0 | Status: COMPLETED | OUTPATIENT
Start: 2024-11-23 | End: 2024-11-23

## 2024-11-23 RX ADMIN — Medication 250 MILLIGRAM(S): at 06:46

## 2024-11-23 RX ADMIN — Medication 81 MILLIGRAM(S): at 11:00

## 2024-11-23 RX ADMIN — DORZOLAMIDE HYDROCHLORIDE 1 DROP(S): 20 SOLUTION OPHTHALMIC at 10:57

## 2024-11-23 RX ADMIN — METOPROLOL TARTRATE 25 MILLIGRAM(S): 100 TABLET, FILM COATED ORAL at 06:47

## 2024-11-23 RX ADMIN — Medication 5000 UNIT(S): at 13:17

## 2024-11-23 RX ADMIN — Medication 1 TABLET(S): at 17:28

## 2024-11-23 RX ADMIN — OLANZAPINE 2.5 MILLIGRAM(S): 20 TABLET ORAL at 22:35

## 2024-11-23 RX ADMIN — Medication 1 TABLET(S): at 06:46

## 2024-11-23 RX ADMIN — DORZOLAMIDE HYDROCHLORIDE 1 DROP(S): 20 SOLUTION OPHTHALMIC at 20:42

## 2024-11-23 RX ADMIN — AMLODIPINE BESYLATE 10 MILLIGRAM(S): 10 TABLET ORAL at 06:47

## 2024-11-23 RX ADMIN — Medication 5000 UNIT(S): at 21:09

## 2024-11-23 RX ADMIN — Medication 25 MILLIGRAM(S): at 21:09

## 2024-11-23 RX ADMIN — METOPROLOL TARTRATE 25 MILLIGRAM(S): 100 TABLET, FILM COATED ORAL at 17:27

## 2024-11-23 RX ADMIN — Medication 20 MILLIGRAM(S): at 21:09

## 2024-11-23 RX ADMIN — Medication 5000 UNIT(S): at 06:45

## 2024-11-23 RX ADMIN — Medication 250 MILLIGRAM(S): at 17:27

## 2024-11-23 RX ADMIN — OLANZAPINE 2.5 MILLIGRAM(S): 20 TABLET ORAL at 07:28

## 2024-11-23 NOTE — PROGRESS NOTE ADULT - ASSESSMENT
89 years old female with h/o HTN, HLD, focal seizure, dementia ( A&O x 1-2 ) was brought in to ED for aggressive behavior. Per daughter, patient has not been sleeping since Sunday night with worsening of aggressive behavior. Patient was hitting HHA at home and daughter has to call police.    # Dementia with aggressive behavior - Continue quetiapine 25mg hs, Zyprexa 2.5mg prn for agitation.  Neurology following.  Psych input.  D/w family role for non emergent CT head.  Pt's agitation appears to be consistent with previous episodes associated with UTI, dementia.  Would avoid sedating pt at present for imaging unless pt's symptoms / condition changes.  Family in agreement.   # UTI, ? metabolic encephalopathy - less likely.  Short course of po Bactrim.    # Essential HTN - Monitor BP.  Continue antihypertensives.  # Hyperlipidemia - diet modification.  Continue Statin.  # Seizure disorder.  ·  Plan: continue depakote.  # Inpatient DVT prophylaxis - subcutaneous Heparin      89 years old female with h/o HTN, HLD, focal seizure, dementia ( A&O x 1-2 ) was brought in to ED for aggressive behavior. Per daughter, patient has not been sleeping since Sunday night with worsening of aggressive behavior. Patient was hitting HHA at home and daughter has to call police.    # Dementia with aggressive behavior - Continue quetiapine 25mg hs, Zyprexa 2.5mg prn for agitation.  Neurology following.  Psych input.  D/w family role for non emergent CT head.  Pt's agitation appears to be consistent with previous episodes associated with UTI, dementia.  Would avoid sedating pt at present for imaging unless pt's symptoms / condition changes.  Family in agreement.   # UTI, ? metabolic encephalopathy - less likely.  Short course of po Bactrim.    # Essential HTN - Monitor BP.  Continue antihypertensives.  # Hyperlipidemia - diet modification.  Continue Statin.  # Seizure disorder.  ·  Plan: continue depakote.  # Inpatient DVT prophylaxis - subcutaneous Heparin     D/w Granddaughter Cheryle, Daughters Suri  and Kamilah   on 11/23.

## 2024-11-24 LAB
ANION GAP SERPL CALC-SCNC: 9 MMOL/L — SIGNIFICANT CHANGE UP (ref 5–17)
BUN SERPL-MCNC: 19 MG/DL — SIGNIFICANT CHANGE UP (ref 7–23)
CALCIUM SERPL-MCNC: 9.3 MG/DL — SIGNIFICANT CHANGE UP (ref 8.5–10.1)
CHLORIDE SERPL-SCNC: 110 MMOL/L — HIGH (ref 96–108)
CO2 SERPL-SCNC: 23 MMOL/L — SIGNIFICANT CHANGE UP (ref 22–31)
CREAT SERPL-MCNC: 1.33 MG/DL — HIGH (ref 0.5–1.3)
EGFR: 38 ML/MIN/1.73M2 — LOW
GLUCOSE SERPL-MCNC: 99 MG/DL — SIGNIFICANT CHANGE UP (ref 70–99)
HCT VFR BLD CALC: 35.2 % — SIGNIFICANT CHANGE UP (ref 34.5–45)
HGB BLD-MCNC: 11.8 G/DL — SIGNIFICANT CHANGE UP (ref 11.5–15.5)
MCHC RBC-ENTMCNC: 31.1 PG — SIGNIFICANT CHANGE UP (ref 27–34)
MCHC RBC-ENTMCNC: 33.5 G/DL — SIGNIFICANT CHANGE UP (ref 32–36)
MCV RBC AUTO: 92.9 FL — SIGNIFICANT CHANGE UP (ref 80–100)
NRBC # BLD: 0 /100 WBCS — SIGNIFICANT CHANGE UP (ref 0–0)
PLATELET # BLD AUTO: 152 K/UL — SIGNIFICANT CHANGE UP (ref 150–400)
POTASSIUM SERPL-MCNC: 3.3 MMOL/L — LOW (ref 3.5–5.3)
POTASSIUM SERPL-SCNC: 3.3 MMOL/L — LOW (ref 3.5–5.3)
RBC # BLD: 3.79 M/UL — LOW (ref 3.8–5.2)
RBC # FLD: 14.6 % — HIGH (ref 10.3–14.5)
SODIUM SERPL-SCNC: 142 MMOL/L — SIGNIFICANT CHANGE UP (ref 135–145)
WBC # BLD: 5.22 K/UL — SIGNIFICANT CHANGE UP (ref 3.8–10.5)
WBC # FLD AUTO: 5.22 K/UL — SIGNIFICANT CHANGE UP (ref 3.8–10.5)

## 2024-11-24 PROCEDURE — 99232 SBSQ HOSP IP/OBS MODERATE 35: CPT

## 2024-11-24 RX ORDER — POTASSIUM CHLORIDE 600 MG/1
20 TABLET, EXTENDED RELEASE ORAL ONCE
Refills: 0 | Status: DISCONTINUED | OUTPATIENT
Start: 2024-11-24 | End: 2024-11-24

## 2024-11-24 RX ORDER — POTASSIUM CHLORIDE 600 MG/1
20 TABLET, EXTENDED RELEASE ORAL ONCE
Refills: 0 | Status: COMPLETED | OUTPATIENT
Start: 2024-11-24 | End: 2024-11-24

## 2024-11-24 RX ADMIN — POTASSIUM CHLORIDE 20 MILLIEQUIVALENT(S): 600 TABLET, EXTENDED RELEASE ORAL at 16:25

## 2024-11-24 RX ADMIN — DORZOLAMIDE HYDROCHLORIDE 1 DROP(S): 20 SOLUTION OPHTHALMIC at 08:51

## 2024-11-24 RX ADMIN — Medication 1 TABLET(S): at 06:16

## 2024-11-24 RX ADMIN — AMLODIPINE BESYLATE 10 MILLIGRAM(S): 10 TABLET ORAL at 06:16

## 2024-11-24 RX ADMIN — Medication 1 TABLET(S): at 18:54

## 2024-11-24 RX ADMIN — Medication 81 MILLIGRAM(S): at 14:09

## 2024-11-24 RX ADMIN — Medication 5000 UNIT(S): at 06:29

## 2024-11-24 RX ADMIN — Medication 250 MILLIGRAM(S): at 06:16

## 2024-11-24 RX ADMIN — DORZOLAMIDE HYDROCHLORIDE 1 DROP(S): 20 SOLUTION OPHTHALMIC at 19:02

## 2024-11-24 RX ADMIN — Medication 5000 UNIT(S): at 14:09

## 2024-11-24 RX ADMIN — Medication 5000 UNIT(S): at 23:08

## 2024-11-24 RX ADMIN — METOPROLOL TARTRATE 25 MILLIGRAM(S): 100 TABLET, FILM COATED ORAL at 18:54

## 2024-11-24 RX ADMIN — Medication 20 MILLIGRAM(S): at 23:08

## 2024-11-24 RX ADMIN — Medication 250 MILLIGRAM(S): at 18:54

## 2024-11-24 RX ADMIN — METOPROLOL TARTRATE 25 MILLIGRAM(S): 100 TABLET, FILM COATED ORAL at 06:16

## 2024-11-25 PROCEDURE — 99232 SBSQ HOSP IP/OBS MODERATE 35: CPT

## 2024-11-25 PROCEDURE — 99231 SBSQ HOSP IP/OBS SF/LOW 25: CPT

## 2024-11-25 RX ORDER — POTASSIUM CHLORIDE 600 MG/1
40 TABLET, EXTENDED RELEASE ORAL ONCE
Refills: 0 | Status: COMPLETED | OUTPATIENT
Start: 2024-11-25 | End: 2024-11-25

## 2024-11-25 RX ADMIN — Medication 5000 UNIT(S): at 14:59

## 2024-11-25 RX ADMIN — DORZOLAMIDE HYDROCHLORIDE 1 DROP(S): 20 SOLUTION OPHTHALMIC at 21:03

## 2024-11-25 RX ADMIN — POTASSIUM CHLORIDE 40 MILLIEQUIVALENT(S): 600 TABLET, EXTENDED RELEASE ORAL at 10:51

## 2024-11-25 RX ADMIN — AMLODIPINE BESYLATE 10 MILLIGRAM(S): 10 TABLET ORAL at 06:04

## 2024-11-25 RX ADMIN — DORZOLAMIDE HYDROCHLORIDE 1 DROP(S): 20 SOLUTION OPHTHALMIC at 08:43

## 2024-11-25 RX ADMIN — Medication 81 MILLIGRAM(S): at 12:33

## 2024-11-25 RX ADMIN — Medication 5000 UNIT(S): at 21:08

## 2024-11-25 RX ADMIN — METOPROLOL TARTRATE 25 MILLIGRAM(S): 100 TABLET, FILM COATED ORAL at 06:04

## 2024-11-25 RX ADMIN — Medication 50 MILLIGRAM(S): at 21:08

## 2024-11-25 RX ADMIN — Medication 250 MILLIGRAM(S): at 17:36

## 2024-11-25 RX ADMIN — Medication 250 MILLIGRAM(S): at 06:03

## 2024-11-25 RX ADMIN — METOPROLOL TARTRATE 25 MILLIGRAM(S): 100 TABLET, FILM COATED ORAL at 17:36

## 2024-11-25 RX ADMIN — Medication 5000 UNIT(S): at 06:12

## 2024-11-25 RX ADMIN — Medication 20 MILLIGRAM(S): at 21:08

## 2024-11-25 RX ADMIN — Medication 1 TABLET(S): at 06:03

## 2024-11-25 NOTE — BH CONSULTATION LIAISON PROGRESS NOTE - OTHER
tenuous  limited given poor vision  deferred  "fine"  lower end of fair  appropriate  confabulation

## 2024-11-25 NOTE — BH CONSULTATION LIAISON PROGRESS NOTE - OTHER
limited given poor vision  deferred  appropriate  lower end of fair  "fine"  tenuous  confabulation

## 2024-11-25 NOTE — PROGRESS NOTE ADULT - ASSESSMENT
89 years old female with h/o HTN, HLD, focal seizure, dementia ( A&O x 1-2 ) was brought in to ED for aggressive behavior. Per daughter, patient has not been sleeping since Sunday night with worsening of aggressive behavior. Patient was hitting HHA at home and daughter has to call police.    # Dementia with aggressive behavior -Zyprexa 2.5mg prn for agitation.  Neurology following.  Psych input.  D/w family role for non emergent CT head.  Pt's agitation appears to be consistent with previous episodes associated with UTI, dementia.  Would avoid sedating pt at present for imaging unless pt's symptoms / condition changes.  Family in agreement.  Still with agitation overnight, some improvement with Seroquel.  Increased Seroquel to 50mg qhs  # UTI, Acute metabolic encephalopathy - s/p Short course of po Bactrim.    # Essential HTN - Monitor BP.  Continue antihypertensives.  # Hyperlipidemia - diet modification.  Continue Statin.  # Seizure disorder.  ·  Plan: continue depakote.  # Inpatient DVT prophylaxis - subcutaneous Heparin     D/w Granddaughter Cheryle, Daughters Suri  and Kamilah   on 11/23.

## 2024-11-26 ENCOUNTER — TRANSCRIPTION ENCOUNTER (OUTPATIENT)
Age: 89
End: 2024-11-26

## 2024-11-26 LAB
ANION GAP SERPL CALC-SCNC: 5 MMOL/L — SIGNIFICANT CHANGE UP (ref 5–17)
BUN SERPL-MCNC: 23 MG/DL — SIGNIFICANT CHANGE UP (ref 7–23)
CALCIUM SERPL-MCNC: 8.8 MG/DL — SIGNIFICANT CHANGE UP (ref 8.5–10.1)
CHLORIDE SERPL-SCNC: 113 MMOL/L — HIGH (ref 96–108)
CO2 SERPL-SCNC: 25 MMOL/L — SIGNIFICANT CHANGE UP (ref 22–31)
CREAT SERPL-MCNC: 1.19 MG/DL — SIGNIFICANT CHANGE UP (ref 0.5–1.3)
EGFR: 43 ML/MIN/1.73M2 — LOW
GLUCOSE SERPL-MCNC: 117 MG/DL — HIGH (ref 70–99)
HCT VFR BLD CALC: 34.8 % — SIGNIFICANT CHANGE UP (ref 34.5–45)
HGB BLD-MCNC: 11.5 G/DL — SIGNIFICANT CHANGE UP (ref 11.5–15.5)
MCHC RBC-ENTMCNC: 31.3 PG — SIGNIFICANT CHANGE UP (ref 27–34)
MCHC RBC-ENTMCNC: 33 G/DL — SIGNIFICANT CHANGE UP (ref 32–36)
MCV RBC AUTO: 94.6 FL — SIGNIFICANT CHANGE UP (ref 80–100)
NRBC # BLD: 0 /100 WBCS — SIGNIFICANT CHANGE UP (ref 0–0)
PLATELET # BLD AUTO: 154 K/UL — SIGNIFICANT CHANGE UP (ref 150–400)
POTASSIUM SERPL-MCNC: 4 MMOL/L — SIGNIFICANT CHANGE UP (ref 3.5–5.3)
POTASSIUM SERPL-SCNC: 4 MMOL/L — SIGNIFICANT CHANGE UP (ref 3.5–5.3)
RBC # BLD: 3.68 M/UL — LOW (ref 3.8–5.2)
RBC # FLD: 15.4 % — HIGH (ref 10.3–14.5)
SODIUM SERPL-SCNC: 143 MMOL/L — SIGNIFICANT CHANGE UP (ref 135–145)
WBC # BLD: 5.31 K/UL — SIGNIFICANT CHANGE UP (ref 3.8–10.5)
WBC # FLD AUTO: 5.31 K/UL — SIGNIFICANT CHANGE UP (ref 3.8–10.5)

## 2024-11-26 PROCEDURE — 70450 CT HEAD/BRAIN W/O DYE: CPT | Mod: 26

## 2024-11-26 PROCEDURE — 99232 SBSQ HOSP IP/OBS MODERATE 35: CPT

## 2024-11-26 PROCEDURE — 99231 SBSQ HOSP IP/OBS SF/LOW 25: CPT

## 2024-11-26 RX ADMIN — Medication 50 MILLIGRAM(S): at 21:14

## 2024-11-26 RX ADMIN — DORZOLAMIDE HYDROCHLORIDE 1 DROP(S): 20 SOLUTION OPHTHALMIC at 11:06

## 2024-11-26 RX ADMIN — OLANZAPINE 2.5 MILLIGRAM(S): 20 TABLET ORAL at 22:58

## 2024-11-26 RX ADMIN — Medication 250 MILLIGRAM(S): at 06:32

## 2024-11-26 RX ADMIN — DORZOLAMIDE HYDROCHLORIDE 1 DROP(S): 20 SOLUTION OPHTHALMIC at 19:55

## 2024-11-26 RX ADMIN — Medication 5000 UNIT(S): at 06:32

## 2024-11-26 RX ADMIN — AMLODIPINE BESYLATE 10 MILLIGRAM(S): 10 TABLET ORAL at 06:32

## 2024-11-26 RX ADMIN — Medication 81 MILLIGRAM(S): at 11:07

## 2024-11-26 RX ADMIN — METOPROLOL TARTRATE 25 MILLIGRAM(S): 100 TABLET, FILM COATED ORAL at 17:18

## 2024-11-26 RX ADMIN — Medication 5000 UNIT(S): at 21:14

## 2024-11-26 RX ADMIN — Medication 20 MILLIGRAM(S): at 21:14

## 2024-11-26 RX ADMIN — ACETAMINOPHEN, DIPHENHYDRAMINE HCL, PHENYLEPHRINE HCL 3 MILLIGRAM(S): 325; 25; 5 TABLET ORAL at 21:15

## 2024-11-26 RX ADMIN — Medication 250 MILLIGRAM(S): at 17:19

## 2024-11-26 RX ADMIN — Medication 5000 UNIT(S): at 15:00

## 2024-11-26 RX ADMIN — METOPROLOL TARTRATE 25 MILLIGRAM(S): 100 TABLET, FILM COATED ORAL at 06:32

## 2024-11-26 NOTE — DISCHARGE NOTE PROVIDER - HOSPITAL COURSE
89 years old female with h/o HTN, HLD, focal seizure, dementia ( A&O x 1-2 ) was brought in to ED for aggressive behavior. Per daughter, patient has not been sleeping since Sunday night with worsening of aggressive behavior. Patient was hitting HHA at home and daughter has to call police.    # Dementia with aggressive behavior -Zyprexa 2.5mg prn for agitation.  Neurology following.  Psych input.  Responding well to Seroquel at 50mg qhs, additional 12.5mg prn agitation.  CT head unremarkable, discussed with daughter.   # UTI, Acute metabolic encephalopathy - s/p Short course of po Bactrim.    # Essential HTN - Monitor BP.  Continue antihypertensives.  # Hyperlipidemia - diet modification.  Continue Statin.  # Seizure disorder.  ·  Plan: continue depakote.  # Inpatient DVT prophylaxis - subcutaneous Heparin     D/w Daughter Kamilah   on 11/26  Plan for d/c home 11/27 with homecare. 89 years old female with h/o HTN, HLD, focal seizure, dementia ( A&O x 1-2 ) was brought in to ED for aggressive behavior. Per daughter, patient has not been sleeping since Sunday night with worsening of aggressive behavior. Patient was hitting HHA at home and daughter has to call police.    # Dementia with aggressive behavior -Zyprexa 2.5mg prn for agitation.  Neurology following.  Psych input.  Responding well to Seroquel at 50mg qhs, additional 12.5mg prn agitation.  CT head unremarkable, discussed with daughter.   # UTI, Acute metabolic encephalopathy - s/p Short course of po Bactrim.    # Essential HTN - Monitor BP.  Continue antihypertensives.  # Hyperlipidemia - diet modification.  Continue Statin.  # Seizure disorder.  ·  Plan: continue depakote.  # Inpatient DVT prophylaxis - subcutaneous Heparin     D/w Daughter Kamilah   on 11/26  Plan for d/c home  with homecare.  Daughter agrees.     patient seen and evaluated. DC home.     DC time 37 mins

## 2024-11-26 NOTE — PROGRESS NOTE ADULT - REASON FOR ADMISSION
dementia with aggressive behavior

## 2024-11-26 NOTE — BH CONSULTATION LIAISON PROGRESS NOTE - OTHER
"fine"  confabulation  deferred  limited given poor vision  tenuous  lower end of fair  appropriate

## 2024-11-26 NOTE — DISCHARGE NOTE PROVIDER - NSDCMRMEDTOKEN_GEN_ALL_CORE_FT
amLODIPine 10 mg oral tablet: 1 tab(s) orally once a day  aspirin 81 mg oral tablet, chewable: 1 tab(s) orally once a day  atorvastatin 20 mg oral tablet: 1 tab(s) orally once a day (at bedtime)  Depakote Sprinkles 125 mg oral delayed release capsule: 2 cap(s) orally 2 times a day  dorzolamide 2% ophthalmic solution: 1 drop(s) in each eye 2 times a day  famotidine 20 mg oral tablet: 1 tab(s) orally once a day  metoprolol tartrate 25 mg oral tablet: 1 tab(s) orally 2 times a day  QUEtiapine 50 mg oral tablet: 1 tab(s) orally once a day (at bedtime)  Seroquel 25 mg oral tablet: 0.5 tab(s) orally every 6 hours as needed for severe agitation

## 2024-11-26 NOTE — PROGRESS NOTE ADULT - ASSESSMENT
89 years old female with h/o HTN, HLD, focal seizure, dementia ( A&O x 1-2 ) was brought in to ED for aggressive behavior. Per daughter, patient has not been sleeping since Sunday night with worsening of aggressive behavior. Patient was hitting HHA at home and daughter has to call police.    # Dementia with aggressive behavior -Zyprexa 2.5mg prn for agitation.  Neurology following.  Psych input.  Responding well to Seroquel at 50mg qhs, additional 12.5mg prn agitation.  CT head unremarkable, discussed with daughter.   # UTI, Acute metabolic encephalopathy - s/p Short course of po Bactrim.    # Essential HTN - Monitor BP.  Continue antihypertensives.  # Hyperlipidemia - diet modification.  Continue Statin.  # Seizure disorder.  ·  Plan: continue depakote.  # Inpatient DVT prophylaxis - subcutaneous Heparin     D/w Daughter Kamilah   on 11/26  Plan for d/c home 11/27 with homecare.

## 2024-11-26 NOTE — DISCHARGE NOTE PROVIDER - NSDCCPCAREPLAN_GEN_ALL_CORE_FT
PRINCIPAL DISCHARGE DIAGNOSIS  Diagnosis: Dementia with aggressive behavior  Assessment and Plan of Treatment:       SECONDARY DISCHARGE DIAGNOSES  Diagnosis: Hyperlipidemia, unspecified  Assessment and Plan of Treatment:     Diagnosis: Seizure disorder  Assessment and Plan of Treatment:     Diagnosis: Acute UTI  Assessment and Plan of Treatment:      PRINCIPAL DISCHARGE DIAGNOSIS  Diagnosis: Dementia with aggressive behavior  Assessment and Plan of Treatment: CT brain was negative for stroke      SECONDARY DISCHARGE DIAGNOSES  Diagnosis: Hyperlipidemia, unspecified  Assessment and Plan of Treatment: Continue atorvastatin    Diagnosis: Seizure disorder  Assessment and Plan of Treatment: Continue with depakote

## 2024-11-26 NOTE — DISCHARGE NOTE PROVIDER - NSDCFUADDAPPT_GEN_ALL_CORE_FT
APPTS ARE READY TO BE MADE: [X] YES    Best Family or Patient Contact (if needed):    Additional Information about above appointments (if needed):    1:   2:   3:     Other comments or requests:    APPTS ARE READY TO BE MADE: [X] YES    Best Family or Patient Contact (if needed):    Additional Information about above appointments (if needed):    1: pcp  2:   3:     Other comments or requests:    APPTS ARE READY TO BE MADE: [X] YES    Best Family or Patient Contact (if needed):    Additional Information about above appointments (if needed):    1: pcp  2:   3:     Other comments or requests:     Patient was outreached but did not answer nor could a voicemail be left.

## 2024-11-26 NOTE — PROGRESS NOTE ADULT - SUBJECTIVE AND OBJECTIVE BOX
Patient: ORTIZ LYNCH [id number] 89y Female                            Hospitalist Attending Note    Seen with 1:1 observer at bedside.  Still less agitated overnight, was able to sleep through the night.    Offers no complaints.     ____________________PHYSICAL EXAM:  GENERAL:  NAD, Arousable, Oriented to person Confused.    HEENT: NCAT  CARDIOVASCULAR:  S1, S2  LUNGS: CTAB  ABDOMEN:  soft, (-) tenderness, (-) distension, (+) bowel sounds, (-) guarding, (-) rebound (-) rigidity  EXTREMITIES:  no cyanosis / clubbing / edema.   NEURO: nonfocal  ____________________    VITALS:  Vital Signs Last 24 Hrs  T(C): 36.5 (26 Nov 2024 11:02), Max: 36.7 (25 Nov 2024 16:46)  T(F): 97.7 (26 Nov 2024 11:02), Max: 98.1 (25 Nov 2024 16:46)  HR: 94 (26 Nov 2024 11:02) (68 - 94)  BP: 136/78 (26 Nov 2024 11:02) (118/69 - 151/79)  BP(mean): --  RR: 18 (26 Nov 2024 11:02) (17 - 18)  SpO2: 99% (26 Nov 2024 11:02) (97% - 99%)    Parameters below as of 26 Nov 2024 05:06  Patient On (Oxygen Delivery Method): room air     Daily     Daily   CAPILLARY BLOOD GLUCOSE        I&O's Summary    25 Nov 2024 07:01  -  26 Nov 2024 07:00  --------------------------------------------------------  IN: 120 mL / OUT: 900 mL / NET: -780 mL        LABS:                        11.5   5.31  )-----------( 154      ( 26 Nov 2024 06:58 )             34.8     11-26    143  |  113[H]  |  23  ----------------------------<  117[H]  4.0   |  25  |  1.19    Ca    8.8      26 Nov 2024 06:58          Urinalysis Basic - ( 26 Nov 2024 06:58 )    Color: x / Appearance: x / SG: x / pH: x  Gluc: 117 mg/dL / Ketone: x  / Bili: x / Urobili: x   Blood: x / Protein: x / Nitrite: x   Leuk Esterase: x / RBC: x / WBC x   Sq Epi: x / Non Sq Epi: x / Bacteria: x              MEDICATIONS:  acetaminophen     Tablet .. 650 milliGRAM(s) Oral every 6 hours PRN  amLODIPine   Tablet 10 milliGRAM(s) Oral daily  aspirin  chewable 81 milliGRAM(s) Oral daily  atorvastatin 20 milliGRAM(s) Oral at bedtime  divalproex Sprinkle 250 milliGRAM(s) Oral two times a day  dorzolamide 2% Ophthalmic Solution 1 Drop(s) Both EYES <User Schedule>  heparin   Injectable 5000 Unit(s) SubCutaneous every 8 hours  influenza  Vaccine (HIGH DOSE) 0.5 milliLiter(s) IntraMuscular once  melatonin 3 milliGRAM(s) Oral at bedtime PRN  metoprolol tartrate 25 milliGRAM(s) Oral two times a day  OLANZapine Injectable 2.5 milliGRAM(s) IntraMuscular every 6 hours PRN  QUEtiapine 12.5 milliGRAM(s) Oral three times a day PRN  QUEtiapine 50 milliGRAM(s) Oral at bedtime    
                          Patient: ORTIZ LYNCH [id number] 89y Female                            Hospitalist Attending Note    Seen with 1:1 observer at bedside.  Per RN, pt less agitated overnight, was able to sleep through the night.    Offers no complaints.     ____________________PHYSICAL EXAM:  GENERAL:  NAD, Arousable, Oriented to person Confused.    HEENT: NCAT  CARDIOVASCULAR:  S1, S2  LUNGS: CTAB  ABDOMEN:  soft, (-) tenderness, (-) distension, (+) bowel sounds, (-) guarding, (-) rebound (-) rigidity  EXTREMITIES:  no cyanosis / clubbing / edema.   NEURO: nonfocal  ____________________      VITALS:  Vital Signs Last 24 Hrs  T(C): 37.2 (25 Nov 2024 10:46), Max: 37.2 (25 Nov 2024 10:46)  T(F): 98.9 (25 Nov 2024 10:46), Max: 98.9 (25 Nov 2024 10:46)  HR: 80 (25 Nov 2024 10:46) (78 - 87)  BP: 123/66 (25 Nov 2024 10:46) (110/77 - 136/83)  BP(mean): --  RR: 18 (25 Nov 2024 10:46) (17 - 18)  SpO2: 98% (25 Nov 2024 10:46) (96% - 99%)    Parameters below as of 25 Nov 2024 10:46  Patient On (Oxygen Delivery Method): room air     Daily     Daily   CAPILLARY BLOOD GLUCOSE        I&O's Summary    24 Nov 2024 07:01  -  25 Nov 2024 07:00  --------------------------------------------------------  IN: 1010 mL / OUT: 825 mL / NET: 185 mL        LABS:                        11.8   5.22  )-----------( 152      ( 24 Nov 2024 06:30 )             35.2     11-24    142  |  110[H]  |  19  ----------------------------<  99  3.3[L]   |  23  |  1.33[H]    Ca    9.3      24 Nov 2024 06:30          Urinalysis Basic - ( 24 Nov 2024 06:30 )    Color: x / Appearance: x / SG: x / pH: x  Gluc: 99 mg/dL / Ketone: x  / Bili: x / Urobili: x   Blood: x / Protein: x / Nitrite: x   Leuk Esterase: x / RBC: x / WBC x   Sq Epi: x / Non Sq Epi: x / Bacteria: x              MEDICATIONS:  acetaminophen     Tablet .. 650 milliGRAM(s) Oral every 6 hours PRN  amLODIPine   Tablet 10 milliGRAM(s) Oral daily  aspirin  chewable 81 milliGRAM(s) Oral daily  atorvastatin 20 milliGRAM(s) Oral at bedtime  divalproex Sprinkle 250 milliGRAM(s) Oral two times a day  dorzolamide 2% Ophthalmic Solution 1 Drop(s) Both EYES <User Schedule>  heparin   Injectable 5000 Unit(s) SubCutaneous every 8 hours  influenza  Vaccine (HIGH DOSE) 0.5 milliLiter(s) IntraMuscular once  melatonin 3 milliGRAM(s) Oral at bedtime PRN  metoprolol tartrate 25 milliGRAM(s) Oral two times a day  OLANZapine Injectable 2.5 milliGRAM(s) IntraMuscular every 6 hours PRN  QUEtiapine 50 milliGRAM(s) Oral at bedtime    
                          Patient: ORTIZ LYNCH [id number] 89y Female                            Hospitalist Attending Note    Seen with 1:1 observer at bedside.  Appears to be less agitated.      ____________________PHYSICAL EXAM:  GENERAL:  NAD, Alert and Oriented x 1 to person Confused.    HEENT: NCAT  CARDIOVASCULAR:  S1, S2  LUNGS: CTAB  ABDOMEN:  soft, (-) tenderness, (-) distension, (+) bowel sounds, (-) guarding, (-) rebound (-) rigidity  EXTREMITIES:  no cyanosis / clubbing / edema.   NEURO: nonfocal  ____________________     VITALS:  Vital Signs Last 24 Hrs  T(C): 36.7 (22 Nov 2024 17:06), Max: 36.7 (22 Nov 2024 17:06)  T(F): 98 (22 Nov 2024 17:06), Max: 98 (22 Nov 2024 17:06)  HR: 68 (22 Nov 2024 17:06) (64 - 80)  BP: 138/86 (22 Nov 2024 17:06) (138/86 - 158/85)  BP(mean): --  RR: 18 (22 Nov 2024 17:06) (16 - 18)  SpO2: 99% (22 Nov 2024 17:06) (95% - 99%)    Parameters below as of 22 Nov 2024 11:10  Patient On (Oxygen Delivery Method): room air     Daily     Daily   CAPILLARY BLOOD GLUCOSE        I&O's Summary    21 Nov 2024 07:01  -  22 Nov 2024 07:00  --------------------------------------------------------  IN: 200 mL / OUT: 600 mL / NET: -400 mL        HISTORY:  PAST MEDICAL & SURGICAL HISTORY:  Glaucoma      Legal blindness      HTN (hypertension)      Seizure      No significant past surgical history      Allergies    penicillin (Hives)    Intolerances    Tolerated CTX 11/2023 (Other)     LABS:                        10.9   4.42  )-----------( CLUMPED    ( 22 Nov 2024 06:09 )             32.9       Urinalysis with Rflx Culture (collected 21 Nov 2024 01:50)    Culture - Urine (collected 21 Nov 2024 01:50)  Source: Clean Catch  Preliminary Report (22 Nov 2024 16:03):    50,000 - 99,000 CFU/mL Gram Negative Rods    11-22    142  |  109[H]  |  13  ----------------------------<  79  3.6   |  27  |  0.86    Ca    8.9      22 Nov 2024 06:09  Phos  3.3     11-22  Mg     1.9     11-22    TPro  7.0  /  Alb  2.9[L]  /  TBili  0.4  /  DBili  x   /  AST  25  /  ALT  17  /  AlkPhos  62  11-22      LIVER FUNCTIONS - ( 22 Nov 2024 06:09 )  Alb: 2.9 g/dL / Pro: 7.0 gm/dL / ALK PHOS: 62 U/L / ALT: 17 U/L / AST: 25 U/L / GGT: x           Urinalysis Basic - ( 22 Nov 2024 06:09 )    Color: x / Appearance: x / SG: x / pH: x  Gluc: 79 mg/dL / Ketone: x  / Bili: x / Urobili: x   Blood: x / Protein: x / Nitrite: x   Leuk Esterase: x / RBC: x / WBC x   Sq Epi: x / Non Sq Epi: x / Bacteria: x          Urinalysis with Rflx Culture (collected 21 Nov 2024 01:50)    Culture - Urine (collected 21 Nov 2024 01:50)  Source: Clean Catch  Preliminary Report (22 Nov 2024 16:03):    50,000 - 99,000 CFU/mL Gram Negative Rods          MEDICATIONS:  MEDICATIONS  (STANDING):  amLODIPine   Tablet 10 milliGRAM(s) Oral daily  aspirin  chewable 81 milliGRAM(s) Oral daily  atorvastatin 20 milliGRAM(s) Oral at bedtime  divalproex Sprinkle 250 milliGRAM(s) Oral two times a day  dorzolamide 2% Ophthalmic Solution 1 Drop(s) Both EYES <User Schedule>  heparin   Injectable 5000 Unit(s) SubCutaneous every 8 hours  influenza  Vaccine (HIGH DOSE) 0.5 milliLiter(s) IntraMuscular once  metoprolol tartrate 25 milliGRAM(s) Oral two times a day  QUEtiapine 25 milliGRAM(s) Oral at bedtime  trimethoprim  160 mG/sulfamethoxazole 800 mG 1 Tablet(s) Oral two times a day    MEDICATIONS  (PRN):  acetaminophen     Tablet .. 650 milliGRAM(s) Oral every 6 hours PRN Mild Pain (1 - 3), Moderate Pain (4 - 6)  melatonin 3 milliGRAM(s) Oral at bedtime PRN Insomnia  OLANZapine 2.5 milliGRAM(s) Oral three times a day PRN agitation  
                          Patient: ORTIZ LYNCH [id number] 89y Female                            Hospitalist Attending Note    Seen with 1:1 observer at bedside.  Granddaughter Cheryle present.   Daughters Suri  and Kamilah  present via phone.  Pt agitated overnight, presently somnolent, arousable.     ____________________PHYSICAL EXAM:  GENERAL:  NAD, Arousable, Oriented to person Confused.    HEENT: NCAT  CARDIOVASCULAR:  S1, S2  LUNGS: CTAB  ABDOMEN:  soft, (-) tenderness, (-) distension, (+) bowel sounds, (-) guarding, (-) rebound (-) rigidity  EXTREMITIES:  no cyanosis / clubbing / edema.   NEURO: nonfocal  ____________________    VITALS:  Vital Signs Last 24 Hrs  T(C): 36.2 (23 Nov 2024 10:39), Max: 36.7 (22 Nov 2024 17:06)  T(F): 97.2 (23 Nov 2024 10:39), Max: 98 (22 Nov 2024 17:06)  HR: 95 (23 Nov 2024 10:39) (68 - 95)  BP: 167/89 (23 Nov 2024 10:39) (138/86 - 174/83)  BP(mean): --  RR: 18 (23 Nov 2024 10:39) (18 - 18)  SpO2: 98% (23 Nov 2024 10:39) (96% - 99%)    Parameters below as of 23 Nov 2024 05:50  Patient On (Oxygen Delivery Method): room air     Daily     Daily   CAPILLARY BLOOD GLUCOSE        I&O's Summary    22 Nov 2024 07:01  -  23 Nov 2024 07:00  --------------------------------------------------------  IN: 120 mL / OUT: 900 mL / NET: -780 mL    23 Nov 2024 07:01  -  23 Nov 2024 16:03  --------------------------------------------------------  IN: 360 mL / OUT: 450 mL / NET: -90 mL        LABS:                        10.9   4.42  )-----------( CLUMPED    ( 22 Nov 2024 06:09 )             32.9     11-22    142  |  109[H]  |  13  ----------------------------<  79  3.6   |  27  |  0.86    Ca    8.9      22 Nov 2024 06:09  Phos  3.3     11-22  Mg     1.9     11-22    TPro  7.0  /  Alb  2.9[L]  /  TBili  0.4  /  DBili  x   /  AST  25  /  ALT  17  /  AlkPhos  62  11-22      LIVER FUNCTIONS - ( 22 Nov 2024 06:09 )  Alb: 2.9 g/dL / Pro: 7.0 gm/dL / ALK PHOS: 62 U/L / ALT: 17 U/L / AST: 25 U/L / GGT: x           Urinalysis Basic - ( 22 Nov 2024 06:09 )    Color: x / Appearance: x / SG: x / pH: x  Gluc: 79 mg/dL / Ketone: x  / Bili: x / Urobili: x   Blood: x / Protein: x / Nitrite: x   Leuk Esterase: x / RBC: x / WBC x   Sq Epi: x / Non Sq Epi: x / Bacteria: x            Culture - Urine (collected 21 Nov 2024 01:50)  Source: Clean Catch  Final Report (22 Nov 2024 21:33):    >=3 organisms. Probable collection contamination.    Urinalysis with Rflx Culture (collected 21 Nov 2024 01:50)        MEDICATIONS:  acetaminophen     Tablet .. 650 milliGRAM(s) Oral every 6 hours PRN  amLODIPine   Tablet 10 milliGRAM(s) Oral daily  aspirin  chewable 81 milliGRAM(s) Oral daily  atorvastatin 20 milliGRAM(s) Oral at bedtime  divalproex Sprinkle 250 milliGRAM(s) Oral two times a day  dorzolamide 2% Ophthalmic Solution 1 Drop(s) Both EYES <User Schedule>  heparin   Injectable 5000 Unit(s) SubCutaneous every 8 hours  influenza  Vaccine (HIGH DOSE) 0.5 milliLiter(s) IntraMuscular once  melatonin 3 milliGRAM(s) Oral at bedtime PRN  metoprolol tartrate 25 milliGRAM(s) Oral two times a day  OLANZapine Injectable 2.5 milliGRAM(s) IntraMuscular every 6 hours PRN  QUEtiapine 25 milliGRAM(s) Oral at bedtime  trimethoprim  160 mG/sulfamethoxazole 800 mG 1 Tablet(s) Oral two times a day    
                          Patient: ORTIZ LYNCH [id number] 89y Female                            Hospitalist Attending Note    Seen with 1:1 observer at bedside.  Daughter Suri  present via phone.  Per RN, pt agitated overnight, arousable.     ____________________PHYSICAL EXAM:  GENERAL:  NAD, Arousable, Oriented to person Confused.    HEENT: NCAT  CARDIOVASCULAR:  S1, S2  LUNGS: CTAB  ABDOMEN:  soft, (-) tenderness, (-) distension, (+) bowel sounds, (-) guarding, (-) rebound (-) rigidity  EXTREMITIES:  no cyanosis / clubbing / edema.   NEURO: nonfocal  ____________________      VITALS:  Vital Signs Last 24 Hrs  T(C): 36.5 (24 Nov 2024 17:11), Max: 37.5 (24 Nov 2024 11:00)  T(F): 97.7 (24 Nov 2024 17:11), Max: 99.5 (24 Nov 2024 11:00)  HR: 78 (24 Nov 2024 17:11) (78 - 107)  BP: 136/83 (24 Nov 2024 17:11) (135/65 - 150/95)  BP(mean): --  RR: 18 (24 Nov 2024 17:11) (17 - 18)  SpO2: 96% (24 Nov 2024 17:11) (96% - 100%)    Parameters below as of 24 Nov 2024 17:11  Patient On (Oxygen Delivery Method): room air     Daily     Daily   CAPILLARY BLOOD GLUCOSE        I&O's Summary    23 Nov 2024 07:01  -  24 Nov 2024 07:00  --------------------------------------------------------  IN: 360 mL / OUT: 1250 mL / NET: -890 mL    24 Nov 2024 07:01  -  24 Nov 2024 18:21  --------------------------------------------------------  IN: 650 mL / OUT: 400 mL / NET: 250 mL        LABS:                        11.8   5.22  )-----------( 152      ( 24 Nov 2024 06:30 )             35.2     11-24    142  |  110[H]  |  19  ----------------------------<  99  3.3[L]   |  23  |  1.33[H]    Ca    9.3      24 Nov 2024 06:30          Urinalysis Basic - ( 24 Nov 2024 06:30 )    Color: x / Appearance: x / SG: x / pH: x  Gluc: 99 mg/dL / Ketone: x  / Bili: x / Urobili: x   Blood: x / Protein: x / Nitrite: x   Leuk Esterase: x / RBC: x / WBC x   Sq Epi: x / Non Sq Epi: x / Bacteria: x              MEDICATIONS:  acetaminophen     Tablet .. 650 milliGRAM(s) Oral every 6 hours PRN  amLODIPine   Tablet 10 milliGRAM(s) Oral daily  aspirin  chewable 81 milliGRAM(s) Oral daily  atorvastatin 20 milliGRAM(s) Oral at bedtime  divalproex Sprinkle 250 milliGRAM(s) Oral two times a day  dorzolamide 2% Ophthalmic Solution 1 Drop(s) Both EYES <User Schedule>  heparin   Injectable 5000 Unit(s) SubCutaneous every 8 hours  influenza  Vaccine (HIGH DOSE) 0.5 milliLiter(s) IntraMuscular once  melatonin 3 milliGRAM(s) Oral at bedtime PRN  metoprolol tartrate 25 milliGRAM(s) Oral two times a day  OLANZapine Injectable 2.5 milliGRAM(s) IntraMuscular every 6 hours PRN  QUEtiapine 50 milliGRAM(s) Oral at bedtime  trimethoprim  160 mG/sulfamethoxazole 800 mG 1 Tablet(s) Oral two times a day    
Neurology Progress Note    No acute events overnight.     Neuro Exam: AOx1. Follows commands. No dysarthria. No facial droop. Blind. Moving all four extremities.     A/P:   Dementia with behavioral disturbance  Seizure disorder  UTI  Blind    - continue Depakote 250mg BID for seizure disorder  - worsening behavioral symptoms likely due to infection  - on Seroquel 25mg nightly.   - Psychiatry also consulted and added Zyprexa 2.5mg TID.  - Neuro stable  - please call with questions.

## 2024-11-27 ENCOUNTER — TRANSCRIPTION ENCOUNTER (OUTPATIENT)
Age: 89
End: 2024-11-27

## 2024-11-27 VITALS
DIASTOLIC BLOOD PRESSURE: 57 MMHG | OXYGEN SATURATION: 96 % | SYSTOLIC BLOOD PRESSURE: 100 MMHG | RESPIRATION RATE: 18 BRPM | HEART RATE: 64 BPM | TEMPERATURE: 97 F

## 2024-11-27 PROCEDURE — 99239 HOSP IP/OBS DSCHRG MGMT >30: CPT

## 2024-11-27 PROCEDURE — 99231 SBSQ HOSP IP/OBS SF/LOW 25: CPT

## 2024-11-27 RX ADMIN — Medication 250 MILLIGRAM(S): at 17:53

## 2024-11-27 RX ADMIN — OLANZAPINE 2.5 MILLIGRAM(S): 20 TABLET ORAL at 10:43

## 2024-11-27 RX ADMIN — Medication 250 MILLIGRAM(S): at 06:00

## 2024-11-27 RX ADMIN — DORZOLAMIDE HYDROCHLORIDE 1 DROP(S): 20 SOLUTION OPHTHALMIC at 08:52

## 2024-11-27 RX ADMIN — Medication 81 MILLIGRAM(S): at 12:09

## 2024-11-27 RX ADMIN — Medication 5000 UNIT(S): at 14:20

## 2024-11-27 RX ADMIN — Medication 5000 UNIT(S): at 06:00

## 2024-11-27 RX ADMIN — Medication 12.5 MILLIGRAM(S): at 06:00

## 2024-11-27 NOTE — BH CONSULTATION LIAISON PROGRESS NOTE - CURRENT MEDICATION
MEDICATIONS  (STANDING):  amLODIPine   Tablet 10 milliGRAM(s) Oral daily  aspirin  chewable 81 milliGRAM(s) Oral daily  atorvastatin 20 milliGRAM(s) Oral at bedtime  divalproex Sprinkle 250 milliGRAM(s) Oral two times a day  dorzolamide 2% Ophthalmic Solution 1 Drop(s) Both EYES <User Schedule>  heparin   Injectable 5000 Unit(s) SubCutaneous every 8 hours  influenza  Vaccine (HIGH DOSE) 0.5 milliLiter(s) IntraMuscular once  metoprolol tartrate 25 milliGRAM(s) Oral two times a day  QUEtiapine 50 milliGRAM(s) Oral at bedtime    MEDICATIONS  (PRN):  acetaminophen     Tablet .. 650 milliGRAM(s) Oral every 6 hours PRN Mild Pain (1 - 3), Moderate Pain (4 - 6)  melatonin 3 milliGRAM(s) Oral at bedtime PRN Insomnia  OLANZapine Injectable 2.5 milliGRAM(s) IntraMuscular every 6 hours PRN Severe agitation  QUEtiapine 12.5 milliGRAM(s) Oral three times a day PRN moderate agitation  

## 2024-11-27 NOTE — DISCHARGE NOTE NURSING/CASE MANAGEMENT/SOCIAL WORK - PATIENT PORTAL LINK FT
You can access the FollowMyHealth Patient Portal offered by  by registering at the following website: [email or web address]. By joining [facility]’s FollowMyHealth portal, you will also be able to view your health information using other applications (apps) compatible with our system.

## 2024-11-27 NOTE — BH CONSULTATION LIAISON PROGRESS NOTE - NSBHCONSULTFOLLOWAFTERCARE_PSY_A_CORE FT
as per primary team 
as per primary team 
as per primary team; outpt dementia medication management can be done by Internist/PCP, Neurology or geriatrician 
as per primary team

## 2024-11-27 NOTE — DISCHARGE NOTE NURSING/CASE MANAGEMENT/SOCIAL WORK - NSDCFUADDAPPT_GEN_ALL_CORE_FT
APPTS ARE READY TO BE MADE: [X] YES    Best Family or Patient Contact (if needed):    Additional Information about above appointments (if needed):    1: pcp  2:   3:     Other comments or requests:

## 2024-11-27 NOTE — BH CONSULTATION LIAISON PROGRESS NOTE - NSBHPSYCHOLCOGORIENT_PSY_A_CORE
----- Message from Malina Rodriguez sent at 5/16/2024  8:17 AM CDT -----  Who called: self        What is the request in detail: pt would like call back from nurse to med change Voltaren to 3% and refill on her oxyCODONE-acetaminophen (PERCOCET)  mg per tablet       Can the clinic reply by [facility]? No        Would the patient rather a call back or a response via [facility]? Call back       Best call back number:[phone]  
Patient said that her pharmacist told her that the diclofenac sodium (VOLTAREN) 1 % Gel is sold across the counter so the insurance won't cover it but if the provider will change the strength to 3 %, the insurance will pay for the Rx.  
Please let her know that diclofenac 3% is typically used to treat skin conditions so I am concerned it may be too strong for her to use it for arthritis pain. I am not sure if it will cause skin discoloration.    Simona Torres MD    
Not fully oriented...

## 2024-11-27 NOTE — BH CONSULTATION LIAISON PROGRESS NOTE - NSBHCHARTREVIEWINVESTIGATE_PSY_A_CORE FT
CT Head No Cont (05.28.24 @ 09:41) Mild chronic microvascular changes without evidence of an acute transcortical infarction or hemorrhage.        
CT Head No Cont done today showing periventricular and subcortical white matter hypodensity representing mild chronic microvascular ischemic changes.        
CT Head No Cont done today showing periventricular and subcortical white matter hypodensity representing mild chronic microvascular ischemic changes.        
CT Head No Cont (05.28.24 @ 09:41) Mild chronic microvascular changes without evidence of an acute transcortical infarction or hemorrhage.

## 2024-11-27 NOTE — BH CONSULTATION LIAISON PROGRESS NOTE - OTHER
appropriate  lower end of fair  tenuous  limited given poor vision  "fine"  deferred  confabulation

## 2024-11-27 NOTE — BH CONSULTATION LIAISON PROGRESS NOTE - NSBHMSELANG_PSY_A_CORE
Impaired naming/Impaired repetition

## 2024-11-27 NOTE — BH CONSULTATION LIAISON PROGRESS NOTE - NSBHCONSULTRECOMMENDOTHER_PSY_A_CORE FT
11/21/24: continue depakote sprinkles 250mg PO bid with theraputic range serum level of 84; at lowest effective dose. Targets both seizure and mood lability/impulsivity   - "aggression towards caretaker" ....Pt is 89 years old, legally blind and 64 kg...not likely to be able to cause serious physical harm/injury to others.   - poor vision leads to more likelihood of confusion, disorientation, and agitation given that this is a significant sensory deficit which often results in a patient's being scared, unsure of their surroundings. There are specific caretaking recommendations when dealing with dementia patients with poor vision (available via Internet search)  11/25/24: Seroquel at 50mg PO qhs; can add on 12.5mg PO PRNs for daytime agitation  - continue depakote sprinkles 250mg PO bid as per Neurology; seems to be at lowest effective dose with theraputic range plasma concentration   - GOC per family: remains full Code   - reorient throughout the day, before engaging in physical exam / moving patient - verbally describe what one is about to do given Pt's limited vision, hydration, nutrition (needs meal assists), out of bed to chair, enrich environment 
- continue depakote sprinkles 250mg PO bid with theraputic range serum level of 84; at lowest effective dose. Targets both seizure and mood lability/impulsivity   - "aggression towards caretaker" ....Pt is 89 years old, legally blind and 64 kg...not likely to be able to cause serious physical harm/injury to others.   - poor vision leads to more likelihood of confusion, disorientation, and agitation given that this is a significant sensory deficit which often results in a patient's being scared, unsure of their surroundings. There are specific caretaking recommendations when dealing with dementia patients with poor vision (available via Internet search)  11/25/24: Seroquel at 50mg PO qhs; can add on 12.5mg PO PRNs for daytime agitation  - continue depakote sprinkles 250mg PO bid as per Neurology; seems to be at lowest effective dose with theraputic range plasma concentration   - GOC per family: remains full Code 
11/21/24: continue depakote sprinkles 250mg PO bid with theraputic range serum level of 84; at lowest effective dose. Targets both seizure and mood lability/impulsivity   - "aggression towards caretaker" ....Pt is 89 years old, legally blind and 64 kg...not likely to be able to cause serious physical harm/injury to others.   - poor vision leads to more likelihood of confusion, disorientation, and agitation given that this is a significant sensory deficit which often results in a patient's being scared, unsure of their surroundings. There are specific caretaking recommendations when dealing with dementia patients with poor vision (available via Internet search)  11/25/24: Seroquel at 50mg PO qhs; can add on 12.5mg PO PRNs for daytime agitation  - continue depakote sprinkles 250mg PO bid as per Neurology; seems to be at lowest effective dose with theraputic range plasma concentration   - GOC per family: remains full Code   - reorient throughout the day, before engaging in physical exam / moving patient - verbally describe what one is about to do given Pt's limited vision, hydration, nutrition (needs meal assists), out of bed to chair, enrich environment   11/26/24: CT head no contrast done - no acute pathology  - no new recommendations  - less is more in this case given Pt's advanced age; simplify regimen / minimize pill burden is possible and keep on only essential agents     
11/21/24: continue depakote sprinkles 250mg PO bid with theraputic range serum level of 84; at lowest effective dose. Targets both seizure and mood lability/impulsivity   - "aggression towards caretaker" ....Pt is 89 years old, legally blind and 64 kg...not likely to be able to cause serious physical harm/injury to others.   - poor vision leads to more likelihood of confusion, disorientation, and agitation given that this is a significant sensory deficit which often results in a patient's being scared, unsure of their surroundings. There are specific caretaking recommendations when dealing with dementia patients with poor vision (available via Internet search)  11/25/24: Seroquel at 50mg PO qhs; can add on 12.5mg PO PRNs for daytime agitation  - continue depakote sprinkles 250mg PO bid as per Neurology; seems to be at lowest effective dose with theraputic range plasma concentration   - GOC per family: remains full Code   - reorient throughout the day, before engaging in physical exam / moving patient - verbally describe what one is about to do given Pt's limited vision, hydration, nutrition (needs meal assists), out of bed to chair, enrich environment   11/26/24: CT head no contrast done - no acute pathology  - no new recommendations  - less is more in this case given Pt's advanced age; simplify regimen / minimize pill burden is possible and keep on only essential agents   11/27/24: Patient DC home with continue 24/7 HHA services  - can go home with Seroquel at 50mg PO qhs; can add on 12.5mg PO PRNs up tp TID  for daytime agitation  - [facility] signing off

## 2024-11-27 NOTE — BH CONSULTATION LIAISON PROGRESS NOTE - NSBHFUPREASONCONS_PSY_A_CORE
dementia/agitation/med management

## 2024-11-27 NOTE — DISCHARGE NOTE NURSING/CASE MANAGEMENT/SOCIAL WORK - NSDCPEFALRISK_GEN_ALL_CORE
For information on Fall & Injury Prevention, visit: [email or web address] OR  [email or web address] OR  https://www.cdc.gov/steadi/patient.html

## 2024-11-27 NOTE — BH CONSULTATION LIAISON PROGRESS NOTE - NSBHCHARTREVIEWVS_PSY_A_CORE FT
Vital Signs Last 24 Hrs  T(C): 37.2 (25 Nov 2024 10:46), Max: 37.2 (25 Nov 2024 10:46)  T(F): 98.9 (25 Nov 2024 10:46), Max: 98.9 (25 Nov 2024 10:46)  HR: 80 (25 Nov 2024 10:46) (78 - 87)  BP: 123/66 (25 Nov 2024 10:46) (110/77 - 136/83)  BP(mean): --  RR: 18 (25 Nov 2024 10:46) (17 - 18)  SpO2: 98% (25 Nov 2024 10:46) (96% - 99%)    Parameters below as of 25 Nov 2024 10:46  Patient On (Oxygen Delivery Method): room air    
Vital Signs Last 24 Hrs  T(C): 36.5 (26 Nov 2024 11:02), Max: 36.7 (25 Nov 2024 16:46)  T(F): 97.7 (26 Nov 2024 11:02), Max: 98.1 (25 Nov 2024 16:46)  HR: 94 (26 Nov 2024 11:02) (68 - 94)  BP: 136/78 (26 Nov 2024 11:02) (118/69 - 151/79)  BP(mean): --  RR: 18 (26 Nov 2024 11:02) (17 - 18)  SpO2: 99% (26 Nov 2024 11:02) (97% - 99%)    Parameters below as of 26 Nov 2024 05:06  Patient On (Oxygen Delivery Method): room air    
Vital Signs Last 24 Hrs  T(C): 36.2 (27 Nov 2024 11:00), Max: 37.1 (26 Nov 2024 17:30)  T(F): 97.2 (27 Nov 2024 11:00), Max: 98.7 (26 Nov 2024 17:30)  HR: 86 (27 Nov 2024 11:00) (82 - 91)  BP: 124/69 (27 Nov 2024 11:00) (102/68 - 130/77)  BP(mean): --  RR: 18 (27 Nov 2024 11:00) (18 - 18)  SpO2: 99% (27 Nov 2024 11:00) (96% - 99%)    Parameters below as of 27 Nov 2024 11:00  Patient On (Oxygen Delivery Method): room air    
Vital Signs Last 24 Hrs  T(C): 36.7 (25 Nov 2024 16:46), Max: 37.2 (25 Nov 2024 10:46)  T(F): 98.1 (25 Nov 2024 16:46), Max: 98.9 (25 Nov 2024 10:46)  HR: 75 (25 Nov 2024 16:46) (75 - 87)  BP: 118/69 (25 Nov 2024 16:46) (110/77 - 130/80)  BP(mean): --  RR: 17 (25 Nov 2024 16:46) (17 - 18)  SpO2: 98% (25 Nov 2024 16:46) (96% - 99%)    Parameters below as of 25 Nov 2024 16:46  Patient On (Oxygen Delivery Method): room air

## 2024-11-27 NOTE — BH CONSULTATION LIAISON PROGRESS NOTE - NSBHINDICATION_PSY_ALL_CORE
fall risk; can convert to enhanced supervision once moved across nurses station to a room 
fall risk 
fall risk; can convert to enhanced supervision once moved across nurses station to a room 
fall risk; can convert to enhanced supervision once moved across nurses station to a room

## 2024-11-27 NOTE — DISCHARGE NOTE NURSING/CASE MANAGEMENT/SOCIAL WORK - NSDCPNINST_GEN_ALL_CORE
Pts lizbethrKamilah verbalized understanding of discharge instructions, medications and scheduling follow up appointments with MD.

## 2024-11-27 NOTE — BH CONSULTATION LIAISON PROGRESS NOTE - NSBHFUPINTERVALHXFT_PSY_A_CORE
CT Head No Cont done today showing periventricular and subcortical white matter hypodensity representing mild chronic microvascular ischemic changes. Patient did not need any PRNs for agitation since last seen. As per staff, Patient had another good day today thus far and has been calm, cooperative. Patient maintaining the same psychiatric clinical presentation which is consistent with her baseline. Patient otherwise has no complaints, remains pleasantly confused including confabulating when asked questions. Denies pain or any other sort of discomfort. 
Appreciate Neurology consultation. Appreciate GOC by Palliative Care team 
Patient medically cleared for discharge home with continuation of her 24/7 A services. No significant interval events. Patient maintaining the same psychiatric clinical presentation which is consistent with her baseline. Patient otherwise has no complaints, remains pleasantly confused including confabulating when asked questions. Denies pain or any other sort of discomfort. 
Appreciate Neurology consultation. Appreciate GOC by Palliative Care team. No significant interval events; as per staff, Patient had a good day today thus far and has been calm, cooperative, yesterday she was more restless and tried to get out of bed. Seen at bedside with Pt's long time friend Paris giving her some water to drink . Patient is calm, cooperative, allowed Writer to examine her (she complained about inside of her lip hurting; on exam mild irritation consistent with rubbing against dentures. Staff applying vaseline to area). Patient otherwise has no complaints, remains pleasantly confused including confabulating when asked questions. Denies pain or any other sort of discomfort.

## 2024-11-27 NOTE — BH CONSULTATION LIAISON PROGRESS NOTE - NSICDXBHPRIMARYDX_PSY_ALL_CORE
Major neurocognitive disorder   F03.90  

## 2024-11-27 NOTE — BH CONSULTATION LIAISON PROGRESS NOTE - NSBHCHARTREVIEWLAB_PSY_A_CORE FT
11-26    143  |  113[H]  |  23  ----------------------------<  117[H]  4.0   |  25  |  1.19    Ca    8.8      26 Nov 2024 06:58    
11-22    142  |  109[H]  |  13  ----------------------------<  79  3.6   |  27  |  0.86    Ca    8.9      22 Nov 2024 06:09  Phos  3.3     11-22  Mg     1.9     11-22    TPro  7.0  /  Alb  2.9[L]  /  TBili  0.4  /  DBili  x   /  AST  25  /  ALT  17  /  AlkPhos  62  11-22  
11-26    143  |  113[H]  |  23  ----------------------------<  117[H]  4.0   |  25  |  1.19    Ca    8.8      26 Nov 2024 06:58    
11-24    142  |  110[H]  |  19  ----------------------------<  99  3.3[L]   |  23  |  1.33[H]    Ca    9.3      24 Nov 2024 06:30

## 2024-11-27 NOTE — DISCHARGE NOTE NURSING/CASE MANAGEMENT/SOCIAL WORK - FINANCIAL ASSISTANCE
[facility] provides services at a reduced cost to those who are determined to be eligible through [facility]’s financial assistance program. Information regarding [facility]’s financial assistance program can be found by going to [email or web address] or by calling [phone].

## 2024-11-27 NOTE — BH CONSULTATION LIAISON PROGRESS NOTE - NSBHATTESTBILLING_PSY_A_CORE
96152-Fzfsvaecnv OBS or IP - low complexity OR 25-34 mins
03202-Nuqsxfuhfv OBS or IP - low complexity OR 25-34 mins
57925-Akmflgaqoo OBS or IP - low complexity OR 25-34 mins
67768-Zmorazpkrk OBS or IP - low complexity OR 25-34 mins

## 2024-12-05 DIAGNOSIS — Z88.2 ALLERGY STATUS TO SULFONAMIDES: ICD-10-CM

## 2024-12-05 DIAGNOSIS — G47.00 INSOMNIA, UNSPECIFIED: ICD-10-CM

## 2024-12-05 DIAGNOSIS — H35.30 UNSPECIFIED MACULAR DEGENERATION: ICD-10-CM

## 2024-12-05 DIAGNOSIS — I25.2 OLD MYOCARDIAL INFARCTION: ICD-10-CM

## 2024-12-05 DIAGNOSIS — H54.8 LEGAL BLINDNESS, AS DEFINED IN USA: ICD-10-CM

## 2024-12-05 DIAGNOSIS — N39.0 URINARY TRACT INFECTION, SITE NOT SPECIFIED: ICD-10-CM

## 2024-12-05 DIAGNOSIS — R40.0 SOMNOLENCE: ICD-10-CM

## 2024-12-05 DIAGNOSIS — I10 ESSENTIAL (PRIMARY) HYPERTENSION: ICD-10-CM

## 2024-12-05 DIAGNOSIS — E78.5 HYPERLIPIDEMIA, UNSPECIFIED: ICD-10-CM

## 2024-12-05 DIAGNOSIS — G40.109 LOCALIZATION-RELATED (FOCAL) (PARTIAL) SYMPTOMATIC EPILEPSY AND EPILEPTIC SYNDROMES WITH SIMPLE PARTIAL SEIZURES, NOT INTRACTABLE, WITHOUT STATUS EPILEPTICUS: ICD-10-CM

## 2024-12-05 DIAGNOSIS — F03.911 UNSPECIFIED DEMENTIA, UNSPECIFIED SEVERITY, WITH AGITATION: ICD-10-CM

## 2024-12-05 DIAGNOSIS — G93.41 METABOLIC ENCEPHALOPATHY: ICD-10-CM

## 2024-12-05 DIAGNOSIS — Z88.0 ALLERGY STATUS TO PENICILLIN: ICD-10-CM

## 2024-12-05 DIAGNOSIS — D32.0 BENIGN NEOPLASM OF CEREBRAL MENINGES: ICD-10-CM

## 2024-12-05 DIAGNOSIS — Z79.899 OTHER LONG TERM (CURRENT) DRUG THERAPY: ICD-10-CM

## 2024-12-05 DIAGNOSIS — H40.9 UNSPECIFIED GLAUCOMA: ICD-10-CM

## 2025-01-26 NOTE — ED ADULT NURSE NOTE - CAS TRG GEN SKIN COLOR
Positive 2/2 blood cultures G+cocci . Was seen in ed, had blood cx drawn and I&D at bedside by gen surgery and discharged on augmentin.   Appropriate abx coverage for the blood cultures.   Attempted to call patient to discuss sxs without answerx2.   Pt to be instructed to return if worsening fever, chills, pain or other concerns.   No message left   
Normal for race

## 2025-03-25 NOTE — ED ADULT NURSE NOTE - TEMPLATE LIST FOR HEAD TO TOE ASSESSMENT
Pt called stating she has been waiting for call back since yesterday regarding lidocaine HCI order- [facility] is not letting pt  medication due to provider not putting clear instructions on frequency on medication, only states PRN. Pt states she is very frustrated due to pain and not able to take medication.    General

## 2025-04-14 ENCOUNTER — EMERGENCY (EMERGENCY)
Facility: HOSPITAL | Age: 89
LOS: 0 days | Discharge: HOME | End: 2025-04-14
Attending: EMERGENCY MEDICINE
Payer: MEDICARE

## 2025-04-14 VITALS
TEMPERATURE: 101 F | WEIGHT: 139.99 LBS | OXYGEN SATURATION: 98 % | RESPIRATION RATE: 20 BRPM | SYSTOLIC BLOOD PRESSURE: 168 MMHG | DIASTOLIC BLOOD PRESSURE: 88 MMHG | HEIGHT: 67 IN | HEART RATE: 88 BPM

## 2025-04-14 DIAGNOSIS — R19.7 DIARRHEA, UNSPECIFIED: ICD-10-CM

## 2025-04-14 DIAGNOSIS — R11.2 NAUSEA WITH VOMITING, UNSPECIFIED: ICD-10-CM

## 2025-04-14 DIAGNOSIS — Z88.0 ALLERGY STATUS TO PENICILLIN: ICD-10-CM

## 2025-04-14 DIAGNOSIS — B34.9 VIRAL INFECTION, UNSPECIFIED: ICD-10-CM

## 2025-04-14 LAB
ALBUMIN SERPL ELPH-MCNC: 2.9 G/DL — LOW (ref 3.3–5)
ALP SERPL-CCNC: 80 U/L — SIGNIFICANT CHANGE UP (ref 40–120)
ALT FLD-CCNC: 24 U/L — SIGNIFICANT CHANGE UP (ref 12–78)
ANION GAP SERPL CALC-SCNC: 7 MMOL/L — SIGNIFICANT CHANGE UP (ref 5–17)
APPEARANCE UR: ABNORMAL
APTT BLD: 24.4 SEC — LOW (ref 24.5–35.6)
AST SERPL-CCNC: 28 U/L — SIGNIFICANT CHANGE UP (ref 15–37)
BASOPHILS # BLD AUTO: 0 K/UL — SIGNIFICANT CHANGE UP (ref 0–0.2)
BASOPHILS NFR BLD AUTO: 0 % — SIGNIFICANT CHANGE UP (ref 0–2)
BILIRUB SERPL-MCNC: 0.4 MG/DL — SIGNIFICANT CHANGE UP (ref 0.2–1.2)
BILIRUB UR-MCNC: NEGATIVE — SIGNIFICANT CHANGE UP
BUN SERPL-MCNC: 16 MG/DL — SIGNIFICANT CHANGE UP (ref 7–23)
CALCIUM SERPL-MCNC: 8.9 MG/DL — SIGNIFICANT CHANGE UP (ref 8.5–10.1)
CHLORIDE SERPL-SCNC: 107 MMOL/L — SIGNIFICANT CHANGE UP (ref 96–108)
CO2 SERPL-SCNC: 26 MMOL/L — SIGNIFICANT CHANGE UP (ref 22–31)
COLOR SPEC: YELLOW — SIGNIFICANT CHANGE UP
CREAT SERPL-MCNC: 0.98 MG/DL — SIGNIFICANT CHANGE UP (ref 0.5–1.3)
DIFF PNL FLD: ABNORMAL
EGFR: 54 ML/MIN/1.73M2 — LOW
EGFR: 54 ML/MIN/1.73M2 — LOW
EOSINOPHIL # BLD AUTO: 0.06 K/UL — SIGNIFICANT CHANGE UP (ref 0–0.5)
EOSINOPHIL NFR BLD AUTO: 1 % — SIGNIFICANT CHANGE UP (ref 0–6)
FLUAV AG NPH QL: SIGNIFICANT CHANGE UP
FLUBV AG NPH QL: SIGNIFICANT CHANGE UP
GLUCOSE SERPL-MCNC: 152 MG/DL — HIGH (ref 70–99)
GLUCOSE UR QL: NEGATIVE MG/DL — SIGNIFICANT CHANGE UP
HCT VFR BLD CALC: 35 % — SIGNIFICANT CHANGE UP (ref 34.5–45)
HGB BLD-MCNC: 11.6 G/DL — SIGNIFICANT CHANGE UP (ref 11.5–15.5)
INR BLD: 1.03 RATIO — SIGNIFICANT CHANGE UP (ref 0.85–1.16)
KETONES UR-MCNC: NEGATIVE MG/DL — SIGNIFICANT CHANGE UP
LACTATE SERPL-SCNC: 2.8 MMOL/L — HIGH (ref 0.7–2)
LACTATE SERPL-SCNC: 3.7 MMOL/L — HIGH (ref 0.7–2)
LEUKOCYTE ESTERASE UR-ACNC: ABNORMAL
LYMPHOCYTES # BLD AUTO: 0.23 K/UL — LOW (ref 1–3.3)
LYMPHOCYTES # BLD AUTO: 4 % — LOW (ref 13–44)
MAGNESIUM SERPL-MCNC: 1.7 MG/DL — SIGNIFICANT CHANGE UP (ref 1.6–2.6)
MCHC RBC-ENTMCNC: 31.7 PG — SIGNIFICANT CHANGE UP (ref 27–34)
MCHC RBC-ENTMCNC: 33.1 G/DL — SIGNIFICANT CHANGE UP (ref 32–36)
MCV RBC AUTO: 95.6 FL — SIGNIFICANT CHANGE UP (ref 80–100)
MONOCYTES # BLD AUTO: 0.47 K/UL — SIGNIFICANT CHANGE UP (ref 0–0.9)
MONOCYTES NFR BLD AUTO: 8 % — SIGNIFICANT CHANGE UP (ref 2–14)
NEUTROPHILS # BLD AUTO: 4.92 K/UL — SIGNIFICANT CHANGE UP (ref 1.8–7.4)
NEUTROPHILS NFR BLD AUTO: 84 % — HIGH (ref 43–77)
NITRITE UR-MCNC: NEGATIVE — SIGNIFICANT CHANGE UP
NRBC BLD AUTO-RTO: SIGNIFICANT CHANGE UP /100 WBCS (ref 0–0)
NT-PROBNP SERPL-SCNC: 165 PG/ML — SIGNIFICANT CHANGE UP (ref 0–450)
PH UR: 7.5 — SIGNIFICANT CHANGE UP (ref 5–8)
PHOSPHATE SERPL-MCNC: 3.2 MG/DL — SIGNIFICANT CHANGE UP (ref 2.5–4.5)
PLATELET # BLD AUTO: 140 K/UL — LOW (ref 150–400)
POTASSIUM SERPL-MCNC: 3.8 MMOL/L — SIGNIFICANT CHANGE UP (ref 3.5–5.3)
POTASSIUM SERPL-SCNC: 3.8 MMOL/L — SIGNIFICANT CHANGE UP (ref 3.5–5.3)
PROT SERPL-MCNC: 7.7 GM/DL — SIGNIFICANT CHANGE UP (ref 6–8.3)
PROT UR-MCNC: NEGATIVE MG/DL — SIGNIFICANT CHANGE UP
PROTHROM AB SERPL-ACNC: 11.6 SEC — SIGNIFICANT CHANGE UP (ref 9.9–13.4)
RBC # BLD: 3.66 M/UL — LOW (ref 3.8–5.2)
RBC # FLD: 14.3 % — SIGNIFICANT CHANGE UP (ref 10.3–14.5)
RSV RNA NPH QL NAA+NON-PROBE: SIGNIFICANT CHANGE UP
SARS-COV-2 RNA SPEC QL NAA+PROBE: SIGNIFICANT CHANGE UP
SODIUM SERPL-SCNC: 140 MMOL/L — SIGNIFICANT CHANGE UP (ref 135–145)
SOURCE RESPIRATORY: SIGNIFICANT CHANGE UP
SP GR SPEC: 1.02 — SIGNIFICANT CHANGE UP (ref 1–1.03)
TROPONIN I, HIGH SENSITIVITY RESULT: 6.9 NG/L — SIGNIFICANT CHANGE UP
UROBILINOGEN FLD QL: 1 MG/DL — SIGNIFICANT CHANGE UP (ref 0.2–1)
VALPROATE SERPL-MCNC: <3 UG/ML — LOW (ref 50–100)
WBC # BLD: 5.86 K/UL — SIGNIFICANT CHANGE UP (ref 3.8–10.5)
WBC # FLD AUTO: 5.86 K/UL — SIGNIFICANT CHANGE UP (ref 3.8–10.5)

## 2025-04-14 PROCEDURE — 74177 CT ABD & PELVIS W/CONTRAST: CPT | Mod: 26

## 2025-04-14 PROCEDURE — 93010 ELECTROCARDIOGRAM REPORT: CPT

## 2025-04-14 PROCEDURE — 71045 X-RAY EXAM CHEST 1 VIEW: CPT | Mod: 26

## 2025-04-14 PROCEDURE — 99285 EMERGENCY DEPT VISIT HI MDM: CPT

## 2025-04-14 RX ORDER — ACETAMINOPHEN 500 MG/5ML
1000 LIQUID (ML) ORAL ONCE
Refills: 0 | Status: COMPLETED | OUTPATIENT
Start: 2025-04-14 | End: 2025-04-14

## 2025-04-14 RX ORDER — SODIUM CHLORIDE 9 G/1000ML
2000 INJECTION, SOLUTION INTRAVENOUS ONCE
Refills: 0 | Status: COMPLETED | OUTPATIENT
Start: 2025-04-14 | End: 2025-04-14

## 2025-04-14 RX ORDER — BISMUTH SUBSALICYLATE 262 MG/1
30 TABLET, CHEWABLE ORAL ONCE
Refills: 0 | Status: COMPLETED | OUTPATIENT
Start: 2025-04-14 | End: 2025-04-14

## 2025-04-14 RX ORDER — ONDANSETRON HCL/PF 4 MG/2 ML
4 VIAL (ML) INJECTION ONCE
Refills: 0 | Status: COMPLETED | OUTPATIENT
Start: 2025-04-14 | End: 2025-04-14

## 2025-04-14 RX ADMIN — Medication 400 MILLIGRAM(S): at 16:33

## 2025-04-14 RX ADMIN — Medication 4 MILLIGRAM(S): at 15:34

## 2025-04-14 RX ADMIN — SODIUM CHLORIDE 2000 MILLILITER(S): 9 INJECTION, SOLUTION INTRAVENOUS at 16:33

## 2025-04-14 RX ADMIN — Medication 1000 MILLIGRAM(S): at 17:20

## 2025-04-14 RX ADMIN — BISMUTH SUBSALICYLATE 30 MILLILITER(S): 262 TABLET, CHEWABLE ORAL at 23:24

## 2025-04-14 NOTE — ED ADULT NURSE NOTE - NSFALLRISKINTERV_ED_ALL_ED
Assistance OOB with selected safe patient handling equipment if applicable/Assistance with ambulation/Communicate fall risk and risk factors to all staff, patient, and family/Provide patient with walking aids/Provide visual cue: yellow wristband, yellow gown, etc/Reinforce activity limits and safety measures with patient and family/Call bell, personal items and telephone in reach/Instruct patient to call for assistance before getting out of bed/chair/stretcher/Non-slip footwear applied when patient is off stretcher/[place] to call system/Physically safe environment - no spills, clutter or unnecessary equipment/Purposeful Proactive Rounding/Room/bathroom lighting operational, light cord in reach

## 2025-04-14 NOTE — ED ADULT NURSE NOTE - OBJECTIVE STATEMENT
Received report from WHIT Martinez. Identified pt and updated on plan of care. Chief complaint is weakness.

## 2025-04-14 NOTE — ED PROVIDER NOTE - OBJECTIVE STATEMENT
The patient was brought in by her aide because she has been having diarrhea since 9p yesterday and vomiting for 2h prior to arrival. She denies having any pain and there are no known sick contacts. The patient denies pain but she is having chills. The patient lives alone at home with a 24h aide, who states the patients daughter is aware that the patient is here.

## 2025-04-14 NOTE — ED PROVIDER NOTE - NSICDXPASTMEDICALHX_GEN_ALL_CORE_FT
PAST MEDICAL HISTORY:  Glaucoma     HLD (hyperlipidemia)     HTN (hypertension)     Legal blindness     Seizure

## 2025-04-14 NOTE — ED PROVIDER NOTE - CLINICAL SUMMARY MEDICAL DECISION MAKING FREE TEXT BOX
89F p/w n/v/d  -high clinical concern for viral syndrome, could also be other infection  -ecg, monitor, pulse ox  -ed sepsis labs  -blood cx x 2, ua/culture, flu/covid/rsv swab  -ivf  -cxr, ct a/p

## 2025-04-14 NOTE — ED PROVIDER NOTE - CARE PROVIDER_API CALL
Tong Calero)  Internal Medicine  [address] [place] [address]  Phone: [phone]  Fax: [phone]  Established Patient  Follow Up Time: 4-6 Days

## 2025-04-14 NOTE — ED PROVIDER NOTE - PROGRESS NOTE DETAILS
pt has no pain, tolerating oral intake still having occasional diarrhea. likely viral syndrome. risk of delirium from admission given records of this happening during prior admission outweighs benefit of admission as there is no objective reason to admit given imaging and labs showing normalizing lactate so will maria luisa Velazquez MD

## 2025-04-14 NOTE — ED PROVIDER NOTE - PATIENT PORTAL LINK FT
You can access the FollowMyHealth Patient Portal offered by [facility] by registering at the following website: [email or web address]. By joining [facility]’s FollowMyHealth portal, you will also be able to view your health information using other applications (apps) compatible with our system.

## 2025-04-15 VITALS
OXYGEN SATURATION: 99 % | HEART RATE: 89 BPM | DIASTOLIC BLOOD PRESSURE: 92 MMHG | TEMPERATURE: 98 F | SYSTOLIC BLOOD PRESSURE: 168 MMHG | RESPIRATION RATE: 18 BRPM

## 2025-04-17 LAB — LEVETIRACETAM SERPL-MCNC: <2 UG/ML — LOW (ref 10–40)

## 2025-04-18 RX ORDER — CEFUROXIME SODIUM 1.5 G
1 VIAL (EA) INJECTION
Qty: 14 | Refills: 0
Start: 2025-04-18 | End: 2025-04-24

## 2025-06-29 NOTE — PATIENT PROFILE ADULT - STATED REASON FOR ADMISSION
PRINCIPAL DISCHARGE DIAGNOSIS  Diagnosis: Acute UTI  Assessment and Plan of Treatment: completed course antibiotics      SECONDARY DISCHARGE DIAGNOSES  Diagnosis: Urothelial lesion  Assessment and Plan of Treatment: follow up with urology     confusion

## 2025-07-23 ENCOUNTER — EMERGENCY (EMERGENCY)
Facility: HOSPITAL | Age: 89
LOS: 0 days | Discharge: HOME | End: 2025-07-23
Attending: STUDENT IN AN ORGANIZED HEALTH CARE EDUCATION/TRAINING PROGRAM
Payer: MEDICARE

## 2025-07-23 VITALS
HEIGHT: 63 IN | DIASTOLIC BLOOD PRESSURE: 78 MMHG | WEIGHT: 139.99 LBS | OXYGEN SATURATION: 98 % | RESPIRATION RATE: 20 BRPM | TEMPERATURE: 99 F | SYSTOLIC BLOOD PRESSURE: 147 MMHG | HEART RATE: 87 BPM

## 2025-07-23 VITALS
OXYGEN SATURATION: 97 % | RESPIRATION RATE: 16 BRPM | SYSTOLIC BLOOD PRESSURE: 198 MMHG | TEMPERATURE: 98 F | HEART RATE: 89 BPM | DIASTOLIC BLOOD PRESSURE: 109 MMHG

## 2025-07-23 DIAGNOSIS — R11.2 NAUSEA WITH VOMITING, UNSPECIFIED: ICD-10-CM

## 2025-07-23 DIAGNOSIS — Z88.0 ALLERGY STATUS TO PENICILLIN: ICD-10-CM

## 2025-07-23 DIAGNOSIS — Z86.69 PERSONAL HISTORY OF OTHER DISEASES OF THE NERVOUS SYSTEM AND SENSE ORGANS: ICD-10-CM

## 2025-07-23 DIAGNOSIS — E78.5 HYPERLIPIDEMIA, UNSPECIFIED: ICD-10-CM

## 2025-07-23 DIAGNOSIS — I10 ESSENTIAL (PRIMARY) HYPERTENSION: ICD-10-CM

## 2025-07-23 LAB
ALBUMIN SERPL ELPH-MCNC: 2.6 G/DL — LOW (ref 3.3–5)
ALP SERPL-CCNC: 78 U/L — SIGNIFICANT CHANGE UP (ref 40–120)
ALT FLD-CCNC: 14 U/L — SIGNIFICANT CHANGE UP (ref 12–78)
ANION GAP SERPL CALC-SCNC: 7 MMOL/L — SIGNIFICANT CHANGE UP (ref 5–17)
APPEARANCE UR: CLEAR — SIGNIFICANT CHANGE UP
AST SERPL-CCNC: 19 U/L — SIGNIFICANT CHANGE UP (ref 15–37)
BACTERIA # UR AUTO: ABNORMAL /HPF
BASOPHILS # BLD AUTO: 0 K/UL — SIGNIFICANT CHANGE UP (ref 0–0.2)
BASOPHILS NFR BLD AUTO: 0 % — SIGNIFICANT CHANGE UP (ref 0–2)
BILIRUB SERPL-MCNC: 0.3 MG/DL — SIGNIFICANT CHANGE UP (ref 0.2–1.2)
BILIRUB UR-MCNC: NEGATIVE — SIGNIFICANT CHANGE UP
BUN SERPL-MCNC: 21 MG/DL — SIGNIFICANT CHANGE UP (ref 7–23)
CALCIUM SERPL-MCNC: 8.9 MG/DL — SIGNIFICANT CHANGE UP (ref 8.5–10.1)
CHLORIDE SERPL-SCNC: 107 MMOL/L — SIGNIFICANT CHANGE UP (ref 96–108)
CO2 SERPL-SCNC: 24 MMOL/L — SIGNIFICANT CHANGE UP (ref 22–31)
COLOR SPEC: YELLOW — SIGNIFICANT CHANGE UP
CREAT SERPL-MCNC: 1.26 MG/DL — SIGNIFICANT CHANGE UP (ref 0.5–1.3)
DIFF PNL FLD: NEGATIVE — SIGNIFICANT CHANGE UP
EGFR: 40 ML/MIN/1.73M2 — LOW
EGFR: 40 ML/MIN/1.73M2 — LOW
EOSINOPHIL # BLD AUTO: 0.18 K/UL — SIGNIFICANT CHANGE UP (ref 0–0.5)
EOSINOPHIL NFR BLD AUTO: 3 % — SIGNIFICANT CHANGE UP (ref 0–6)
EPI CELLS # UR: PRESENT
GLUCOSE SERPL-MCNC: 138 MG/DL — HIGH (ref 70–99)
GLUCOSE UR QL: NEGATIVE MG/DL — SIGNIFICANT CHANGE UP
HCT VFR BLD CALC: 30.7 % — LOW (ref 34.5–45)
HGB BLD-MCNC: 9.9 G/DL — LOW (ref 11.5–15.5)
KETONES UR QL: NEGATIVE MG/DL — SIGNIFICANT CHANGE UP
LEUKOCYTE ESTERASE UR-ACNC: ABNORMAL
LIDOCAIN IGE QN: 18 U/L — SIGNIFICANT CHANGE UP (ref 13–75)
LYMPHOCYTES # BLD AUTO: 2.44 K/UL — SIGNIFICANT CHANGE UP (ref 1–3.3)
LYMPHOCYTES # BLD AUTO: 41 % — SIGNIFICANT CHANGE UP (ref 13–44)
MANUAL SMEAR VERIFICATION: SIGNIFICANT CHANGE UP
MCHC RBC-ENTMCNC: 31.1 PG — SIGNIFICANT CHANGE UP (ref 27–34)
MCHC RBC-ENTMCNC: 32.2 G/DL — SIGNIFICANT CHANGE UP (ref 32–36)
MCV RBC AUTO: 96.5 FL — SIGNIFICANT CHANGE UP (ref 80–100)
MONOCYTES # BLD AUTO: 0.72 K/UL — SIGNIFICANT CHANGE UP (ref 0–0.9)
MONOCYTES NFR BLD AUTO: 12 % — SIGNIFICANT CHANGE UP (ref 2–14)
NEUTROPHILS # BLD AUTO: 2.62 K/UL — SIGNIFICANT CHANGE UP (ref 1.8–7.4)
NEUTROPHILS NFR BLD AUTO: 43 % — SIGNIFICANT CHANGE UP (ref 43–77)
NEUTS BAND # BLD: 1 % — SIGNIFICANT CHANGE UP (ref 0–8)
NEUTS BAND NFR BLD: 1 % — SIGNIFICANT CHANGE UP (ref 0–8)
NITRITE UR-MCNC: NEGATIVE — SIGNIFICANT CHANGE UP
NRBC # BLD: 0 /100 WBCS — SIGNIFICANT CHANGE UP (ref 0–0)
NRBC BLD AUTO-RTO: SIGNIFICANT CHANGE UP /100 WBCS (ref 0–0)
NRBC BLD-RTO: 0 /100 WBCS — SIGNIFICANT CHANGE UP (ref 0–0)
PH UR: 7.5 — SIGNIFICANT CHANGE UP (ref 5–8)
PLAT MORPH BLD: NORMAL — SIGNIFICANT CHANGE UP
PLATELET # BLD AUTO: 154 K/UL — SIGNIFICANT CHANGE UP (ref 150–400)
POTASSIUM SERPL-MCNC: 4.3 MMOL/L — SIGNIFICANT CHANGE UP (ref 3.5–5.3)
POTASSIUM SERPL-SCNC: 4.3 MMOL/L — SIGNIFICANT CHANGE UP (ref 3.5–5.3)
PROT SERPL-MCNC: 7.4 GM/DL — SIGNIFICANT CHANGE UP (ref 6–8.3)
PROT UR-MCNC: NEGATIVE MG/DL — SIGNIFICANT CHANGE UP
RBC # BLD: 3.18 M/UL — LOW (ref 3.8–5.2)
RBC # FLD: 14.9 % — HIGH (ref 10.3–14.5)
RBC BLD AUTO: NORMAL — SIGNIFICANT CHANGE UP
RBC CASTS # UR COMP ASSIST: 4 /HPF — SIGNIFICANT CHANGE UP (ref 0–4)
SODIUM SERPL-SCNC: 138 MMOL/L — SIGNIFICANT CHANGE UP (ref 135–145)
SP GR SPEC: 1.01 — SIGNIFICANT CHANGE UP (ref 1–1.03)
TROPONIN I, HIGH SENSITIVITY RESULT: 5.8 NG/L — SIGNIFICANT CHANGE UP
UROBILINOGEN FLD QL: 0.2 MG/DL — SIGNIFICANT CHANGE UP (ref 0.2–1)
WBC # BLD: 5.96 K/UL — SIGNIFICANT CHANGE UP (ref 3.8–10.5)
WBC # FLD AUTO: 5.96 K/UL — SIGNIFICANT CHANGE UP (ref 3.8–10.5)
WBC UR QL: 4 /HPF — SIGNIFICANT CHANGE UP (ref 0–5)

## 2025-07-23 PROCEDURE — 93010 ELECTROCARDIOGRAM REPORT: CPT

## 2025-07-23 PROCEDURE — 70450 CT HEAD/BRAIN W/O DYE: CPT | Mod: 26

## 2025-07-23 PROCEDURE — 99285 EMERGENCY DEPT VISIT HI MDM: CPT

## 2025-07-23 RX ORDER — ONDANSETRON HCL/PF 4 MG/2 ML
4 VIAL (ML) INJECTION ONCE
Refills: 0 | Status: COMPLETED | OUTPATIENT
Start: 2025-07-23 | End: 2025-07-23

## 2025-07-23 RX ORDER — AMLODIPINE BESYLATE 10 MG/1
10 TABLET ORAL ONCE
Refills: 0 | Status: COMPLETED | OUTPATIENT
Start: 2025-07-23 | End: 2025-07-23

## 2025-07-23 RX ADMIN — Medication 20 MILLIGRAM(S): at 14:23

## 2025-07-23 RX ADMIN — Medication 2000 MILLILITER(S): at 14:23

## 2025-07-23 RX ADMIN — AMLODIPINE BESYLATE 10 MILLIGRAM(S): 10 TABLET ORAL at 20:05

## 2025-07-23 RX ADMIN — Medication 4 MILLIGRAM(S): at 14:49

## 2025-07-23 NOTE — ED PROVIDER NOTE - NSFOLLOWUPINSTRUCTIONS_ED_ALL_ED_FT
You were seen in the Emergency Department for nausea, vomiting, near syncopal episode.     1) Advance activity as tolerated.   2) Continue all previously prescribed medications as directed.    3) Follow up with cardiology and your primary care physician in 2-3 days - take copies of your results.    4) Return to the Emergency Department for worsening or persistent symptoms, and/or ANY NEW OR CONCERNING SYMPTOMS.     SEEK IMMEDIATE MEDICAL CARE IF YOU HAVE ANY OF THE FOLLOWING SYMPTOMS: severe headache, pain in your chest/abdomen/back, bleeding from your mouth or rectum, palpitations, shortness of breath, pain with breathing, seizure, confusion, or trouble walking.

## 2025-07-23 NOTE — ED ADULT NURSE NOTE - NSFALLHARMRISKINTERV_ED_ALL_ED
Assistance OOB with selected safe patient handling equipment if applicable/Assistance with ambulation/Communicate risk of Fall with Harm to all staff, patient, and family/Monitor gait and stability/Monitor for mental status changes and reorient to person, place, and time, as needed/Provide visual cue: red socks, yellow wristband, yellow gown, etc/Reinforce activity limits and safety measures with patient and family/Toileting schedule using arm’s reach rule for commode and bathroom/Use of alarms - bed, stretcher, chair and/or video monitoring/Bed in lowest position, wheels locked, appropriate side rails in place/Call bell, personal items and telephone in reach/Instruct patient to call for assistance before getting out of bed/chair/stretcher/Non-slip footwear applied when patient is off stretcher/[place] to call system/Physically safe environment - no spills, clutter or unnecessary equipment/Purposeful Proactive Rounding/Room/bathroom lighting operational, light cord in reach

## 2025-07-23 NOTE — PHARMACOTHERAPY INTERVENTION NOTE - COMMENTS
Code Stroke initially called for patient complaining of increased AMS  and weakness (no LOC) per aide at bedside ~1130 today 7/23.  [id number] used for aide at bedside. Pharmacy at bedside with ED team to assess patient. No neuro deficits noted upon assessment and code stroke cancelled by provider. Of note, patient has pmhx of dementia.    Tenecteplase not indicated as code stroke cancelled by provider.

## 2025-07-23 NOTE — ED ADULT NURSE REASSESSMENT NOTE - NS ED NURSE REASSESS COMMENT FT1
Received report from Tiffanie PARRA, patient A&Ox2, being set up for ambulance ride home, 18 g Iv in L hand patent and site WNL, patient hypertensive, Dr Kincaid aware. Aid at the bedside.

## 2025-07-23 NOTE — ED ADULT NURSE NOTE - OBJECTIVE STATEMENT
Pt BIBEMS from home s/p syncope at approx. 1130a and AMS as per family. Pt VSS, awake and alert, disoriented, placed on cardiac monitor. H/O Dementia and blindness.  upon arrival.  Code stroke called at triage and canceled at triage by Dr Almanzar.

## 2025-07-23 NOTE — ED PROVIDER NOTE - PROGRESS NOTE DETAILS
Patient signed out by Dr. Tonie Torres, patient is similar to their baseline, patient awaiting repeat trop. If normal DC w/ follow up w/ cardiology. Improvement on symptoms. Spoke to patient/family about results including incidental findings. Patient passed p.o. challenge. Spoke to patient/family about lack of emergent pathology requiring admission at this time. Plan to discharge patient. Patient/family given cardiology follow up and return precautions. Patient/family agrees with plan.

## 2025-07-23 NOTE — ED PROVIDER NOTE - NSFOLLOWUPCLINICSTOKEN_GEN_ALL_ED_FT
[id number]: || ||00\01||False;[id number]: || ||00\01||False;[id number]: || ||00\01||False;[id number]: || ||00\01||False;[id number]: || ||00\01||False;

## 2025-07-23 NOTE — ED PROVIDER NOTE - CLINICAL SUMMARY MEDICAL DECISION MAKING FREE TEXT BOX
89-year-old female with past medical history of hypertension, hyperlipidemia, focal seizure, dementia alert and oriented x 1-2 at baseline reported by EMS with home health aide with patient reported to the ED complaining of discomfort nausea without reported vomiting episodes reported that patient may have had near syncopal episode, patient is otherwise at baseline, she is neurologically intact, denies any localized abdominal pain denies any chest pain or shortness of breath, spoke with home health aide at bedside with  ID [id number], patient follows commands, no facial droop no slurred speech no focal weakness noted.  NIH stroke scale 0.  Will obtain EKG cardiac enzymes, urinalysis CT head given reported near syncopal episode.  P.o. challenge and reevaluate.  EKG normal sinus rhythm minimal LVH criteria, isolated T wave inversion in aVL.   Pending further workup prior to disposition

## 2025-07-23 NOTE — ED ADULT NURSE NOTE - CHIEF COMPLAINT QUOTE
Syncope at 1130a and altered  increase confusion as per family H/O Dementia Blind   Code stroke called at triage and canceled at triage by Dr Almanzar

## 2025-07-23 NOTE — ED PROVIDER NOTE - PATIENT PORTAL LINK FT
You can access the FollowMyHealth Patient Portal offered by [facility] by registering at the following website: [email or web address]. By joining [facility]’s FollowMyHealth portal, you will also be able to view your health information using other applications (apps) compatible with our system.

## 2025-07-23 NOTE — ED ADULT TRIAGE NOTE - CHIEF COMPLAINT QUOTE
Syncope at 1130a and altered  increase confusion as per family H/O Dementia Blind   Syncope at 1130a and altered  increase confusion as per family H/O Dementia Blind   Code stroke called at triage and canceled at triage by Dr Almanzar

## 2025-07-23 NOTE — ED PROVIDER NOTE - NSFOLLOWUPCLINICS_GEN_ALL_ED_FT
[facility] at [facility]  Cardiology  [address] [place] [address]  Phone: [phone]  Fax:     Cardiology at [facility]  Cardiology  [address] [place] [address], [address] [place] [address]  Phone: [phone]  Fax:     Cardiology at [facility]  Cardiology  [address] [place] [address]  Phone: [phone]  Fax:     [facility]  Cardiology  [address] [facility], [address]  [place] [address]  Phone: [phone]  Fax:     [facility]  Cardiology  [address] [place] [address]  Phone: [phone]  Fax: